# Patient Record
Sex: MALE | Race: WHITE | NOT HISPANIC OR LATINO | Employment: FULL TIME | URBAN - METROPOLITAN AREA
[De-identification: names, ages, dates, MRNs, and addresses within clinical notes are randomized per-mention and may not be internally consistent; named-entity substitution may affect disease eponyms.]

---

## 2017-02-09 RX ORDER — SIMVASTATIN 40 MG/1
40 TABLET, FILM COATED ORAL DAILY
Qty: 30 TABLET | Refills: 11 | Status: ON HOLD | OUTPATIENT
Start: 2017-02-09 | End: 2017-10-11 | Stop reason: HOSPADM

## 2017-09-13 ENCOUNTER — PATIENT MESSAGE (OUTPATIENT)
Dept: INTERNAL MEDICINE | Facility: CLINIC | Age: 47
End: 2017-09-13

## 2017-09-13 ENCOUNTER — HOSPITAL ENCOUNTER (EMERGENCY)
Facility: HOSPITAL | Age: 47
Discharge: LEFT BEFORE DISCHARGE | End: 2017-09-13
Payer: COMMERCIAL

## 2017-09-13 VITALS
RESPIRATION RATE: 18 BRPM | TEMPERATURE: 99 F | SYSTOLIC BLOOD PRESSURE: 155 MMHG | HEIGHT: 69 IN | WEIGHT: 180 LBS | BODY MASS INDEX: 26.66 KG/M2 | HEART RATE: 81 BPM | DIASTOLIC BLOOD PRESSURE: 91 MMHG | OXYGEN SATURATION: 98 %

## 2017-09-13 DIAGNOSIS — R06.02 SHORTNESS OF BREATH: ICD-10-CM

## 2017-09-13 DIAGNOSIS — Z53.21 PATIENT LEFT WITHOUT BEING SEEN: ICD-10-CM

## 2017-09-13 PROCEDURE — 99900041 HC LEFT WITHOUT BEING SEEN- EMERGENCY

## 2017-09-13 PROCEDURE — 99900 PR LEFT WITHOUTBEING SEEN-EMERGENCY: CPT | Mod: ,,, | Performed by: EMERGENCY MEDICINE

## 2017-09-13 PROCEDURE — 93005 ELECTROCARDIOGRAM TRACING: CPT

## 2017-09-13 PROCEDURE — 93010 ELECTROCARDIOGRAM REPORT: CPT | Mod: ,,, | Performed by: INTERNAL MEDICINE

## 2017-09-13 NOTE — PROVIDER PROGRESS NOTES - EMERGENCY DEPT.
Encounter Date: 9/13/2017    ED Physician Progress Notes       SCRIBE NOTE: I, Gris Alston, am scribing for, and in the presence of,  Dr. Thomas.  Physician Statement: I, Dr. Thomas, personally performed the services described in this documentation as scribed by Gris Alston in my presence, and it is both accurate and complete.      EKG - STEMI Decision  Initial Reading: No STEMI present.

## 2017-09-18 ENCOUNTER — OFFICE VISIT (OUTPATIENT)
Dept: INTERNAL MEDICINE | Facility: CLINIC | Age: 47
End: 2017-09-18
Payer: COMMERCIAL

## 2017-09-18 VITALS
DIASTOLIC BLOOD PRESSURE: 70 MMHG | HEART RATE: 81 BPM | HEIGHT: 69 IN | SYSTOLIC BLOOD PRESSURE: 122 MMHG | OXYGEN SATURATION: 98 % | WEIGHT: 186.75 LBS | BODY MASS INDEX: 27.66 KG/M2

## 2017-09-18 DIAGNOSIS — R06.00 DYSPNEA, UNSPECIFIED TYPE: ICD-10-CM

## 2017-09-18 DIAGNOSIS — R42 VERTIGO: ICD-10-CM

## 2017-09-18 DIAGNOSIS — R07.9 CHEST PAIN, UNSPECIFIED TYPE: Primary | ICD-10-CM

## 2017-09-18 DIAGNOSIS — Z00.00 ANNUAL PHYSICAL EXAM: ICD-10-CM

## 2017-09-18 PROCEDURE — 99999 PR PBB SHADOW E&M-EST. PATIENT-LVL III: CPT | Mod: PBBFAC,,, | Performed by: INTERNAL MEDICINE

## 2017-09-18 PROCEDURE — 3008F BODY MASS INDEX DOCD: CPT | Mod: S$GLB,,, | Performed by: INTERNAL MEDICINE

## 2017-09-18 PROCEDURE — 99214 OFFICE O/P EST MOD 30 MIN: CPT | Mod: S$GLB,,, | Performed by: INTERNAL MEDICINE

## 2017-09-18 NOTE — PROGRESS NOTES
Subjective:       Patient ID: Fausto Patterson is a 47 y.o. male.    Chief Complaint: cp, sob  Shortness of Breath   This is a recurrent problem. The current episode started more than 1 month ago. The problem occurs intermittently. The problem has been unchanged. The average episode lasts 2 minutes. Associated symptoms include chest pain, coryza, rhinorrhea and sputum production. Pertinent negatives include no abdominal pain or fever. The symptoms are aggravated by exercise. The patient has no known risk factors for DVT/PE. He has tried nothing for the symptoms. His past medical history is significant for allergies.      Was working out - high intensity P90 X.  Bands, jumping, push ups, etc.    Had chest pain at highest intensity assoc with high HR.   He  Has had similar discomfort in past.  Typically has cp when waling on treadmill for 5-10 min, then better.   m ild calcific atherosclerosis by renal ct 2016.  He has return to ett, but not high intensity exercises.  He is taking zocor.       Awoke with dizziness (vertigo) sob.  Took and ASA.  bp 149/100.      Went to ED.  BP down to 140/99.  He felt better so went home before evaluated by MD.      He was told ekg was nl.  Vertigo has resolved.    Feels foggy mentally.    Home BP fine since.  Reviewed.              Review of Systems   Constitutional: Negative for fatigue, fever and unexpected weight change.   HENT: Positive for rhinorrhea.    Respiratory: Positive for sputum production and shortness of breath.    Cardiovascular: Positive for chest pain.   Gastrointestinal: Positive for nausea (with vertigo). Negative for abdominal pain, constipation and diarrhea.       Objective:      Physical Exam   Constitutional: He is oriented to person, place, and time. He appears well-developed and well-nourished.   Eyes: No scleral icterus.   Neck: No JVD present. No thyromegaly present.   Cardiovascular: Normal rate, regular rhythm and normal heart sounds.    Pulmonary/Chest:  Effort normal and breath sounds normal. No respiratory distress. He has no wheezes. He has no rales.   Abdominal: Soft. He exhibits no mass. There is no tenderness.   Musculoskeletal: He exhibits no edema.   Neurological: He is alert and oriented to person, place, and time.   Psychiatric: He has a normal mood and affect. His behavior is normal.       Assessment:       1. Chest pain, unspecified type    2. Dyspnea, unspecified type    3. Vertigo    4. Annual physical exam        Plan:       Diagnoses and all orders for this visit:    Chest pain, unspecified type  -     Cardiac treadmill stress test; Future    Dyspnea, unspecified type    Vertigo    Annual physical exam  -     CBC auto differential; Future  -     Comprehensive metabolic panel; Future  -     Lipid panel; Future

## 2017-10-02 ENCOUNTER — HOSPITAL ENCOUNTER (OUTPATIENT)
Dept: CARDIOLOGY | Facility: CLINIC | Age: 47
Discharge: HOME | DRG: 234 | End: 2017-10-02
Payer: COMMERCIAL

## 2017-10-02 ENCOUNTER — HOSPITAL ENCOUNTER (INPATIENT)
Facility: HOSPITAL | Age: 47
LOS: 9 days | Discharge: HOME | DRG: 234 | End: 2017-10-11
Attending: HOSPITALIST | Admitting: HOSPITALIST
Payer: COMMERCIAL

## 2017-10-02 DIAGNOSIS — I20.9 ACUTE ANGINA: ICD-10-CM

## 2017-10-02 DIAGNOSIS — R94.39 ABNORMAL STRESS ECG WITH TREADMILL: ICD-10-CM

## 2017-10-02 DIAGNOSIS — Z98.890 HISTORY OF OPEN HEART SURGERY: ICD-10-CM

## 2017-10-02 DIAGNOSIS — E78.5 HLD (HYPERLIPIDEMIA): ICD-10-CM

## 2017-10-02 DIAGNOSIS — E78.2 MIXED HYPERLIPIDEMIA: Primary | ICD-10-CM

## 2017-10-02 DIAGNOSIS — I25.10 CAD (CORONARY ARTERY DISEASE), NATIVE CORONARY ARTERY: ICD-10-CM

## 2017-10-02 DIAGNOSIS — I25.118 CORONARY ARTERY DISEASE OF NATIVE ARTERY OF NATIVE HEART WITH STABLE ANGINA PECTORIS: ICD-10-CM

## 2017-10-02 DIAGNOSIS — R07.9 CHEST PAIN: ICD-10-CM

## 2017-10-02 DIAGNOSIS — I49.9 ARRHYTHMIA: ICD-10-CM

## 2017-10-02 DIAGNOSIS — Z01.818 PRE-OP EVALUATION: ICD-10-CM

## 2017-10-02 DIAGNOSIS — Z95.1 S/P CABG X 3: ICD-10-CM

## 2017-10-02 DIAGNOSIS — R07.9 CHEST PAIN: Primary | ICD-10-CM

## 2017-10-02 DIAGNOSIS — R73.9 STRESS HYPERGLYCEMIA: ICD-10-CM

## 2017-10-02 DIAGNOSIS — I25.10 CORONARY ARTERIOSCLEROSIS: ICD-10-CM

## 2017-10-02 DIAGNOSIS — I21.4 NSTEMI (NON-ST ELEVATED MYOCARDIAL INFARCTION): ICD-10-CM

## 2017-10-02 DIAGNOSIS — K22.2 ESOPHAGEAL OBSTRUCTION: ICD-10-CM

## 2017-10-02 DIAGNOSIS — R07.9 CHEST PAIN, UNSPECIFIED TYPE: ICD-10-CM

## 2017-10-02 DIAGNOSIS — I48.91 A-FIB: ICD-10-CM

## 2017-10-02 LAB
ABO GROUP BLD: NORMAL
BLD GP AB SCN CELLS X3 SERPL QL: NORMAL
DIASTOLIC DYSFUNCTION: NO
DIASTOLIC DYSFUNCTION: NO
ESTIMATED PA SYSTOLIC PRESSURE: 25.66
MITRAL VALVE REGURGITATION: NORMAL
RETIRED EF AND QEF - SEE NOTES: 65 (ref 55–65)
RH BLD: NORMAL
TRICUSPID VALVE REGURGITATION: NORMAL

## 2017-10-02 PROCEDURE — 25000003 PHARM REV CODE 250

## 2017-10-02 PROCEDURE — 99220 PR INITIAL OBSERVATION CARE,LEVL III: CPT | Mod: ,,, | Performed by: HOSPITALIST

## 2017-10-02 PROCEDURE — B2111ZZ FLUOROSCOPY OF MULTIPLE CORONARY ARTERIES USING LOW OSMOLAR CONTRAST: ICD-10-PCS | Performed by: INTERNAL MEDICINE

## 2017-10-02 PROCEDURE — 93306 TTE W/DOPPLER COMPLETE: CPT | Mod: S$GLB,,, | Performed by: INTERNAL MEDICINE

## 2017-10-02 PROCEDURE — C1760 CLOSURE DEV, VASC: HCPCS

## 2017-10-02 PROCEDURE — 25000003 PHARM REV CODE 250: Performed by: INTERNAL MEDICINE

## 2017-10-02 PROCEDURE — 93015 CV STRESS TEST SUPVJ I&R: CPT | Mod: S$GLB,,, | Performed by: INTERNAL MEDICINE

## 2017-10-02 PROCEDURE — 63600175 PHARM REV CODE 636 W HCPCS

## 2017-10-02 PROCEDURE — 86850 RBC ANTIBODY SCREEN: CPT

## 2017-10-02 PROCEDURE — 93458 L HRT ARTERY/VENTRICLE ANGIO: CPT | Mod: 26,,, | Performed by: INTERNAL MEDICINE

## 2017-10-02 PROCEDURE — 99152 MOD SED SAME PHYS/QHP 5/>YRS: CPT | Mod: ,,, | Performed by: INTERNAL MEDICINE

## 2017-10-02 PROCEDURE — G0378 HOSPITAL OBSERVATION PER HR: HCPCS

## 2017-10-02 PROCEDURE — 86900 BLOOD TYPING SEROLOGIC ABO: CPT

## 2017-10-02 PROCEDURE — 86901 BLOOD TYPING SEROLOGIC RH(D): CPT

## 2017-10-02 PROCEDURE — 25000003 PHARM REV CODE 250: Performed by: HOSPITALIST

## 2017-10-02 PROCEDURE — 4A023N7 MEASUREMENT OF CARDIAC SAMPLING AND PRESSURE, LEFT HEART, PERCUTANEOUS APPROACH: ICD-10-PCS | Performed by: INTERNAL MEDICINE

## 2017-10-02 PROCEDURE — 99152 MOD SED SAME PHYS/QHP 5/>YRS: CPT

## 2017-10-02 RX ORDER — SODIUM CHLORIDE 9 MG/ML
3 INJECTION, SOLUTION INTRAVENOUS CONTINUOUS
Status: DISCONTINUED | OUTPATIENT
Start: 2017-10-02 | End: 2017-10-03

## 2017-10-02 RX ORDER — METOPROLOL SUCCINATE 25 MG/1
50 TABLET, EXTENDED RELEASE ORAL DAILY
Status: DISCONTINUED | OUTPATIENT
Start: 2017-10-02 | End: 2017-10-06

## 2017-10-02 RX ORDER — ATORVASTATIN CALCIUM 20 MG/1
40 TABLET, FILM COATED ORAL DAILY
Status: DISCONTINUED | OUTPATIENT
Start: 2017-10-02 | End: 2017-10-06

## 2017-10-02 RX ORDER — ASPIRIN 81 MG/1
81 TABLET ORAL DAILY
Status: DISCONTINUED | OUTPATIENT
Start: 2017-10-03 | End: 2017-10-06

## 2017-10-02 RX ORDER — DIPHENHYDRAMINE HCL 50 MG
50 CAPSULE ORAL EVERY 6 HOURS
Status: DISCONTINUED | OUTPATIENT
Start: 2017-10-02 | End: 2017-10-02 | Stop reason: HOSPADM

## 2017-10-02 RX ADMIN — SODIUM CHLORIDE 3 ML/KG/HR: 0.9 INJECTION, SOLUTION INTRAVENOUS at 04:10

## 2017-10-02 RX ADMIN — SODIUM CHLORIDE 3 ML/KG/HR: 0.9 INJECTION, SOLUTION INTRAVENOUS at 11:10

## 2017-10-02 RX ADMIN — ATORVASTATIN CALCIUM 40 MG: 20 TABLET, FILM COATED ORAL at 05:10

## 2017-10-02 RX ADMIN — METOPROLOL SUCCINATE 50 MG: 25 TABLET, EXTENDED RELEASE ORAL at 06:10

## 2017-10-02 RX ADMIN — DIPHENHYDRAMINE HYDROCHLORIDE 50 MG: 50 CAPSULE ORAL at 11:10

## 2017-10-02 NOTE — CONSULTS
INTERVENTIONAL CARDIOLOGY CONSULT      Referring Physician:  Jessica Alejo MD  Indication: Positive stress test     HPI:  42 y/o male with PMH of HLD, calcific atherosclerosis by renal CT 2016, Schatzki's ring, snoring, calcium oxalate kidney stones s/p lithotripsy who presented for an exercise stress test ECG today for a chief complaint of 10 minute-long exertional chest pain that he had been experiencing for 2 months with exercise (high intensity P90 X; resolved with rest). SOB is associated with CP. Nine minutes into the stress test, the patient reported chest pain 9/10 in intensity. He was able to get to his target heart rate and his peak BP was 137/94. He had STD inferolaterally in the recovery period that persisted for at least 15 minutes. His chest pain resolved 19 minutes after the test had ended. In addition, his STD resolved.His father had an MI at the age of 55 years. He took ASA over a week ago.     ECHO 10/2/17  CONCLUSIONS     1 - Normal left ventricular systolic function (EF 60-65%).     2 - No wall motion abnormalities.     3 - Normal left ventricular diastolic function.     4 - Normal right ventricular systolic function .     5 - Mild mitral regurgitation.     6 - Mild tricuspid regurgitation.     7 - The estimated PA systolic pressure is 26 mmHg.     ROS:  Constitution: Negative for fever, chills, weight loss or gain.   HENT: Negative for sore throat, rhinorrhea, or headache.  Eyes: Negative for blurred or double vision.   Cardiovascular: See above  Pulmonary: Positive for SOB   Gastrointestinal: Negative for abdominal pain, nausea, vomiting, or diarrhea.   : Negative for dysuria.   Neurological: Negative for focal weakness or sensory changes.    Past Medical History:   Diagnosis Date    Hyperlipidemia     Kidney stone     Schatzki's ring     Unspecified disorder of kidney and ureter     calcium oxalate nephrolithiasis     History reviewed. No pertinent surgical history.  Family History  "  Problem Relation Age of Onset    No Known Problems Mother     Heart disease Father      cad    Hypertension Sister     No Known Problems Daughter     No Known Problems Daughter      Social History   Substance Use Topics    Smoking status: Never Smoker    Smokeless tobacco: Never Used    Alcohol use No     Review of patient's allergies indicates:   Allergen Reactions    Clarithromycin Other (See Comments)     hiccups    Levaquin [levofloxacin] Hives    Naldecon-ex Hives      diphenhydrAMINE  50 mg Oral Q6H     No current facility-administered medications on file prior to encounter.      Current Outpatient Prescriptions on File Prior to Encounter   Medication Sig Dispense Refill    simvastatin (ZOCOR) 40 MG tablet Take 1 tablet (40 mg total) by mouth once daily. 30 tablet 11       Continuous Infusions:   sodium chloride 0.9% 3 mL/kg/hr (10/02/17 1143)       Scheduled Meds:   diphenhydrAMINE  50 mg Oral Q6H     PRN Meds:  OBJECTIVE:     Vitals:    10/02/17 1115 10/02/17 1137   BP: (!) 148/90 138/88   Pulse: 79    Resp: 16    Temp: 97.8 °F (36.6 °C)    Weight: 84.7 kg (186 lb 11.7 oz)    Height: 5' 9" (1.753 m)      Gen: Lying in bed, no acute distress  HEENT: Supple, no JVD  Cvs: Regular rate and rhythm, no murmurs  Resp: Normal work of breathing, clear bilaterally  Abd: Soft, non-tender and not distended  Ext: Warm, no edema  Vascular:   LEFT RIGHT   RADIAL 2+ 2+   BRACHIAL 2+ 2+   FEMORAL 2+ 2+   DP 2+ 2+   TP 2+ 2+   Uri's Test Normal Normal     Labs:       Recent Labs  Lab 10/02/17  0719      K 4.5      CO2 29   BUN 13   CREATININE 1.2      ANIONGAP 7*     No results for input(s): MG, PHOS in the last 168 hours.    Invalid input(s): CA    Recent Labs  Lab 10/02/17  0719   AST 17   ALT 26   ALKPHOS 69   BILITOT 1.4*   ALBUMIN 4.2        Recent Labs  Lab 10/02/17  0719   WBC 5.57   HGB 16.2   HCT 46.3      GRAN 53.6  3.0     No results for input(s): INR in the last 168 " hours.  No results for input(s): TROPONINI, BNP in the last 168 hours.   Micro:   Blood Cultures  No results found for: LABBLOO  Urine Cultures  No results found for: LABURIN  IMAGING:   EKGs:    TTE:  EF   Date Value Ref Range Status   10/02/2017 65 55 - 65        Prior Ischemic Evaluation:    IMPRESSION:      44 y/o male with PMH of HLD, calcific atherosclerosis by renal CT 2016, Schatzki's ring, snoring, calcium oxalate kidney stones s/p lithotripsy who presented for an exercise stress test ECG today for a chief complaint of 10 minute-long exertional chest pain that he had been experiencing for 2 months with exercise (high intensity P90 X; resolved with rest). The patient had CP and inferolateral STD during his stress test. He is currently CP-free.     PROCEDURAL RISK STRATIFICATION:   BENITA Score:  1  ISAC Score:  73  [place] Predicted PCI Mortality Risk:  0.2  [place] Predicted MACE Risk:  1.4  [place] Bleeding Score & Risk:  19 - very low  Contrast Nephropathy Post-PCI/Renal Risk Score: 0    Prior Contrast Allergy:  No  Sedation   Prior Sedation: Yes   History of Obstructive Sleep Apnea/SDB:  Yes  Pertinent Allergies:   Latex: No    ASA: No   Heparin-Induced Thrombocytopenia: No  PLAN:     Stable angina  - positive stress today today  - right radial access  - ALEX candidate  - [facility] today    -The risks, benefits & alternatives of the procedure were explained to the patient.    -The risks of coronary angiography include but are not limited to:  Bleeding, infection, heart rhythm abnormalities, allergic reactions, kidney injury, stroke and death.    -Should stenting be indicated, the patient has agreed to dual anti-platelet therapy for 1-consecutive year with a drug-eluting stent and a minimum of 1-month with the use of a bare metal stent.    -The risks of moderate sedation include hypotension, respiratory depression, arrhythmias, bronchospasm, & death.    -Informed consent was obtained & the patient is agreeable to  proceed with the procedure.  -This patient was discussed with the attending interventional cardiologist who agrees with the above assessment & plan.

## 2017-10-02 NOTE — PROGRESS NOTES
Pt here for treadmill stress today. Pt had st depression in multiple leads and also 9/10 chest pain as he stepped off treadmill. Dr spaulding and dr meraz assessed ekg's. Dr spaulding added a ccfd which was also done. Received vorb for pt to be direct admit to hospital for left heart cath today. Dr meraz explained this to pt and wife ; they verbalize understanding 20 g sl started in left a/c . Dr garcia cath fellow also spoke with pt and wife. Both verbalizes understanding. Pt brought to [facility] via wheelchair. Wife at pt side; to be prepped  for [facility]. Report given to jarad wei In [facility]sc[facility]. Pt alert and oriented, vss , nad.

## 2017-10-02 NOTE — PROGRESS NOTES
"    Post Cath Note  Referring Physician: Jessica Alejo MD  Procedure: [id number] (PTCA) (Right)       Access: Right radial    3V CAD    See full report for further details    Intervention:   None  Closure device: Radial band    Post Cath Exam:   /88 (BP Location: Right arm, Patient Position: Lying)   Pulse 79   Temp 97.8 °F (36.6 °C)   Resp 16   Ht 5' 9" (1.753 m)   Wt 84.7 kg (186 lb 11.7 oz)   BMI 27.58 kg/m²   No unusual pain, hematoma, thrill or bruit at vascular access site.  Distal pulse present without signs of ischemia.    Recommendations:   - Routine post-cath care  - IVF at 250 cc/hr for 4 hrs  - CTS consult   - GDMT for CAD   - discussed with interventional fellow: no P2Y12i at this time    "

## 2017-10-02 NOTE — PROGRESS NOTES
Patient is a 44 yo male with a past medical hx of HLD who presented for an exercise stress test today for a chief complaint of intermittent chest pain that he was experiencing for roughly 1-2 months with strenuous exertion. 9 minutes into the stress test, the patient reported chest pain (the same type of pain that has been occurring) 9/10 in terms of intensity. He was able to get to his target heart rate and his peak BP was 137/94. The patient after the test was terminated had ST segment depression inferolaterally that persisted for at least 15 minutes. His chest pain was fully gone after 18:30 minutes after the test was ended. In addition his ST depressions had gone away at that time.       Patient reported to me that normally he gets cp with exertion (when he does his P90x exercises) and it gets alleviated by rest. He reports that he has a strong family hx of CAD and MI as his father had a MI at the age of 55. In addition his mother has carotid artery disease. His past medical hx is only significant for HLD and denies DM and HTN. He has a previous CT renal stone study that shows mild atherosclerosis in his aorta.    He has normal Cr function and is not anemic. Last LDL was 89.8. Patient ate last night. Last time he took aspirin was over a week ago.    Plan:  - Admit to hospital medicine with interventional cardiology consult for [facility] +/- PCI (if indicated)  - Performing 2D echo  - Will discuss with admitting staff    Discussed with Spencer Flores MD, PGY-4  Cardiology fellow

## 2017-10-02 NOTE — NURSING TRANSFER
Nursing Transfer Note      10/2/2017     Transfer from cath lab to 355    Transfer via stretcher    Transfer with telemetry, NS gtt    Transported by cath lab RN    Medicines sent: n/a    Chart send with patient: yes    Notified: family at bedside. Dr. Alejo notified    Patient reassessed at: 10/2/17 1648    Upon arrival to floor: VS assessed. Call bell in reach. Right radial site assessed. Bed in lowest position. Will continue to monitor.

## 2017-10-02 NOTE — H&P
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 Callahan, for 1-2 months of intermittent bilateral chest pain with moderate to strenuous exercise while doing an intense workout regimen, the P90X program. The chest pain was always alleviated by rest. He had a prior episode of chest pain with exertion and vertigo for which he went to the ED, had an EKG, and was d/c-ed home. Today, on the treadmill test, he reached goal HR and BP and started having chest pain after 9 minutes, with ST depressions in inferolateral leads for 15-18 mins, and chest pain resolved after 18 minutes of rest. No nitro given. 2D Echo with CFD showed a normal heart with LVEF 65, normal diastolic function, mildly elevated PA pressure of 26 mmHg.      Echo fellow, Dr Spencer Buitrago, called Interventional Cardiology for cath and [place] [facility] [place].     He is a never smoker, does not drink EtOH, no illicits.   Fam Hx: F -  of Mi at 55, M - PAD/carotid stents, GF  of MI at age 85    He went emergently to cath lab, found to have multivessel CAD so no PCI done await CTS consult. No complaints on admit. Never had chest pain at rest, only with exertion. No palpitations, syncope, f/c, or other sx.     No results found for: HGBA1C      Review of Systems:  Constitutional: no fever or chills  Eyes: no visual changes  ENT: no nasal congestion or sore throat  Respiratory: no cough or shortness of breath  Cardiovascular: no chest pain or " palpitations  Gastrointestinal: no nausea or vomiting, no abdominal pain or change in bowel habits  Genitourinary: no hematuria or dysuria  Integument/Breast: no rash or pruritis  Hematologic/Lymphatic: no easy bruising or lymphadenopathy  Allergy/Immunology: none  Musculoskeletal: no arthralgias or myalgias  Neurological: no seizures or tremors  Behavioral/Psych: no auditory or visual hallucinations  Endocrine: no heat or cold intolerance    HISTORY:     Past Medical History:   Diagnosis Date    Hyperlipidemia     Kidney stone     Schatzki's ring     Unspecified disorder of kidney and ureter     calcium oxalate nephrolithiasis       History reviewed. No pertinent surgical history.    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    Treadmill and elliptical 6 days a week.           MEDICATIONS & ALLERGIES:     Current Facility-Administered Medications   Medication    diphenhydrAMINE capsule 50 mg       Review of patient's allergies indicates:   Allergen Reactions    Clarithromycin Other (See Comments)     hiccups    Levaquin [levofloxacin] Hives    Naldecon-ex Hives       OBJECTIVE:     Vital Signs:  Temp:  [97.8 °F (36.6 °C)] 97.8 °F (36.6 °C)  Pulse:  [79] 79  Resp:  [16] 16  BP: (138-148)/(88-90) 138/88    Physical Exam:  General: well developed, well nourished, no distress  Eyes: conjunctivae/corneas clear. PERRL..  HENT:  Head:normocephalic, atraumatic. Ears:bilateral TM's and external ear canals normal. Nose: Nares normal. Septum midline. Mucosa normal. No drainage or sinus tenderness., no discharge. Throat: lips, mucosa, and tongue normal; teeth and gums normal and no throat erythema.  Neck: supple, symmetrical, trachea midline, no JVD and thyroid not enlarged, symmetric, no tenderness/mass/nodules  Lungs:  clear to auscultation bilaterally and normal respiratory effort  Cardiovascular: Heart: regular rate and rhythm, S1, S2 normal, no murmur, click, rub or gallop. Chest Wall: no tenderness. Extremities: no cyanosis or edema, or clubbing. Pulses: 2+ and symmetric.  Abdomen/Rectal: Abdomen: soft, non-tender non-distented; bowel sounds normal; no masses,  no organomegaly. Rectal: not examined  Skin: Skin color, texture, turgor normal. No rashes or lesions  Musculoskeletal:normal gait and no clubbing, cyanosis  Lymph Nodes: No cervical or supraclavicular adenopathy  Neurologic: Normal strength and tone. No focal numbness or weakness  Psych/Behavioral:  Alert and oriented, appropriate affect., Speech: appropriate quality, quantity and organization of sentences and Thought content: normal    Laboratory  Recent Results (from the past 24 hour(s))   CBC auto differential    Collection Time: 10/02/17  7:19 AM   Result Value Ref Range    WBC 5.57 3.90 - 12.70 K/uL    RBC 5.41 4.60 - 6.20 M/uL    Hemoglobin 16.2 14.0 - 18.0 g/dL    Hematocrit 46.3 40.0 - 54.0 %    MCV 86 82 - 98 fL    MCH 29.9 27.0 - 31.0 pg    MCHC 35.0 32.0 - 36.0 g/dL    RDW 12.8 11.5 - 14.5 %    Platelets 207 150 - 350 K/uL    MPV 9.0 (L) 9.2 - 12.9 fL    Gran # 3.0 1.8 - 7.7 K/uL    Lymph # 1.7 1.0 - 4.8 K/uL    Mono # 0.6 0.3 - 1.0 K/uL    Eos # 0.2 0.0 - 0.5 K/uL    Baso # 0.02 0.00 - 0.20 K/uL    Gran% 53.6 38.0 - 73.0 %    Lymph% 30.2 18.0 - 48.0 %    Mono% 11.5 4.0 - 15.0 %    Eosinophil% 4.1 0.0 - 8.0 %    Basophil% 0.4 0.0 - 1.9 %    Differential Method Automated     Comprehensive metabolic panel    Collection Time: 10/02/17  7:19 AM   Result Value Ref Range    Sodium 141 136 - 145 mmol/L    Potassium 4.5 3.5 - 5.1 mmol/L    Chloride 105 95 - 110 mmol/L    CO2 29 23 - 29 mmol/L    Glucose 110 70 - 110 mg/dL    BUN, Bld 13 6 - 20 mg/dL    Creatinine 1.2 0.5 - 1.4 mg/dL    Calcium 9.8 8.7 - 10.5 mg/dL    Total Protein 7.6 6.0 - 8.4 g/dL    Albumin 4.2 3.5 - 5.2 g/dL    Total Bilirubin 1.4 (H) 0.1 - 1.0 mg/dL    Alkaline Phosphatase 69 55 - 135 U/L    AST 17 10 - 40 U/L    ALT 26 10 - 44 U/L    Anion Gap 7 (L) 8 - 16 mmol/L    eGFR if African American >60.0 >60 mL/min/1.73 m^2    eGFR if non African American >60.0 >60 mL/min/1.73 m^2   Lipid panel    Collection Time: 10/02/17  7:19 AM   Result Value Ref Range    Cholesterol 160 120 - 199 mg/dL    Triglycerides 141 30 - 150 mg/dL    HDL 42 40 - 75 mg/dL    LDL Cholesterol 89.8 63.0 - 159.0 mg/dL    HDL/Chol Ratio 26.3 20.0 - 50.0 %    Total Cholesterol/HDL Ratio 3.8 2.0 - 5.0    Non-HDL Cholesterol 118 mg/dL   Cardiac treadmill stress test    Collection Time: 10/02/17  8:00 AM   Result Value Ref Range    Diastolic Dysfunction No    2D Echo w/ Color Flow Doppler    Collection Time: 10/02/17  9:04 AM   Result Value Ref Range    EF 65 55 - 65    Mitral Valve Regurgitation MILD     Diastolic Dysfunction No     Est. PA Systolic Pressure 25.66     Tricuspid Valve Regurgitation MILD    Type & Screen    Collection Time: 10/02/17 11:36 AM   Result Value Ref Range    Indirect Pieter NEG    Group & Rh    Collection Time: 10/02/17 11:36 AM   Result Value Ref Range    ABO A     Rh Type NEG        Radiologic Results:  none    Cardiology results:  10/2 Stress Echo  PRE-TEST DATA   EKG: Resting electrocardiogram reveals normal sinus rhythm at a rate of 68 bpm.     TEST DESCRIPTION   The patient exercised for 9.0 minutes on a High Ramp protocol, corresponding to a functional capacity of 15 estimated METS, achieving a peak heart rate of 164 bpm,  which is 95% of the age predicted maximum heart rate. The patient reported 9/10 chest pain   at peak exercise. All symptoms resolved by 7 minutes of recovery. The patient discontinued exercise secondary to chest pain.     At peak exercise, EKG revealed 2mm of ST segment depression in the inferolateral leads.     EKG Conclusions:    1. The EKG portion of this study is positive for ischemia at a moderate workload, and peak heart rate of 164 bpm (95% of predicted).   2. Exercise capacity is above average.   3. Blood pressure response to exercise was normal (Presenting BP: 132/83 Peak BP: 137/94).   4. No significant arrhythmias were present.   5. The patient reported significant chest pain during the protocol which resolved in recovery.   6. The Duke treadmill score was -9 suggesting an intermediate probability for future cardiovascular events.    Left heart cath  Diagnostic:          Patient has a right dominant coronary artery.        - Left Main Coronary Artery:             The distal LM has a 70% stenosis. There is BENITA 3 flow.     - Left Anterior Descending Artery:             The mid LAD has a 80% stenosis. There is BENITA 3 flow.     - Left Circumflex Artery:             The LCX has luminal irregularities. There is BENITA 3 flow.     - Right Coronary Artery:             The proximal RCA has a 50% stenosis. There is BENITA 3 flow.     - Radial:             The radial was not studied.     - Ramus:             The proximal ramus has a 75% stenosis. There is BENITA 3 flow.     - OM2:             The proximal OM2 has a 50% stenosis. There is BENITA 3 flow.    Prior Cardiology Results:  9/13/2017 EKG: NSR 80, no ischemic changes      ASSESSMENT & PLAN:     CAD, multivessel on cath today. Cath report as above. No PCI done as he is felt to be a probable candidate for CABG.  Positive stress test  Angina, stable  NO ACS at this time  - CT surgery consult for CABG eval  - ASA 81, atorva 40 (instead of home simva), start BB Toprol XL  50 (titrate upwards or downwards if warranted) for antianginal effects    Prophylaxis- low risk; ambulate    Advance directives- full code    Post-acute care- after CTS consult, home with f/u with PCP, cardiology, and probably CTS    Jessica Alejo MD  Hospital Medicine Staff

## 2017-10-02 NOTE — NURSING
Received via wheelchair.  Aa0x3.  Denies pain or SOB.  Resp even and unlabored.  Verbalized understanding of procedure.  Phoned report to JOHAN Yee

## 2017-10-03 DIAGNOSIS — I25.10 CORONARY ARTERY DISEASE INVOLVING NATIVE CORONARY ARTERY OF NATIVE HEART WITHOUT ANGINA PECTORIS: Primary | ICD-10-CM

## 2017-10-03 PROCEDURE — 25000003 PHARM REV CODE 250: Performed by: NURSE PRACTITIONER

## 2017-10-03 PROCEDURE — 20600001 HC STEP DOWN PRIVATE ROOM

## 2017-10-03 PROCEDURE — 99232 SBSQ HOSP IP/OBS MODERATE 35: CPT | Mod: ,,, | Performed by: HOSPITALIST

## 2017-10-03 PROCEDURE — 25000003 PHARM REV CODE 250: Performed by: HOSPITALIST

## 2017-10-03 PROCEDURE — 93880 EXTRACRANIAL BILAT STUDY: CPT | Performed by: SURGERY

## 2017-10-03 PROCEDURE — 99223 1ST HOSP IP/OBS HIGH 75: CPT | Mod: ,,, | Performed by: NURSE PRACTITIONER

## 2017-10-03 RX ORDER — LISINOPRIL 5 MG/1
5 TABLET ORAL DAILY
Status: DISCONTINUED | OUTPATIENT
Start: 2017-10-03 | End: 2017-10-03

## 2017-10-03 RX ORDER — AMLODIPINE BESYLATE 5 MG/1
5 TABLET ORAL DAILY
Status: DISCONTINUED | OUTPATIENT
Start: 2017-10-03 | End: 2017-10-06

## 2017-10-03 RX ADMIN — ATORVASTATIN CALCIUM 40 MG: 20 TABLET, FILM COATED ORAL at 08:10

## 2017-10-03 RX ADMIN — AMLODIPINE BESYLATE 5 MG: 5 TABLET ORAL at 01:10

## 2017-10-03 RX ADMIN — METOPROLOL SUCCINATE 50 MG: 25 TABLET, EXTENDED RELEASE ORAL at 08:10

## 2017-10-03 RX ADMIN — ASPIRIN 81 MG: 81 TABLET, COATED ORAL at 08:10

## 2017-10-03 NOTE — PROGRESS NOTES
"Progress Note  Hospital Medicine    Provider team: [facility]  Admit Date: 10/2/2017  Encounter Date: 10/03/2017     SUBJECTIVE:     Follow-up Visit for: Positive cardiac stress test    HPI (See H&P for complete P,F,SHx): Mr Fausto Patterson is a 47 y.o. 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 Callahan, for 1-2 months of intermittent bilateral chest pain with moderate to strenuous exercise while doing an intense workout regimen, the P90X program. The chest pain was always alleviated by rest. He had a prior episode of chest pain with exertion and vertigo for which he went to the ED, had an EKG, and was d/c-ed home. Today, on the treadmill test, he reached goal HR and BP and started having chest pain after 9 minutes, with ST depressions in inferolateral leads for 15-18 mins, and chest pain resolved after 18 minutes of rest. No nitro given. 2D Echo with CFD showed a normal heart with LVEF 65, normal diastolic function, mildly elevated PA pressure of 26 mmHg.       Echo fellow, Dr Spencer Buitrago, called Interventional Cardiology for cath and called [facility] [place].      He is a never smoker, does not drink EtOH, no illicits.   Fam Hx: F -  of Mi at 54, M - PAD/carotid stents, GF  of MI at age 85     He went emergently to cath lab, found to have multivessel CAD so no PCI done await CTS consult. No complaints on admit. Never had chest pain at rest, only with exertion. No palpitations, syncope, f/c, or other sx.    Left heart cath  Diagnostic:          Patient has a right dominant coronary artery.        - Left Main Coronary Artery:             The distal LM has a 70% stenosis. There is BENITA 3 " flow.     - Left Anterior Descending Artery:             The mid LAD has a 80% stenosis. There is BENITA 3 flow.     - Left Circumflex Artery:             The LCX has luminal irregularities. There is BENITA 3 flow.     - Right Coronary Artery:             The proximal RCA has a 50% stenosis. There is BENITA 3 flow.     - Radial:             The radial was not studied.     - Ramus:             The proximal ramus has a 75% stenosis. There is BENITA 3 flow.     - OM2:             The proximal OM2 has a 50% stenosis. There is BENITA 3 flow.    Interval history: Patient doing well. He appears nervous/anxious about the prospect of surgery and patient/family had numerous pending questions for surgeon.    Review of Systems:  Review of Systems   Constitutional: Negative for chills and fever.   HENT: Negative for congestion and sore throat.    Eyes: Negative for photophobia, pain and discharge.   Respiratory: Negative for cough, hemoptysis, sputum production and shortness of breath.    Cardiovascular: Positive for chest pain. Negative for palpitations and leg swelling.   Gastrointestinal: Negative for abdominal pain, diarrhea, nausea and vomiting.   Genitourinary: Negative for dysuria and urgency.   Musculoskeletal: Negative for myalgias and neck pain.   Skin: Negative for itching and rash.   Neurological: Negative for sensory change, focal weakness and headaches.   Endo/Heme/Allergies: Negative for polydipsia. Does not bruise/bleed easily.   Psychiatric/Behavioral: Negative for depression and suicidal ideas. The patient is nervous/anxious.        OBJECTIVE:     Intake/Output Summary (Last 24 hours) at 10/03/17 0805  Last data filed at 10/03/17 0500   Gross per 24 hour   Intake              960 ml   Output                0 ml   Net              960 ml     Vital Signs Range (Last 24H):  Temp:  [97.6 °F (36.4 °C)-98.2 °F (36.8 °C)]   Pulse:  [56-80]   Resp:  [16-18]   BP: (118-148)/(68-90)   SpO2:  [94 %-96 %]   Body mass index is 27.01  "kg/m².    Objective:  General Appearance:  Comfortable, well-appearing, in no acute distress and not in pain.    Vital signs: (most recent): Blood pressure (!) 168/93, pulse 78, temperature 98.1 °F (36.7 °C), resp. rate 18, height 5' 9" (1.753 m), weight 83 kg (182 lb 14 oz), SpO2 96 %.  No fever.    Output: Producing urine and producing stool.    HEENT: Normal HEENT exam.    Lungs:  Normal effort.  Breath sounds clear to auscultation.  He is not in respiratory distress.  No rales or wheezes.    Heart: Normal rate.  Regular rhythm.  S1 normal and S2 normal.  No murmur.   Chest: Symmetric chest wall expansion.   Abdomen: Abdomen is soft.  Bowel sounds are normal.   There is no abdominal tenderness.     Extremities: Normal range of motion.  There is no deformity, effusion, dependent edema or local swelling.    Pulses: Distal pulses are intact.    Neurological: Patient is alert and oriented to person, place and time.  Normal strength.    Pupils:  Pupils are equal, round, and reactive to light.    Skin:  Warm and dry.          Medications:  Medication list was reviewed in EPIC and changes noted under Assessment/Plan and MAR.    Laboratory:  Recent Labs      10/02/17   0719   WBC  5.57   RBC  5.41   HGB  16.2   HCT  46.3   PLT  207   MCV  86   MCH  29.9   MCHC  35.0   GRAN  53.6  3.0   LYMPH  30.2  1.7   MONO  11.5  0.6   EOS  0.2      Recent Labs      10/02/17   0719   GLU  110   NA  141   K  4.5   CL  105   CO2  29   BUN  13   CREATININE  1.2   CALCIUM  9.8   ANIONGAP  7*       ASSESSMENT/PLAN:     Active Hospital Problems    Diagnosis  POA    *Positive cardiac stress test [R94.39]  Yes    CAD (coronary artery disease), native coronary artery [I25.10]  Yes    Acute angina [I20.9]  Yes    Mixed hyperlipidemia [E78.2]  Yes      Resolved Hospital Problems    Diagnosis Date Resolved POA   No resolved problems to display.        CAD, multivessel, native heart, native vessel with stable angina  Positive stress " test  Essential hypertension  Mixed Hyperlipidemia  - CT surgery consult for CABG eval  - ASA 81, atorva 40 (instead of home simva), start BB Toprol XL 50 (titrate upwards or downwards if warranted) for antianginal effects  - Consider addition of CCB if still hypertensive    Please note plavix not being given in anticipation of CTS. Will consider addition ACEI/ARB as well if continues to be hypertensive.     Prophylaxis- low risk; ambulate     Advance directives- full code     Post-acute care - pending clinical course, likely home without services    Anticipated discharge date and disposition:   Patient likely for CTS procedure on 10/6/2017. Will transfer to CTS service post-procedure.  Trell Gloria MD  [facility] Medicine

## 2017-10-03 NOTE — HOSPITAL COURSE
Patient was found to have multivessel disease.  CT surgery is asked to assess surgical candidacy for revascularization.    STS score:  1.2%    NYHA class II symptoms.

## 2017-10-03 NOTE — HPI
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 Callahan, for 1-2 months of intermittent bilateral chest pain with moderate to strenuous exercise while doing an intense workout regimen, the P90X program. The chest pain was always alleviated by rest. He had a prior episode of chest pain with exertion and vertigo for which he went to the ED, had an EKG, and was d/c-ed home. Today, on the treadmill test, he reached goal HR and BP and started having chest pain after 9 minutes, with ST depressions in inferolateral leads for 15-18 mins, and chest pain resolved after 18 minutes of rest. No nitro given. 2D Echo with CFD showed a normal heart with LVEF 65, normal diastolic function, mildly elevated PA pressure of 26 mmHg.       Echo fellow, Dr Spencer Buitrago, called Interventional Cardiology for cath and called [facility] [place] for admission.      He is a never smoker, does not drink EtOH, no illicits.   Fam Hx: F -  of Mi at 54, M - PAD/carotid stents, GF  of MI at age 85     He went emergently to cath lab, found to have multivessel CAD so no PCI done await CTS consult. No complaints on admit. Never had chest pain at rest, only with exertion. No palpitations, syncope, f/c, or other sx.   "

## 2017-10-03 NOTE — PLAN OF CARE
Problem: Patient Care Overview  Goal: Plan of Care Review  Outcome: Revised  Pt free of falls/traumas/injuries. Denies c/o SOB, CP, or discomfort. Generalized skin remains CDI; no edema noted.  Norvasc added to manage BP.  Orders placed for bilateral carotid US and PFTs as part of CABG w/u.  Tentative plan for CABG Friday.  Pt tolerating plan of care.

## 2017-10-03 NOTE — CONSULTS
[facility]  Cardiothoracic Surgery  Progress Note    Patient Name: Fausto Patterson  MRN: [id number]  Admission Date: 10/2/2017  Hospital Length of Stay: 1 days  Code Status: Full Code   Attending Physician: Trell Gloria MD   Referring Provider: Chelsea Callahan MD  Principal Problem:CAD (coronary artery disease), native coronary artery    Subjective:     Post-Op Info:  Procedure(s) (LRB):  ZORUKYIIQDA-FDLUSDKYEUYM-TRLIHQJITEED-CORONARY (PTCA) (Right)   1 Day Post-Op         Medications:  Continuous Infusions:   Scheduled Meds:   aspirin  81 mg Oral Daily    atorvastatin  40 mg Oral Daily    metoprolol succinate  50 mg Oral Daily     PRN Meds:     Objective:     Vital Signs (Most Recent):  Temp: 98.5 °F (36.9 °C) (10/03/17 0850)  Pulse: 90 (10/03/17 1000)  Resp: 18 (10/03/17 0850)  BP: (!) 139/90 (10/03/17 0851)  SpO2: 97 % (10/03/17 0850) Vital Signs (24h Range):  Temp:  [97.6 °F (36.4 °C)-98.5 °F (36.9 °C)] 98.5 °F (36.9 °C)  Pulse:  [56-90] 90  Resp:  [16-18] 18  SpO2:  [94 %-97 %] 97 %  BP: (118-153)/(68-92) 139/90     Weight: 83 kg (182 lb 14 oz)  Body mass index is 27.01 kg/m².    SpO2: 97 %  O2 Device (Oxygen Therapy): room air     ROS:    Review of Systems   Constitutional: Negative for fever and malaise/fatigue.   HENT: Negative for congestion and sore throat.    Eyes: Negative for blurred vision, double vision and discharge.   Respiratory: Negative for cough, shortness of breath and wheezing.    Cardiovascular: see HPI  Gastrointestinal: Negative for abdominal pain, constipation, diarrhea, nausea and vomiting.   Genitourinary: Negative for dysuria, frequency and urgency.   Musculoskeletal: Negative for back pain and myalgias.   Skin: Negative for itching and rash.   Neurological: Negative for dizziness, speech change, seizures, weakness and headaches.   Endo/Heme/Allergies: Does not bruise/bleed easily.   Psychiatric/Behavioral: Negative for depression. The patient is not  nervous/anxious.        Intake/Output - Last 3 Shifts       10/01 0700 - 10/02 0659 10/02 0700 - 10/03 0659 10/03 0700 - 10/04 0659    P.O.  960 820    Total Intake(mL/kg)  960 (11.3) 820 (9.9)    Net   +960 +820           Urine Occurrence  2 x     Stool Occurrence  0 x 1 x          Lines/Drains/Airways     Peripheral Intravenous Line                 Peripheral IV - Single Lumen Left Antecubital -- days         Peripheral IV - Single Lumen 10/02/17 1208 Left Hand less than 1 day                Physical Exam   Constitutional: He is oriented to person, place, and time. He appears well-developed and well-nourished.   HENT:   Head: Normocephalic and atraumatic.   Eyes: EOM are normal. Pupils are equal, round, and reactive to light.   Neck: Normal range of motion. Neck supple.   Cardiovascular: Normal rate, regular rhythm and normal heart sounds.    Pulmonary/Chest: Effort normal and breath sounds normal.   Abdominal: Soft. Bowel sounds are normal.   Musculoskeletal: Normal range of motion.   Neurological: He is alert and oriented to person, place, and time.   Skin: Skin is warm and dry.   Psychiatric: He has a normal mood and affect. His behavior is normal. Thought content normal.   Nursing note and vitals reviewed.      Significant Labs:  BMP:   Recent Labs  Lab 10/02/17  0719         K 4.5      CO2 29   BUN 13   CREATININE 1.2   CALCIUM 9.8     CBC:   Recent Labs  Lab 10/02/17  0719   WBC 5.57   RBC 5.41   HGB 16.2   HCT 46.3      MCV 86   MCH 29.9   MCHC 35.0     CMP:   Recent Labs  Lab 10/02/17  0719      CALCIUM 9.8   ALBUMIN 4.2   PROT 7.6      K 4.5   CO2 29      BUN 13   CREATININE 1.2   ALKPHOS 69   ALT 26   AST 17   BILITOT 1.4*       Significant Diagnostics:    2D echo:  1 - Normal left ventricular systolic function (EF 60-65%).     2 - No wall motion abnormalities.     3 - Normal left ventricular diastolic function.     4 - Normal right ventricular systolic  function .     5 - Mild mitral regurgitation.     6 - Mild tricuspid regurgitation.     7 - The estimated PA systolic pressure is 26 mmHg.       LHC:    - Left Main Coronary Artery:             The distal LM has a 70% stenosis. There is BENITA 3 flow.     - Left Anterior Descending Artery:             The mid LAD has a 80% stenosis. There is BENITA 3 flow.     - Left Circumflex Artery:             The LCX has luminal irregularities. There is BENITA 3 flow.     - Right Coronary Artery:             The proximal RCA has a 50% stenosis. There is BENITA 3 flow.     - Radial:             The radial was not studied.     - Ramus:             The proximal ramus has a 75% stenosis. There is BENITA 3 flow.     - OM2:             The proximal OM2 has a 50% stenosis. There is BENITA 3 flow.        Assessment/Plan:     * CAD (coronary artery disease), native coronary artery    Patient admitted after an abnormal stress test and found to have 3 vessel CAD.  CT surgery asked to assess for possible surgical revascularization.  He was staffed by Dr. Mejia.  Long conversation with the patient and his family regarding the procedure, lifestyle modifications, and risks vs benefits.  He is currently on the schedule for Friday for CABG x 3.  Will order pre op testing to include PFT's and carotid U/S.            Sofia Cruz NP  Cardiothoracic Surgery  [facility]

## 2017-10-03 NOTE — PLAN OF CARE
Problem: Patient Care Overview  Goal: Plan of Care Review  Outcome: Revised  Plan of care discussed with patient. vasband removed, site c/d/i. Patient is free of fall/trauma/injury. Denies CP, SOB, or pain/discomfort. All questions addressed. Will continue to monitor

## 2017-10-03 NOTE — PROGRESS NOTES
BP continues to be elevated. Pt denies any c/o H/A, CP, or discomfort. Dr. Gloria notified; stated he will order additional BP meds.  Awaiting further orders.        10/03/17 1136 10/03/17 1145   Vital Signs   Temp 98.1 °F (36.7 °C) --    Temp src Oral --    Pulse 78 --    Resp 18 --    SpO2 96 % --    BP (!) 168/93 (!) 142/94   MAP (mmHg) 123 --    BP Location Right arm Right arm   BP Method Automatic Manual   Patient Position Sitting Sitting

## 2017-10-03 NOTE — SUBJECTIVE & OBJECTIVE
Medications:  Continuous Infusions:   Scheduled Meds:   aspirin  81 mg Oral Daily    atorvastatin  40 mg Oral Daily    metoprolol succinate  50 mg Oral Daily     PRN Meds:     Objective:     Vital Signs (Most Recent):  Temp: 98.5 °F (36.9 °C) (10/03/17 0850)  Pulse: 90 (10/03/17 1000)  Resp: 18 (10/03/17 0850)  BP: (!) 139/90 (10/03/17 0851)  SpO2: 97 % (10/03/17 0850) Vital Signs (24h Range):  Temp:  [97.6 °F (36.4 °C)-98.5 °F (36.9 °C)] 98.5 °F (36.9 °C)  Pulse:  [56-90] 90  Resp:  [16-18] 18  SpO2:  [94 %-97 %] 97 %  BP: (118-153)/(68-92) 139/90     Weight: 83 kg (182 lb 14 oz)  Body mass index is 27.01 kg/m².    SpO2: 97 %  O2 Device (Oxygen Therapy): room air     ROS:    Review of Systems   Constitutional: Negative for fever and malaise/fatigue.   HENT: Negative for congestion and sore throat.    Eyes: Negative for blurred vision, double vision and discharge.   Respiratory: Negative for cough, shortness of breath and wheezing.    Cardiovascular: see HPI  Gastrointestinal: Negative for abdominal pain, constipation, diarrhea, nausea and vomiting.   Genitourinary: Negative for dysuria, frequency and urgency.   Musculoskeletal: Negative for back pain and myalgias.   Skin: Negative for itching and rash.   Neurological: Negative for dizziness, speech change, seizures, weakness and headaches.   Endo/Heme/Allergies: Does not bruise/bleed easily.   Psychiatric/Behavioral: Negative for depression. The patient is not nervous/anxious.        Intake/Output - Last 3 Shifts       10/01 0700 - 10/02 0659 10/02 0700 - 10/03 0659 10/03 0700 - 10/04 0659    P.O.  960 820    Total Intake(mL/kg)  960 (11.3) 820 (9.9)    Net   +960 +820           Urine Occurrence  2 x     Stool Occurrence  0 x 1 x          Lines/Drains/Airways     Peripheral Intravenous Line                 Peripheral IV - Single Lumen Left Antecubital -- days         Peripheral IV - Single Lumen 10/02/17 1208 Left Hand less than 1 day                Physical  Exam   Constitutional: He is oriented to person, place, and time. He appears well-developed and well-nourished.   HENT:   Head: Normocephalic and atraumatic.   Eyes: EOM are normal. Pupils are equal, round, and reactive to light.   Neck: Normal range of motion. Neck supple.   Cardiovascular: Normal rate, regular rhythm and normal heart sounds.    Pulmonary/Chest: Effort normal and breath sounds normal.   Abdominal: Soft. Bowel sounds are normal.   Musculoskeletal: Normal range of motion.   Neurological: He is alert and oriented to person, place, and time.   Skin: Skin is warm and dry.   Psychiatric: He has a normal mood and affect. His behavior is normal. Thought content normal.   Nursing note and vitals reviewed.      Significant Labs:  BMP:   Recent Labs  Lab 10/02/17  0719         K 4.5      CO2 29   BUN 13   CREATININE 1.2   CALCIUM 9.8     CBC:   Recent Labs  Lab 10/02/17  0719   WBC 5.57   RBC 5.41   HGB 16.2   HCT 46.3      MCV 86   MCH 29.9   MCHC 35.0     CMP:   Recent Labs  Lab 10/02/17  0719      CALCIUM 9.8   ALBUMIN 4.2   PROT 7.6      K 4.5   CO2 29      BUN 13   CREATININE 1.2   ALKPHOS 69   ALT 26   AST 17   BILITOT 1.4*       Significant Diagnostics:    2D echo:  1 - Normal left ventricular systolic function (EF 60-65%).     2 - No wall motion abnormalities.     3 - Normal left ventricular diastolic function.     4 - Normal right ventricular systolic function .     5 - Mild mitral regurgitation.     6 - Mild tricuspid regurgitation.     7 - The estimated PA systolic pressure is 26 mmHg.       LHC:    - Left Main Coronary Artery:             The distal LM has a 70% stenosis. There is BENITA 3 flow.     - Left Anterior Descending Artery:             The mid LAD has a 80% stenosis. There is BENITA 3 flow.     - Left Circumflex Artery:             The LCX has luminal irregularities. There is BENITA 3 flow.     - Right Coronary Artery:             The proximal RCA  has a 50% stenosis. There is BENITA 3 flow.     - Radial:             The radial was not studied.     - Ramus:             The proximal ramus has a 75% stenosis. There is BENITA 3 flow.     - OM2:             The proximal OM2 has a 50% stenosis. There is BENITA 3 flow.

## 2017-10-03 NOTE — ASSESSMENT & PLAN NOTE
Patient admitted after an abnormal stress test and found to have 3 vessel CAD.  CT surgery asked to assess for possible surgical revascularization.  He was staffed by Dr. Mejia.  Long conversation with the patient and his family regarding the procedure, lifestyle modifications, and risks vs benefits.  He is currently on the schedule for Friday for CABG x 3.  Will order pre op testing to include PFT's and carotid U/S.

## 2017-10-03 NOTE — PLAN OF CARE
10/03/17 1023   Discharge Assessment   Assessment Type Discharge Planning Assessment   Confirmed/corrected address and phone number on facesheet? Yes   Assessment information obtained from? Patient;Medical Record   Expected Length of Stay (days) 2   Communicated expected length of stay with patient/caregiver yes   Prior to hospitilization cognitive status: Alert/Oriented   Prior to hospitalization functional status: Independent   Current cognitive status: Alert/Oriented   Current Functional Status: Independent   Lives With spouse;child(michael), dependent   Able to Return to Prior Arrangements yes   Is patient able to care for self after discharge? Yes   Patient's perception of discharge disposition home or selfcare   Readmission Within The Last 30 Days no previous admission in last 30 days   Patient currently being followed by outpatient case management? No   Patient currently receives any other outside agency services? No   Equipment Currently Used at Home none   Do you have any problems affording any of your prescribed medications? No   Is the patient taking medications as prescribed? yes   Does the patient have transportation home? Yes   Transportation Available family or friend will provide   Does the patient receive services at the [facility] Clinic? No   Discharge Plan A Home with family   Patient/Family In Agreement With Plan yes   Admitted with CP and + Stress Test. Lives with wife and children and is independent in his ADLs. Plan is to DC home. No DC needs identified.

## 2017-10-04 LAB
ALBUMIN SERPL BCP-MCNC: 4.5 G/DL
ALP SERPL-CCNC: 75 U/L
ALT SERPL W/O P-5'-P-CCNC: 29 U/L
ANION GAP SERPL CALC-SCNC: 8 MMOL/L
AST SERPL-CCNC: 21 U/L
BASOPHILS # BLD AUTO: 0.02 K/UL
BASOPHILS NFR BLD: 0.3 %
BILIRUB SERPL-MCNC: 1.5 MG/DL
BUN SERPL-MCNC: 9 MG/DL
CALCIUM SERPL-MCNC: 10.2 MG/DL
CHLORIDE SERPL-SCNC: 102 MMOL/L
CO2 SERPL-SCNC: 27 MMOL/L
CREAT SERPL-MCNC: 1.3 MG/DL
DIFFERENTIAL METHOD: ABNORMAL
EOSINOPHIL # BLD AUTO: 0.2 K/UL
EOSINOPHIL NFR BLD: 3.3 %
ERYTHROCYTE [DISTWIDTH] IN BLOOD BY AUTOMATED COUNT: 13 %
EST. GFR  (AFRICAN AMERICAN): >60 ML/MIN/1.73 M^2
EST. GFR  (NON AFRICAN AMERICAN): >60 ML/MIN/1.73 M^2
GLUCOSE SERPL-MCNC: 107 MG/DL
HCT VFR BLD AUTO: 49.7 %
HGB BLD-MCNC: 17.3 G/DL
INR PPP: 1.1
LYMPHOCYTES # BLD AUTO: 1.4 K/UL
LYMPHOCYTES NFR BLD: 20.5 %
MCH RBC QN AUTO: 30.5 PG
MCHC RBC AUTO-ENTMCNC: 34.8 G/DL
MCV RBC AUTO: 88 FL
MONOCYTES # BLD AUTO: 0.8 K/UL
MONOCYTES NFR BLD: 11.4 %
NEUTROPHILS # BLD AUTO: 4.5 K/UL
NEUTROPHILS NFR BLD: 64.5 %
PLATELET # BLD AUTO: 210 K/UL
PMV BLD AUTO: 8.5 FL
POTASSIUM SERPL-SCNC: 4.2 MMOL/L
PROT SERPL-MCNC: 8.2 G/DL
PROTHROMBIN TIME: 11.6 SEC
RBC # BLD AUTO: 5.68 M/UL
SODIUM SERPL-SCNC: 137 MMOL/L
WBC # BLD AUTO: 7.01 K/UL

## 2017-10-04 PROCEDURE — 85610 PROTHROMBIN TIME: CPT

## 2017-10-04 PROCEDURE — 25000003 PHARM REV CODE 250: Performed by: HOSPITALIST

## 2017-10-04 PROCEDURE — 99231 SBSQ HOSP IP/OBS SF/LOW 25: CPT | Mod: ,,, | Performed by: HOSPITALIST

## 2017-10-04 PROCEDURE — 25000003 PHARM REV CODE 250: Performed by: NURSE PRACTITIONER

## 2017-10-04 PROCEDURE — 36415 COLL VENOUS BLD VENIPUNCTURE: CPT

## 2017-10-04 PROCEDURE — 85025 COMPLETE CBC W/AUTO DIFF WBC: CPT

## 2017-10-04 PROCEDURE — 80053 COMPREHEN METABOLIC PANEL: CPT

## 2017-10-04 PROCEDURE — 20600001 HC STEP DOWN PRIVATE ROOM

## 2017-10-04 RX ADMIN — ASPIRIN 81 MG: 81 TABLET, COATED ORAL at 08:10

## 2017-10-04 RX ADMIN — ATORVASTATIN CALCIUM 40 MG: 20 TABLET, FILM COATED ORAL at 08:10

## 2017-10-04 RX ADMIN — AMLODIPINE BESYLATE 5 MG: 5 TABLET ORAL at 08:10

## 2017-10-04 RX ADMIN — METOPROLOL SUCCINATE 50 MG: 25 TABLET, EXTENDED RELEASE ORAL at 08:10

## 2017-10-04 NOTE — PLAN OF CARE
Problem: Patient Care Overview  Goal: Plan of Care Review  Outcome: Revised  Pt free of falls/traumas/injuries. Denies c/o SOB, CP, or discomfort. Generalized skin remains CDI; no edema noted.  Plan for PFTs as part of CABG w/u.  Tentative plan for CABG Friday.  Wife at the bedside.  Pt and family tolerating plan of care.

## 2017-10-04 NOTE — PROGRESS NOTES
Progress Note  [facility] Medicine    Provider team: [facility] MED C  Admit Date: 10/2/2017  Encounter Date: 10/04/2017     SUBJECTIVE:     Follow-up Visit for: CAD (coronary artery disease), native coronary artery    HPI (See H&P for complete P,F,SHx): 47M HTN, HLD who went for [facility] after positive outpatient stress test. He was found to have multivessel CAD and thought to be good candidate for CABG.  Patient awaits procedure on 10/6.    Left heart cath  Diagnostic:          Patient has a right dominant coronary artery.        - Left Main Coronary Artery:             The distal LM has a 70% stenosis. There is BENITA 3 flow.     - Left Anterior Descending Artery:             The mid LAD has a 80% stenosis. There is BENITA 3 flow.     - Left Circumflex Artery:             The LCX has luminal irregularities. There is BENITA 3 flow.     - Right Coronary Artery:             The proximal RCA has a 50% stenosis. There is BENITA 3 flow.     - Radial:             The radial was not studied.     - Ramus:             The proximal ramus has a 75% stenosis. There is BENITA 3 flow.     - OM2:             The proximal OM2 has a 50% stenosis. There is BENITA 3 flow.    Interval history: Patient doing well. He appears nervous/anxious about the prospect of surgery and patient/family had numerous pending questions for surgeon.    Review of Systems:  Review of Systems   Constitutional: Negative for chills and fever.   HENT: Negative for congestion and sore throat.    Eyes: Negative for photophobia, pain and discharge.   Respiratory: Negative for cough, hemoptysis, sputum production and shortness of breath.    Cardiovascular: Negative for chest pain, palpitations and leg swelling.   Gastrointestinal: Negative for abdominal pain, diarrhea, nausea and vomiting.   Genitourinary: Negative for dysuria and urgency.   Musculoskeletal: Negative for myalgias and neck pain.   Skin: Negative for itching and rash.   Neurological: Negative for sensory change, focal  "weakness and headaches.   Endo/Heme/Allergies: Negative for polydipsia. Does not bruise/bleed easily.   Psychiatric/Behavioral: Negative for depression and suicidal ideas. The patient is nervous/anxious.        OBJECTIVE:       Intake/Output Summary (Last 24 hours) at 10/04/17 0757  Last data filed at 10/04/17 0507   Gross per 24 hour   Intake             1610 ml   Output             1275 ml   Net              335 ml     Vital Signs Range (Last 24H):  Temp:  [97.5 °F (36.4 °C)-99.7 °F (37.6 °C)]   Pulse:  []   Resp:  [18]   BP: (124-168)/(72-94)   SpO2:  [93 %-97 %]   Body mass index is 27.01 kg/m².    Objective:  General Appearance:  Comfortable, well-appearing, in no acute distress and not in pain.    Vital signs: (most recent): Blood pressure 124/72, pulse 101, temperature 98 °F (36.7 °C), temperature source Oral, resp. rate 18, height 5' 9" (1.753 m), weight 83 kg (182 lb 14 oz), SpO2 (!) 93 %.  No fever.    Output: Producing urine and producing stool.    HEENT: Normal HEENT exam.    Lungs:  Normal effort.  Breath sounds clear to auscultation.  He is not in respiratory distress.  No rales or wheezes.    Heart: Normal rate.  Regular rhythm.  S1 normal and S2 normal.  No murmur.   Chest: Symmetric chest wall expansion.   Abdomen: Abdomen is soft.  Bowel sounds are normal.   There is no abdominal tenderness.     Extremities: Normal range of motion.  There is no deformity, effusion, dependent edema or local swelling.    Pulses: Distal pulses are intact.    Neurological: Patient is alert and oriented to person, place and time.  Normal strength.    Pupils:  Pupils are equal, round, and reactive to light.    Skin:  Warm and dry.          Medications:  Medication list was reviewed in EPIC and changes noted under Assessment/Plan and MAR.    Laboratory:  Recent Labs      10/02/17   0719   WBC  5.57   RBC  5.41   HGB  16.2   HCT  46.3   PLT  207   MCV  86   MCH  29.9   MCHC  35.0   GRAN  53.6  3.0   LYMPH  30.2  " 1.7   MONO  11.5  0.6   EOS  0.2      Recent Labs      10/02/17   0719   GLU  110   NA  141   K  4.5   CL  105   CO2  29   BUN  13   CREATININE  1.2   CALCIUM  9.8   ANIONGAP  7*       ASSESSMENT/PLAN:     Active Hospital Problems    Diagnosis  POA    *CAD (coronary artery disease), native coronary artery [I25.10]  Yes    Pre-op evaluation [Z01.818]  Not Applicable    Positive cardiac stress test [R94.39]  Yes    Acute angina [I20.9]  Yes    Mixed hyperlipidemia [E78.2]  Yes      Resolved Hospital Problems    Diagnosis Date Resolved POA   No resolved problems to display.        CAD, multivessel, native heart, native vessel with stable angina  Positive stress test  Essential hypertension  Mixed Hyperlipidemia  - CT surgery planning CABG  - ASA 81, atorva 40 (instead of home simva), start BB Toprol XL 50 (titrate upwards or downwards if warranted) for antianginal effects  - CCB added    Please note plavix not being given in anticipation of CTS. Will consider addition ACEI/ARB as well if continues to be hypertensive.     Prophylaxis- low risk; ambulate     Advance directives- full code     Post-acute care - pending clinical course, likely home without services    Anticipated discharge date and disposition:   Patient likely for CTS procedure on 10/6/2017. Will transfer to CTS service post-procedure.  Trell Gloria MD  [facility] Medicine

## 2017-10-04 NOTE — PLAN OF CARE
Problem: Patient Care Overview  Goal: Plan of Care Review  Outcome: Ongoing (interventions implemented as appropriate)  Pt. Remains free from falls/ injury/trauma. No complaints of CP, SOB, or discomfort. Plan of Care reviewed with patient. Bilateral carotid US completed. Orders for PFT for CABG w/u. VSS and NADN. Will continue to monitor.

## 2017-10-05 ENCOUNTER — ANESTHESIA EVENT (OUTPATIENT)
Dept: SURGERY | Facility: HOSPITAL | Age: 47
DRG: 234 | End: 2017-10-05
Payer: COMMERCIAL

## 2017-10-05 LAB
ABO + RH BLD: NORMAL
ALBUMIN SERPL BCP-MCNC: 4 G/DL
ALP SERPL-CCNC: 68 U/L
ALT SERPL W/O P-5'-P-CCNC: 27 U/L
ANION GAP SERPL CALC-SCNC: 9 MMOL/L
AST SERPL-CCNC: 18 U/L
BASOPHILS # BLD AUTO: 0.02 K/UL
BASOPHILS NFR BLD: 0.3 %
BILIRUB SERPL-MCNC: 1.3 MG/DL
BLD GP AB SCN CELLS X3 SERPL QL: NORMAL
BUN SERPL-MCNC: 12 MG/DL
CALCIUM SERPL-MCNC: 9.8 MG/DL
CHLORIDE SERPL-SCNC: 104 MMOL/L
CO2 SERPL-SCNC: 26 MMOL/L
CREAT SERPL-MCNC: 1.3 MG/DL
DIFFERENTIAL METHOD: ABNORMAL
EOSINOPHIL # BLD AUTO: 0.4 K/UL
EOSINOPHIL NFR BLD: 6 %
ERYTHROCYTE [DISTWIDTH] IN BLOOD BY AUTOMATED COUNT: 12.8 %
EST. GFR  (AFRICAN AMERICAN): >60 ML/MIN/1.73 M^2
EST. GFR  (NON AFRICAN AMERICAN): >60 ML/MIN/1.73 M^2
GLUCOSE SERPL-MCNC: 98 MG/DL
HCT VFR BLD AUTO: 48.4 %
HGB BLD-MCNC: 16.9 G/DL
LYMPHOCYTES # BLD AUTO: 1.8 K/UL
LYMPHOCYTES NFR BLD: 26.6 %
MCH RBC QN AUTO: 30.5 PG
MCHC RBC AUTO-ENTMCNC: 34.9 G/DL
MCV RBC AUTO: 87 FL
MONOCYTES # BLD AUTO: 1 K/UL
MONOCYTES NFR BLD: 13.8 %
NEUTROPHILS # BLD AUTO: 3.7 K/UL
NEUTROPHILS NFR BLD: 53.2 %
PLATELET # BLD AUTO: 210 K/UL
PMV BLD AUTO: 8.8 FL
POTASSIUM SERPL-SCNC: 4.5 MMOL/L
PROT SERPL-MCNC: 7.5 G/DL
RBC # BLD AUTO: 5.54 M/UL
SODIUM SERPL-SCNC: 139 MMOL/L
WBC # BLD AUTO: 6.87 K/UL

## 2017-10-05 PROCEDURE — 25000003 PHARM REV CODE 250: Performed by: HOSPITALIST

## 2017-10-05 PROCEDURE — 86900 BLOOD TYPING SEROLOGIC ABO: CPT

## 2017-10-05 PROCEDURE — 80053 COMPREHEN METABOLIC PANEL: CPT

## 2017-10-05 PROCEDURE — 99231 SBSQ HOSP IP/OBS SF/LOW 25: CPT | Mod: ,,, | Performed by: HOSPITALIST

## 2017-10-05 PROCEDURE — 85025 COMPLETE CBC W/AUTO DIFF WBC: CPT

## 2017-10-05 PROCEDURE — 20600001 HC STEP DOWN PRIVATE ROOM

## 2017-10-05 PROCEDURE — 86850 RBC ANTIBODY SCREEN: CPT

## 2017-10-05 PROCEDURE — 36415 COLL VENOUS BLD VENIPUNCTURE: CPT

## 2017-10-05 PROCEDURE — 25000003 PHARM REV CODE 250: Performed by: NURSE PRACTITIONER

## 2017-10-05 RX ADMIN — ATORVASTATIN CALCIUM 40 MG: 20 TABLET, FILM COATED ORAL at 09:10

## 2017-10-05 RX ADMIN — ASPIRIN 81 MG: 81 TABLET, COATED ORAL at 09:10

## 2017-10-05 RX ADMIN — METOPROLOL SUCCINATE 50 MG: 25 TABLET, EXTENDED RELEASE ORAL at 09:10

## 2017-10-05 RX ADMIN — AMLODIPINE BESYLATE 5 MG: 5 TABLET ORAL at 09:10

## 2017-10-05 NOTE — PLAN OF CARE
Problem: Patient Care Overview  Goal: Individualization & Mutuality  Outcome: Ongoing (interventions implemented as appropriate)  Pt. Remains free from falls/ injury/trauma. No complaints of CP, SOB, or discomfort. Plan of Care reviewed with patient. VSS and NADN. Will continue to monitor.

## 2017-10-05 NOTE — PROGRESS NOTES
Notified Dr. Gloria and CTS  that PFT was not done per pt and family. PFT shows completed in computer. No orders received.

## 2017-10-05 NOTE — PROGRESS NOTES
Instructed pt and family on use of I.S.-3800. To radiology via stretcher for xray as ordered. Family at bedside. Telemetry monitor remains on

## 2017-10-05 NOTE — ANESTHESIA PREPROCEDURE EVALUATION
10/05/2017  [facility]  Anesthesia Pre-Operative Evaluation         Patient Name: Fausto Patterson  YOB: 1970  MRN: [id number]    SUBJECTIVE:     Pre-operative evaluation for Procedure(s) (LRB):  AORTOCORONARY BYPASS-CABG/CABGX3 (N/A)     10/05/2017    Fausto Patterson is a 47 y.o. male w/ a significant PMHx of HLD, HTN, schatzki ring s/p dilation, acute angina during treadmill stress test with subsequent left heart cath identifying significant CAD who presents for the above procedure.    LDA:   Left hand 18g PIV  Left antecubital 20g PIV    Prev airway: None documented.    Patient Active Problem List   Diagnosis    Schatzki's ring    Calcium oxalate kidney stones    Snores    CMC arthritis, thumb, degenerative    Ureteral stone    Mixed hyperlipidemia    Positive cardiac stress test    CAD (coronary artery disease), native coronary artery    Acute angina    Pre-op evaluation       Review of patient's allergies indicates:   Allergen Reactions    Clarithromycin Other (See Comments)     hiccups    Levaquin [levofloxacin] Hives    Naldecon-ex Hives       Current Inpatient Medications:   amlodipine  5 mg Oral Daily    aspirin  81 mg Oral Daily    atorvastatin  40 mg Oral Daily    metoprolol succinate  50 mg Oral Daily       No current facility-administered medications on file prior to encounter.      Current Outpatient Prescriptions on File Prior to Encounter   Medication Sig Dispense Refill    simvastatin (ZOCOR) 40 MG tablet Take 1 tablet (40 mg total) by mouth once daily. 30 tablet 11       History reviewed. No pertinent surgical history.    Social History     Social History    Marital status:      Spouse name: N/A    Number of children: 2    Years of education: N/A     Occupational History    Not on file.     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    Treadmill and elliptical 6 days a week.           OBJECTIVE:     Vital Signs Range (Last 24H):  Temp:  [36.2 °C (97.1 °F)-36.6 °C (97.9 °F)]   Pulse:  [63-92]   Resp:  [17-18]   BP: (114-135)/(72-84)   SpO2:  [94 %-99 %]       CBC:   Recent Labs      10/04/17   0831  10/05/17   0537   WBC  7.01  6.87   RBC  5.68  5.54   HGB  17.3  16.9   HCT  49.7  48.4   PLT  210  210   MCV  88  87   MCH  30.5  30.5   MCHC  34.8  34.9       CMP:   Recent Labs      10/04/17   0831  10/05/17   0537   NA  137  139   K  4.2  4.5   CL  102  104   CO2  27  26   BUN  9  12   CREATININE  1.3  1.3   GLU  107  98   CALCIUM  10.2  9.8   ALBUMIN  4.5  4.0   PROT  8.2  7.5   ALKPHOS  75  68   ALT  29  27   AST  21  18   BILITOT  1.5*  1.3*       INR:  Recent Labs      10/04/17   0831   INR  1.1       Diagnostic Studies:  EKG:   Normal sinus rhythm  Normal ECG  When compared with ECG of 07-OCT-2010 09:34,  No significant change was found  Confirmed by Sulaiman COOLEY, Gonzalo ([id number]) on 9/14/2017 8:15:51 AM    2D ECHO:  Results for orders placed or performed during the hospital encounter of 10/02/17   2D Echo w/ Color Flow Doppler   Result Value Ref Range    EF 65 55 - 65    Mitral Valve Regurgitation MILD     Diastolic Dysfunction No     Est. PA Systolic Pressure 25.66     Tricuspid Valve Regurgitation MILD     1 - Normal left ventricular systolic function (EF 60-65%).     2 - No wall motion abnormalities.     3 - Normal left ventricular diastolic function.     4 - Normal right ventricular systolic function .     5 - Mild mitral regurgitation.     6 - Mild tricuspid regurgitation.     7 - The estimated PA systolic pressure is 26 mmHg  SIGNED BY: Kelli Novak MD On: 10/02/2017 09:59    Treadmill Stress  Test:  EKG Conclusions:    1. The EKG portion of this study is positive for ischemia at a moderate workload, and peak heart rate of 164 bpm (95% of predicted).   2. Exercise capacity is above average.   3. Blood pressure response to exercise was normal (Presenting BP: 132/83 Peak BP: 137/94).   4. No significant arrhythmias were present.   5. The patient reported significant chest pain during the protocol which resolved in recovery.   6. The Duke treadmill score was -9 suggesting an intermediate probability for future cardiovascular events.  SIGNED BY: Kelli Novak MD On: 10/02/2017 09:18    Left Heart Cath 10/2/17:  Diagnostic:      Patient has a right dominant coronary artery.        - Left Main Coronary Artery:             The distal LM has a 70% stenosis. There is BENITA 3 flow.     - Left Anterior Descending Artery:             The mid LAD has a 80% stenosis. There is BENITA 3 flow.     - Left Circumflex Artery:             The LCX has luminal irregularities. There is BENITA 3 flow.     - Right Coronary Artery:             The proximal RCA has a 50% stenosis. There is BENITA 3 flow.     - Radial:             The radial was not studied.     - Ramus:             The proximal ramus has a 75% stenosis. There is BENITA 3 flow.     - OM2:             The proximal OM2 has a 50% stenosis. There is BENITA 3 flow.    ASSESSMENT/PLAN:       Anesthesia Evaluation    I have reviewed the Patient Summary Reports.     I have reviewed the Medications.     Review of Systems  Anesthesia Hx:  Denies Hx of Anesthetic complications  History of prior surgery of interest to airway management or planning: Previous anesthesia: MAC Denies Family Hx of Anesthesia complications.   Denies Personal Hx of Anesthesia complications.   Social:  Non-Smoker    Hematology/Oncology:  Hematology Normal   Oncology Normal     EENT/Dental:EENT/Dental Normal   Cardiovascular:   CAD   Angina, with exertion ECG has been reviewed.    Pulmonary:  Pulmonary Normal     Renal/:   renal calculi    Hepatic/GI:  Hepatic/GI Normal    Musculoskeletal:   Arthritis     Neurological:  Neurology Normal    Endocrine:  Endocrine Normal    Dermatological:  Skin Normal    Psych:  Psychiatric Normal           Physical Exam  General:  Well nourished    Airway/Jaw/Neck:  Airway Findings: Mouth Opening: Normal Tongue: Normal  General Airway Assessment: Adult  Mallampati: I  TM Distance: Normal, at least 6 cm  Jaw/Neck Findings:  Neck ROM: Normal ROM      Dental:  Dental Findings: In tact   Chest/Lungs:  Chest/Lungs Findings: Clear to auscultation, Normal Respiratory Rate     Heart/Vascular:  Heart Findings: Rate: Normal  Rhythm: Regular Rhythm  Sounds: Normal  Heart murmur: negative       Mental Status:  Mental Status Findings:  Cooperative, Alert and Oriented         Anesthesia Plan  Type of Anesthesia, risks & benefits discussed:  Anesthesia Type:  general  Patient's Preference:   Intra-op Monitoring Plan: standard ASA monitors, arterial line and central line  Intra-op Monitoring Plan Comments:   Post Op Pain Control Plan: multimodal analgesia, IV/PO Opioids PRN and per primary service following discharge from PACU  Post Op Pain Control Plan Comments:   Induction:   IV  Beta Blocker:  Patient is on a Beta-Blocker and has received one dose within the past 24 hours (No further documentation required).       Informed Consent: Patient understands risks and agrees with Anesthesia plan.  Questions answered. Anesthesia consent signed with patient.  ASA Score: 4     Day of Surgery Review of History & Physical: I have interviewed and examined the patient. I have reviewed the patient's H&P dated:  There are no significant changes.  H&P update referred to the surgeon.         Ready For Surgery From Anesthesia Perspective.

## 2017-10-05 NOTE — PROGRESS NOTES
Progress Note  [facility] Medicine    Provider team: [facility] MED C  Admit Date: 10/2/2017  Encounter Date: 10/05/2017     SUBJECTIVE:     Follow-up Visit for: CAD (coronary artery disease), native coronary artery    HPI (See H&P for complete P,F,SHx): 47M HTN, HLD who went for [facility] after positive outpatient stress test. He was found to have multivessel CAD and thought to be good candidate for CABG.  Patient awaits procedure on 10/6.    Left heart cath  Diagnostic:          Patient has a right dominant coronary artery.        - Left Main Coronary Artery:             The distal LM has a 70% stenosis. There is BENITA 3 flow.     - Left Anterior Descending Artery:             The mid LAD has a 80% stenosis. There is BENITA 3 flow.     - Left Circumflex Artery:             The LCX has luminal irregularities. There is BENITA 3 flow.     - Right Coronary Artery:             The proximal RCA has a 50% stenosis. There is BENITA 3 flow.     - Radial:             The radial was not studied.     - Ramus:             The proximal ramus has a 75% stenosis. There is BENITA 3 flow.     - OM2:             The proximal OM2 has a 50% stenosis. There is BENITA 3 flow.    Interval history: Patient doing well. All questions answered to patient satisfaction.    Review of Systems:  Review of Systems   Constitutional: Negative for chills and fever.   HENT: Negative for congestion and sore throat.    Eyes: Negative for photophobia, pain and discharge.   Respiratory: Negative for cough, hemoptysis, sputum production and shortness of breath.    Cardiovascular: Negative for chest pain, palpitations and leg swelling.   Gastrointestinal: Negative for abdominal pain, diarrhea, nausea and vomiting.   Genitourinary: Negative for dysuria and urgency.   Musculoskeletal: Negative for myalgias and neck pain.   Skin: Negative for itching and rash.   Neurological: Negative for sensory change, focal weakness and headaches.   Endo/Heme/Allergies: Negative for polydipsia.  "Does not bruise/bleed easily.   Psychiatric/Behavioral: Negative for depression and suicidal ideas. The patient is nervous/anxious.        OBJECTIVE:       Intake/Output Summary (Last 24 hours) at 10/05/17 0800  Last data filed at 10/05/17 0400   Gross per 24 hour   Intake             1250 ml   Output             2025 ml   Net             -775 ml     Vital Signs Range (Last 24H):  Temp:  [97.1 °F (36.2 °C)-98.8 °F (37.1 °C)]   Pulse:  [63-92]   Resp:  [17-18]   BP: (114-140)/(72-89)   SpO2:  [94 %-99 %]   Body mass index is 27.01 kg/m².    Objective:  General Appearance:  Comfortable, well-appearing, in no acute distress and not in pain.    Vital signs: (most recent): Blood pressure 135/84, pulse 83, temperature 97.7 °F (36.5 °C), temperature source Oral, resp. rate 17, height 5' 9" (1.753 m), weight 83 kg (182 lb 14 oz), SpO2 99 %.  No fever.    Output: Producing urine and producing stool.    HEENT: Normal HEENT exam.    Lungs:  Normal effort.  Breath sounds clear to auscultation.  He is not in respiratory distress.  No rales or wheezes.    Heart: Normal rate.  Regular rhythm.  S1 normal and S2 normal.  No murmur.   Chest: Symmetric chest wall expansion.   Abdomen: Abdomen is soft.  Bowel sounds are normal.   There is no abdominal tenderness.     Extremities: Normal range of motion.  There is no deformity, effusion, dependent edema or local swelling.    Pulses: Distal pulses are intact.    Neurological: Patient is alert and oriented to person, place and time.  Normal strength.    Pupils:  Pupils are equal, round, and reactive to light.    Skin:  Warm and dry.          Medications:  Medication list was reviewed in EPIC and changes noted under Assessment/Plan and MAR.    Laboratory:  Recent Labs      10/05/17   0537   WBC  6.87   RBC  5.54   HGB  16.9   HCT  48.4   PLT  210   MCV  87   MCH  30.5   MCHC  34.9   GRAN  53.2  3.7   LYMPH  26.6  1.8   MONO  13.8  1.0   EOS  0.4      Recent Labs      10/05/17   0537 "   GLU  98   NA  139   K  4.5   CL  104   CO2  26   BUN  12   CREATININE  1.3   CALCIUM  9.8   ANIONGAP  9       ASSESSMENT/PLAN:     Active Hospital Problems    Diagnosis  POA    *CAD (coronary artery disease), native coronary artery [I25.10]  Yes    Pre-op evaluation [Z01.818]  Not Applicable    Positive cardiac stress test [R94.39]  Yes    Acute angina [I20.9]  Yes    Mixed hyperlipidemia [E78.2]  Yes      Resolved Hospital Problems    Diagnosis Date Resolved POA   No resolved problems to display.        CAD, multivessel, native heart, native vessel with stable angina  Positive stress test  Essential hypertension  Mixed Hyperlipidemia  - CT surgery planning CABG  - ASA 81, atorva 40 (instead of home simva), start BB Toprol XL 50 (titrate upwards or downwards if warranted) for antianginal effects  - CCB added    Please note plavix not being given in anticipation of CTS. Will consider addition ACEI/ARB as well if continues to be hypertensive.     Prophylaxis- low risk; ambulate     Advance directives- full code     Post-acute care - pending clinical course, likely home without services    Anticipated discharge date and disposition:   Patient likely for CTS procedure on 10/6/2017. Will transfer to CTS service post-procedure.  Trell Gloria MD  [facility] Medicine

## 2017-10-06 ENCOUNTER — ANESTHESIA (OUTPATIENT)
Dept: SURGERY | Facility: HOSPITAL | Age: 47
DRG: 234 | End: 2017-10-06
Payer: COMMERCIAL

## 2017-10-06 PROBLEM — I25.10 CORONARY ARTERIOSCLEROSIS: Status: ACTIVE | Noted: 2017-10-06

## 2017-10-06 PROBLEM — Z95.1 S/P CABG X 3: Status: ACTIVE | Noted: 2017-10-06

## 2017-10-06 PROBLEM — R73.9 STRESS HYPERGLYCEMIA: Status: ACTIVE | Noted: 2017-10-06

## 2017-10-06 LAB
ALBUMIN SERPL BCP-MCNC: 4 G/DL
ALLENS TEST: ABNORMAL
ALP SERPL-CCNC: 68 U/L
ALT SERPL W/O P-5'-P-CCNC: 27 U/L
ANION GAP SERPL CALC-SCNC: 12 MMOL/L
ANION GAP SERPL CALC-SCNC: 9 MMOL/L
APTT BLDCRRT: 34.3 SEC
AST SERPL-CCNC: 18 U/L
BASOPHILS # BLD AUTO: 0.01 K/UL
BASOPHILS # BLD AUTO: 0.02 K/UL
BASOPHILS NFR BLD: 0.1 %
BASOPHILS NFR BLD: 0.3 %
BILIRUB SERPL-MCNC: 1.6 MG/DL
BUN SERPL-MCNC: 13 MG/DL
BUN SERPL-MCNC: 14 MG/DL
CALCIUM SERPL-MCNC: 7.6 MG/DL
CALCIUM SERPL-MCNC: 9.8 MG/DL
CHLORIDE SERPL-SCNC: 103 MMOL/L
CHLORIDE SERPL-SCNC: 108 MMOL/L
CO2 SERPL-SCNC: 24 MMOL/L
CO2 SERPL-SCNC: 25 MMOL/L
CREAT SERPL-MCNC: 1.2 MG/DL
CREAT SERPL-MCNC: 1.3 MG/DL
DIFFERENTIAL METHOD: ABNORMAL
DIFFERENTIAL METHOD: ABNORMAL
EOSINOPHIL # BLD AUTO: 0 K/UL
EOSINOPHIL # BLD AUTO: 0.3 K/UL
EOSINOPHIL NFR BLD: 0.1 %
EOSINOPHIL NFR BLD: 4.4 %
ERYTHROCYTE [DISTWIDTH] IN BLOOD BY AUTOMATED COUNT: 12.8 %
ERYTHROCYTE [DISTWIDTH] IN BLOOD BY AUTOMATED COUNT: 12.8 %
EST. GFR  (AFRICAN AMERICAN): >60 ML/MIN/1.73 M^2
EST. GFR  (AFRICAN AMERICAN): >60 ML/MIN/1.73 M^2
EST. GFR  (NON AFRICAN AMERICAN): >60 ML/MIN/1.73 M^2
EST. GFR  (NON AFRICAN AMERICAN): >60 ML/MIN/1.73 M^2
ESTIMATED AVG GLUCOSE: 105 MG/DL
GLUCOSE SERPL-MCNC: 107 MG/DL (ref 70–110)
GLUCOSE SERPL-MCNC: 152 MG/DL
GLUCOSE SERPL-MCNC: 159 MG/DL (ref 70–110)
GLUCOSE SERPL-MCNC: 162 MG/DL (ref 70–110)
GLUCOSE SERPL-MCNC: 168 MG/DL (ref 70–110)
GLUCOSE SERPL-MCNC: 191 MG/DL (ref 70–110)
GLUCOSE SERPL-MCNC: 197 MG/DL (ref 70–110)
GLUCOSE SERPL-MCNC: 85 MG/DL
HBA1C MFR BLD HPLC: 5.3 %
HCO3 UR-SCNC: 22.4 MMOL/L (ref 24–28)
HCO3 UR-SCNC: 24.1 MMOL/L (ref 24–28)
HCO3 UR-SCNC: 24.5 MMOL/L (ref 24–28)
HCO3 UR-SCNC: 25 MMOL/L (ref 24–28)
HCO3 UR-SCNC: 25.4 MMOL/L (ref 24–28)
HCO3 UR-SCNC: 26.3 MMOL/L (ref 24–28)
HCO3 UR-SCNC: 27.1 MMOL/L (ref 24–28)
HCT VFR BLD AUTO: 36.3 %
HCT VFR BLD AUTO: 48.5 %
HCT VFR BLD CALC: 26 %PCV (ref 36–54)
HCT VFR BLD CALC: 30 %PCV (ref 36–54)
HCT VFR BLD CALC: 31 %PCV (ref 36–54)
HCT VFR BLD CALC: 32 %PCV (ref 36–54)
HCT VFR BLD CALC: 33 %PCV (ref 36–54)
HCT VFR BLD CALC: 34 %PCV (ref 36–54)
HCT VFR BLD CALC: 44 %PCV (ref 36–54)
HGB BLD-MCNC: 12.6 G/DL
HGB BLD-MCNC: 16.9 G/DL
INR PPP: 1.1
INR PPP: 1.3
LYMPHOCYTES # BLD AUTO: 1.2 K/UL
LYMPHOCYTES # BLD AUTO: 2.1 K/UL
LYMPHOCYTES NFR BLD: 28.2 %
LYMPHOCYTES NFR BLD: 7 %
MAGNESIUM SERPL-MCNC: 2.4 MG/DL
MCH RBC QN AUTO: 30.4 PG
MCH RBC QN AUTO: 30.5 PG
MCHC RBC AUTO-ENTMCNC: 34.7 G/DL
MCHC RBC AUTO-ENTMCNC: 34.8 G/DL
MCV RBC AUTO: 87 FL
MCV RBC AUTO: 88 FL
MONOCYTES # BLD AUTO: 0.9 K/UL
MONOCYTES # BLD AUTO: 1.8 K/UL
MONOCYTES NFR BLD: 10.3 %
MONOCYTES NFR BLD: 11.5 %
NEUTROPHILS # BLD AUTO: 14.4 K/UL
NEUTROPHILS # BLD AUTO: 4.1 K/UL
NEUTROPHILS NFR BLD: 55.3 %
NEUTROPHILS NFR BLD: 82.2 %
PCO2 BLDA: 37.6 MMHG (ref 35–45)
PCO2 BLDA: 38.1 MMHG (ref 35–45)
PCO2 BLDA: 42.3 MMHG (ref 35–45)
PCO2 BLDA: 43.5 MMHG (ref 35–45)
PCO2 BLDA: 44.2 MMHG (ref 35–45)
PCO2 BLDA: 45.3 MMHG (ref 35–45)
PCO2 BLDA: 52.2 MMHG (ref 35–45)
PH SMN: 7.29 [PH] (ref 7.35–7.45)
PH SMN: 7.33 [PH] (ref 7.35–7.45)
PH SMN: 7.35 [PH] (ref 7.35–7.45)
PH SMN: 7.35 [PH] (ref 7.35–7.45)
PH SMN: 7.38 [PH] (ref 7.35–7.45)
PH SMN: 7.42 [PH] (ref 7.35–7.45)
PH SMN: 7.46 [PH] (ref 7.35–7.45)
PHOSPHATE SERPL-MCNC: 3.8 MG/DL
PLATELET # BLD AUTO: 142 K/UL
PLATELET # BLD AUTO: 217 K/UL
PMV BLD AUTO: 8.7 FL
PMV BLD AUTO: 8.8 FL
PO2 BLDA: 317 MMHG (ref 80–100)
PO2 BLDA: 372 MMHG (ref 80–100)
PO2 BLDA: 428 MMHG (ref 80–100)
PO2 BLDA: 46 MMHG (ref 40–60)
PO2 BLDA: 528 MMHG (ref 80–100)
PO2 BLDA: 72 MMHG (ref 80–100)
PO2 BLDA: 75 MMHG (ref 80–100)
POC BE: -1 MMOL/L
POC BE: -1 MMOL/L
POC BE: -2 MMOL/L
POC BE: -4 MMOL/L
POC BE: 0 MMOL/L
POC BE: 1 MMOL/L
POC BE: 3 MMOL/L
POC IONIZED CALCIUM: 0.82 MMOL/L (ref 1.06–1.42)
POC IONIZED CALCIUM: 0.9 MMOL/L (ref 1.06–1.42)
POC IONIZED CALCIUM: 0.96 MMOL/L (ref 1.06–1.42)
POC IONIZED CALCIUM: 1.04 MMOL/L (ref 1.06–1.42)
POC IONIZED CALCIUM: 1.08 MMOL/L (ref 1.06–1.42)
POC IONIZED CALCIUM: 1.13 MMOL/L (ref 1.06–1.42)
POC IONIZED CALCIUM: 1.2 MMOL/L (ref 1.06–1.42)
POC SATURATED O2: 100 % (ref 95–100)
POC SATURATED O2: 79 % (ref 95–100)
POC SATURATED O2: 93 % (ref 95–100)
POC SATURATED O2: 93 % (ref 95–100)
POC TCO2: 24 MMOL/L (ref 24–29)
POC TCO2: 25 MMOL/L (ref 23–27)
POC TCO2: 26 MMOL/L (ref 23–27)
POC TCO2: 26 MMOL/L (ref 23–27)
POC TCO2: 27 MMOL/L (ref 23–27)
POC TCO2: 28 MMOL/L (ref 23–27)
POC TCO2: 28 MMOL/L (ref 23–27)
POTASSIUM BLD-SCNC: 4.2 MMOL/L (ref 3.5–5.1)
POTASSIUM BLD-SCNC: 4.6 MMOL/L (ref 3.5–5.1)
POTASSIUM BLD-SCNC: 5.3 MMOL/L (ref 3.5–5.1)
POTASSIUM BLD-SCNC: 6.3 MMOL/L (ref 3.5–5.1)
POTASSIUM BLD-SCNC: 6.4 MMOL/L (ref 3.5–5.1)
POTASSIUM BLD-SCNC: 7.1 MMOL/L (ref 3.5–5.1)
POTASSIUM BLD-SCNC: 7.6 MMOL/L (ref 3.5–5.1)
POTASSIUM SERPL-SCNC: 4 MMOL/L
POTASSIUM SERPL-SCNC: 4.7 MMOL/L
POTASSIUM SERPL-SCNC: 4.7 MMOL/L
PROT SERPL-MCNC: 7.7 G/DL
PROTHROMBIN TIME: 11.3 SEC
PROTHROMBIN TIME: 13.3 SEC
RBC # BLD AUTO: 4.14 M/UL
RBC # BLD AUTO: 5.55 M/UL
SAMPLE: ABNORMAL
SITE: ABNORMAL
SODIUM BLD-SCNC: 131 MMOL/L (ref 136–145)
SODIUM BLD-SCNC: 134 MMOL/L (ref 136–145)
SODIUM BLD-SCNC: 138 MMOL/L (ref 136–145)
SODIUM BLD-SCNC: 139 MMOL/L (ref 136–145)
SODIUM BLD-SCNC: 139 MMOL/L (ref 136–145)
SODIUM BLD-SCNC: 141 MMOL/L (ref 136–145)
SODIUM BLD-SCNC: 143 MMOL/L (ref 136–145)
SODIUM SERPL-SCNC: 139 MMOL/L
SODIUM SERPL-SCNC: 142 MMOL/L
WBC # BLD AUTO: 17.53 K/UL
WBC # BLD AUTO: 7.42 K/UL

## 2017-10-06 PROCEDURE — 83036 HEMOGLOBIN GLYCOSYLATED A1C: CPT

## 2017-10-06 PROCEDURE — 85520 HEPARIN ASSAY: CPT

## 2017-10-06 PROCEDURE — C9399 UNCLASSIFIED DRUGS OR BIOLOG: HCPCS | Performed by: ANESTHESIOLOGY

## 2017-10-06 PROCEDURE — 83735 ASSAY OF MAGNESIUM: CPT

## 2017-10-06 PROCEDURE — 82803 BLOOD GASES ANY COMBINATION: CPT

## 2017-10-06 PROCEDURE — 021009W BYPASS CORONARY ARTERY, ONE ARTERY FROM AORTA WITH AUTOLOGOUS VENOUS TISSUE, OPEN APPROACH: ICD-10-PCS | Performed by: THORACIC SURGERY (CARDIOTHORACIC VASCULAR SURGERY)

## 2017-10-06 PROCEDURE — 27201423 OPTIME MED/SURG SUP & DEVICES STERILE SUPPLY: Performed by: THORACIC SURGERY (CARDIOTHORACIC VASCULAR SURGERY)

## 2017-10-06 PROCEDURE — 36000713 HC OR TIME LEV V EA ADD 15 MIN: Performed by: THORACIC SURGERY (CARDIOTHORACIC VASCULAR SURGERY)

## 2017-10-06 PROCEDURE — 93312 ECHO TRANSESOPHAGEAL: CPT | Mod: 59,,, | Performed by: ANESTHESIOLOGY

## 2017-10-06 PROCEDURE — 37000008 HC ANESTHESIA 1ST 15 MINUTES: Performed by: THORACIC SURGERY (CARDIOTHORACIC VASCULAR SURGERY)

## 2017-10-06 PROCEDURE — 27000221 HC OXYGEN, UP TO 24 HOURS

## 2017-10-06 PROCEDURE — 84132 ASSAY OF SERUM POTASSIUM: CPT

## 2017-10-06 PROCEDURE — 36620 INSERTION CATHETER ARTERY: CPT | Mod: 59,,, | Performed by: ANESTHESIOLOGY

## 2017-10-06 PROCEDURE — 99222 1ST HOSP IP/OBS MODERATE 55: CPT | Mod: ,,, | Performed by: NURSE PRACTITIONER

## 2017-10-06 PROCEDURE — 93005 ELECTROCARDIOGRAM TRACING: CPT

## 2017-10-06 PROCEDURE — 99222 1ST HOSP IP/OBS MODERATE 55: CPT | Mod: ,,, | Performed by: ANESTHESIOLOGY

## 2017-10-06 PROCEDURE — 33517 CABG ARTERY-VEIN SINGLE: CPT | Mod: ,,, | Performed by: THORACIC SURGERY (CARDIOTHORACIC VASCULAR SURGERY)

## 2017-10-06 PROCEDURE — 25000003 PHARM REV CODE 250: Performed by: THORACIC SURGERY (CARDIOTHORACIC VASCULAR SURGERY)

## 2017-10-06 PROCEDURE — 27200953 HC CARDIOPLEGIA SYSTEM

## 2017-10-06 PROCEDURE — 33534 CABG ARTERIAL TWO: CPT | Mod: ,,, | Performed by: THORACIC SURGERY (CARDIOTHORACIC VASCULAR SURGERY)

## 2017-10-06 PROCEDURE — 85730 THROMBOPLASTIN TIME PARTIAL: CPT

## 2017-10-06 PROCEDURE — 36415 COLL VENOUS BLD VENIPUNCTURE: CPT

## 2017-10-06 PROCEDURE — 94761 N-INVAS EAR/PLS OXIMETRY MLT: CPT

## 2017-10-06 PROCEDURE — 36556 INSERT NON-TUNNEL CV CATH: CPT | Mod: 59,,, | Performed by: ANESTHESIOLOGY

## 2017-10-06 PROCEDURE — D9220A PRA ANESTHESIA: Mod: ,,, | Performed by: ANESTHESIOLOGY

## 2017-10-06 PROCEDURE — 36592 COLLECT BLOOD FROM PICC: CPT

## 2017-10-06 PROCEDURE — 80048 BASIC METABOLIC PNL TOTAL CA: CPT

## 2017-10-06 PROCEDURE — 85610 PROTHROMBIN TIME: CPT | Mod: 91

## 2017-10-06 PROCEDURE — 5A1221Z PERFORMANCE OF CARDIAC OUTPUT, CONTINUOUS: ICD-10-PCS | Performed by: THORACIC SURGERY (CARDIOTHORACIC VASCULAR SURGERY)

## 2017-10-06 PROCEDURE — 02100Z8 BYPASS CORONARY ARTERY, ONE ARTERY FROM RIGHT INTERNAL MAMMARY, OPEN APPROACH: ICD-10-PCS | Performed by: THORACIC SURGERY (CARDIOTHORACIC VASCULAR SURGERY)

## 2017-10-06 PROCEDURE — 37000009 HC ANESTHESIA EA ADD 15 MINS: Performed by: THORACIC SURGERY (CARDIOTHORACIC VASCULAR SURGERY)

## 2017-10-06 PROCEDURE — 33508 ENDOSCOPIC VEIN HARVEST: CPT | Mod: ,,, | Performed by: THORACIC SURGERY (CARDIOTHORACIC VASCULAR SURGERY)

## 2017-10-06 PROCEDURE — 85014 HEMATOCRIT: CPT

## 2017-10-06 PROCEDURE — 63600175 PHARM REV CODE 636 W HCPCS: Performed by: THORACIC SURGERY (CARDIOTHORACIC VASCULAR SURGERY)

## 2017-10-06 PROCEDURE — 20000000 HC ICU ROOM

## 2017-10-06 PROCEDURE — C1751 CATH, INF, PER/CENT/MIDLINE: HCPCS | Performed by: ANESTHESIOLOGY

## 2017-10-06 PROCEDURE — 37799 UNLISTED PX VASCULAR SURGERY: CPT

## 2017-10-06 PROCEDURE — 25000003 PHARM REV CODE 250: Performed by: NURSE PRACTITIONER

## 2017-10-06 PROCEDURE — 25000003 PHARM REV CODE 250: Performed by: STUDENT IN AN ORGANIZED HEALTH CARE EDUCATION/TRAINING PROGRAM

## 2017-10-06 PROCEDURE — 93010 ELECTROCARDIOGRAM REPORT: CPT | Mod: ,,, | Performed by: INTERNAL MEDICINE

## 2017-10-06 PROCEDURE — 27100021 HC MULTIPORT INFUSION MANIFOLD: Performed by: ANESTHESIOLOGY

## 2017-10-06 PROCEDURE — 27000175 HC ADULT BYPASS PUMP

## 2017-10-06 PROCEDURE — 02100Z9 BYPASS CORONARY ARTERY, ONE ARTERY FROM LEFT INTERNAL MAMMARY, OPEN APPROACH: ICD-10-PCS | Performed by: THORACIC SURGERY (CARDIOTHORACIC VASCULAR SURGERY)

## 2017-10-06 PROCEDURE — A4216 STERILE WATER/SALINE, 10 ML: HCPCS | Performed by: THORACIC SURGERY (CARDIOTHORACIC VASCULAR SURGERY)

## 2017-10-06 PROCEDURE — 85025 COMPLETE CBC W/AUTO DIFF WBC: CPT | Mod: 91

## 2017-10-06 PROCEDURE — 27201015 HC HEMO-CONCENTRATOR

## 2017-10-06 PROCEDURE — 85610 PROTHROMBIN TIME: CPT

## 2017-10-06 PROCEDURE — 06BQ4ZZ EXCISION OF LEFT SAPHENOUS VEIN, PERCUTANEOUS ENDOSCOPIC APPROACH: ICD-10-PCS | Performed by: THORACIC SURGERY (CARDIOTHORACIC VASCULAR SURGERY)

## 2017-10-06 PROCEDURE — 27200678 HC TRANSDUCER MONITOR KIT TRIPLE: Performed by: ANESTHESIOLOGY

## 2017-10-06 PROCEDURE — 84295 ASSAY OF SERUM SODIUM: CPT

## 2017-10-06 PROCEDURE — S0028 INJECTION, FAMOTIDINE, 20 MG: HCPCS | Performed by: THORACIC SURGERY (CARDIOTHORACIC VASCULAR SURGERY)

## 2017-10-06 PROCEDURE — 94640 AIRWAY INHALATION TREATMENT: CPT

## 2017-10-06 PROCEDURE — 36000712 HC OR TIME LEV V 1ST 15 MIN: Performed by: THORACIC SURGERY (CARDIOTHORACIC VASCULAR SURGERY)

## 2017-10-06 PROCEDURE — C1729 CATH, DRAINAGE: HCPCS | Performed by: THORACIC SURGERY (CARDIOTHORACIC VASCULAR SURGERY)

## 2017-10-06 PROCEDURE — 84100 ASSAY OF PHOSPHORUS: CPT

## 2017-10-06 PROCEDURE — 27100088 HC CELL SAVER

## 2017-10-06 PROCEDURE — 25000242 PHARM REV CODE 250 ALT 637 W/ HCPCS: Performed by: THORACIC SURGERY (CARDIOTHORACIC VASCULAR SURGERY)

## 2017-10-06 PROCEDURE — 25000003 PHARM REV CODE 250: Performed by: ANESTHESIOLOGY

## 2017-10-06 PROCEDURE — 80053 COMPREHEN METABOLIC PANEL: CPT

## 2017-10-06 PROCEDURE — 99900035 HC TECH TIME PER 15 MIN (STAT)

## 2017-10-06 PROCEDURE — 63600175 PHARM REV CODE 636 W HCPCS: Performed by: ANESTHESIOLOGY

## 2017-10-06 PROCEDURE — 82330 ASSAY OF CALCIUM: CPT

## 2017-10-06 PROCEDURE — 27000191 HC C-V MONITORING

## 2017-10-06 RX ORDER — BACITRACIN 50000 [IU]/1
INJECTION, POWDER, FOR SOLUTION INTRAMUSCULAR
Status: DISCONTINUED | OUTPATIENT
Start: 2017-10-06 | End: 2017-10-06 | Stop reason: HOSPADM

## 2017-10-06 RX ORDER — METOCLOPRAMIDE HYDROCHLORIDE 5 MG/ML
5 INJECTION INTRAMUSCULAR; INTRAVENOUS EVERY 6 HOURS PRN
Status: DISCONTINUED | OUTPATIENT
Start: 2017-10-06 | End: 2017-10-10

## 2017-10-06 RX ORDER — ASPIRIN 325 MG
325 TABLET ORAL ONCE
Status: COMPLETED | OUTPATIENT
Start: 2017-10-06 | End: 2017-10-06

## 2017-10-06 RX ORDER — LACTULOSE 10 G/15ML
20 SOLUTION ORAL EVERY 6 HOURS PRN
Status: DISCONTINUED | OUTPATIENT
Start: 2017-10-06 | End: 2017-10-11

## 2017-10-06 RX ORDER — POTASSIUM CHLORIDE 29.8 MG/ML
40 INJECTION INTRAVENOUS
Status: DISCONTINUED | OUTPATIENT
Start: 2017-10-06 | End: 2017-10-10

## 2017-10-06 RX ORDER — MIDAZOLAM HYDROCHLORIDE 1 MG/ML
INJECTION, SOLUTION INTRAMUSCULAR; INTRAVENOUS
Status: DISCONTINUED | OUTPATIENT
Start: 2017-10-06 | End: 2017-10-06

## 2017-10-06 RX ORDER — FENTANYL CITRATE 50 UG/ML
INJECTION, SOLUTION INTRAMUSCULAR; INTRAVENOUS
Status: DISCONTINUED | OUTPATIENT
Start: 2017-10-06 | End: 2017-10-06

## 2017-10-06 RX ORDER — MAGNESIUM SULFATE HEPTAHYDRATE 40 MG/ML
2 INJECTION, SOLUTION INTRAVENOUS
Status: DISCONTINUED | OUTPATIENT
Start: 2017-10-06 | End: 2017-10-10

## 2017-10-06 RX ORDER — METOPROLOL TARTRATE 25 MG/1
25 TABLET, FILM COATED ORAL ONCE
Status: DISCONTINUED | OUTPATIENT
Start: 2017-10-06 | End: 2017-10-09

## 2017-10-06 RX ORDER — POLYETHYLENE GLYCOL 3350 17 G/17G
17 POWDER, FOR SOLUTION ORAL DAILY
Status: DISCONTINUED | OUTPATIENT
Start: 2017-10-06 | End: 2017-10-11 | Stop reason: HOSPADM

## 2017-10-06 RX ORDER — PROTAMINE SULFATE 10 MG/ML
INJECTION, SOLUTION INTRAVENOUS
Status: DISCONTINUED | OUTPATIENT
Start: 2017-10-06 | End: 2017-10-06

## 2017-10-06 RX ORDER — METOPROLOL TARTRATE 1 MG/ML
INJECTION, SOLUTION INTRAVENOUS
Status: DISCONTINUED | OUTPATIENT
Start: 2017-10-06 | End: 2017-10-06

## 2017-10-06 RX ORDER — PROPOFOL 10 MG/ML
VIAL (ML) INTRAVENOUS
Status: DISCONTINUED | OUTPATIENT
Start: 2017-10-06 | End: 2017-10-06

## 2017-10-06 RX ORDER — METOPROLOL TARTRATE 25 MG/1
25 TABLET, FILM COATED ORAL ONCE
Status: DISCONTINUED | OUTPATIENT
Start: 2017-10-06 | End: 2017-10-06

## 2017-10-06 RX ORDER — IPRATROPIUM BROMIDE AND ALBUTEROL SULFATE 2.5; .5 MG/3ML; MG/3ML
3 SOLUTION RESPIRATORY (INHALATION) EVERY 4 HOURS
Status: DISCONTINUED | OUTPATIENT
Start: 2017-10-06 | End: 2017-10-07

## 2017-10-06 RX ORDER — ASPIRIN 81 MG/1
81 TABLET ORAL DAILY
Status: DISCONTINUED | OUTPATIENT
Start: 2017-10-07 | End: 2017-10-11 | Stop reason: HOSPADM

## 2017-10-06 RX ORDER — HEPARIN SODIUM 1000 [USP'U]/ML
INJECTION, SOLUTION INTRAVENOUS; SUBCUTANEOUS
Status: DISCONTINUED | OUTPATIENT
Start: 2017-10-06 | End: 2017-10-06 | Stop reason: HOSPADM

## 2017-10-06 RX ORDER — OXYCODONE HYDROCHLORIDE 5 MG/1
5 TABLET ORAL EVERY 4 HOURS PRN
Status: DISCONTINUED | OUTPATIENT
Start: 2017-10-06 | End: 2017-10-06

## 2017-10-06 RX ORDER — FAMOTIDINE 10 MG/ML
20 INJECTION INTRAVENOUS 2 TIMES DAILY
Status: DISCONTINUED | OUTPATIENT
Start: 2017-10-06 | End: 2017-10-09

## 2017-10-06 RX ORDER — NOREPINEPHRINE BITARTRATE 1 MG/ML
INJECTION, SOLUTION INTRAVENOUS
Status: DISCONTINUED | OUTPATIENT
Start: 2017-10-06 | End: 2017-10-06

## 2017-10-06 RX ORDER — ROCURONIUM BROMIDE 10 MG/ML
INJECTION, SOLUTION INTRAVENOUS
Status: DISCONTINUED | OUTPATIENT
Start: 2017-10-06 | End: 2017-10-06

## 2017-10-06 RX ORDER — MUPIROCIN 20 MG/G
1 OINTMENT TOPICAL 2 TIMES DAILY
Status: COMPLETED | OUTPATIENT
Start: 2017-10-06 | End: 2017-10-09

## 2017-10-06 RX ORDER — SODIUM CHLORIDE 0.9 % (FLUSH) 0.9 %
3 SYRINGE (ML) INJECTION EVERY 8 HOURS
Status: DISCONTINUED | OUTPATIENT
Start: 2017-10-06 | End: 2017-10-11

## 2017-10-06 RX ORDER — HYDROMORPHONE HCL IN 0.9% NACL 6 MG/30 ML
PATIENT CONTROLLED ANALGESIA SYRINGE INTRAVENOUS CONTINUOUS
Status: DISCONTINUED | OUTPATIENT
Start: 2017-10-06 | End: 2017-10-06

## 2017-10-06 RX ORDER — POTASSIUM CHLORIDE 14.9 MG/ML
20 INJECTION INTRAVENOUS
Status: DISCONTINUED | OUTPATIENT
Start: 2017-10-06 | End: 2017-10-10

## 2017-10-06 RX ORDER — SODIUM CHLORIDE, SODIUM LACTATE, POTASSIUM CHLORIDE, CALCIUM CHLORIDE 600; 310; 30; 20 MG/100ML; MG/100ML; MG/100ML; MG/100ML
1000 INJECTION, SOLUTION INTRAVENOUS
Status: DISCONTINUED | OUTPATIENT
Start: 2017-10-06 | End: 2017-10-10

## 2017-10-06 RX ORDER — ONDANSETRON 2 MG/ML
INJECTION INTRAMUSCULAR; INTRAVENOUS
Status: DISCONTINUED | OUTPATIENT
Start: 2017-10-06 | End: 2017-10-06

## 2017-10-06 RX ORDER — SODIUM CHLORIDE 9 MG/ML
INJECTION, SOLUTION INTRAVENOUS CONTINUOUS PRN
Status: DISCONTINUED | OUTPATIENT
Start: 2017-10-06 | End: 2017-10-06

## 2017-10-06 RX ORDER — CEFAZOLIN SODIUM 2 G/50ML
2 SOLUTION INTRAVENOUS
Status: DISPENSED | OUTPATIENT
Start: 2017-10-06 | End: 2017-10-08

## 2017-10-06 RX ORDER — MUPIROCIN 20 MG/G
OINTMENT TOPICAL ONCE
Status: DISCONTINUED | OUTPATIENT
Start: 2017-10-06 | End: 2017-10-09

## 2017-10-06 RX ORDER — ASPIRIN 325 MG
325 TABLET ORAL DAILY
Status: DISCONTINUED | OUTPATIENT
Start: 2017-10-07 | End: 2017-10-06

## 2017-10-06 RX ORDER — ONDANSETRON 2 MG/ML
4 INJECTION INTRAMUSCULAR; INTRAVENOUS EVERY 12 HOURS PRN
Status: DISCONTINUED | OUTPATIENT
Start: 2017-10-06 | End: 2017-10-11

## 2017-10-06 RX ORDER — CEFAZOLIN SODIUM 1 G/3ML
INJECTION, POWDER, FOR SOLUTION INTRAMUSCULAR; INTRAVENOUS
Status: DISCONTINUED | OUTPATIENT
Start: 2017-10-06 | End: 2017-10-06

## 2017-10-06 RX ORDER — CLOPIDOGREL BISULFATE 75 MG/1
75 TABLET ORAL DAILY
Status: DISCONTINUED | OUTPATIENT
Start: 2017-10-07 | End: 2017-10-06

## 2017-10-06 RX ORDER — PROPOFOL 10 MG/ML
5 INJECTION, EMULSION INTRAVENOUS CONTINUOUS
Status: DISCONTINUED | OUTPATIENT
Start: 2017-10-06 | End: 2017-10-06

## 2017-10-06 RX ORDER — OXYCODONE HYDROCHLORIDE 5 MG/1
10 TABLET ORAL EVERY 6 HOURS PRN
Status: DISCONTINUED | OUTPATIENT
Start: 2017-10-06 | End: 2017-10-06

## 2017-10-06 RX ORDER — NOREPINEPHRINE BITARTRATE/D5W 4MG/250ML
0.02 PLASTIC BAG, INJECTION (ML) INTRAVENOUS CONTINUOUS
Status: DISCONTINUED | OUTPATIENT
Start: 2017-10-06 | End: 2017-10-06

## 2017-10-06 RX ORDER — HEPARIN SODIUM 1000 [USP'U]/ML
INJECTION, SOLUTION INTRAVENOUS; SUBCUTANEOUS
Status: DISCONTINUED | OUTPATIENT
Start: 2017-10-06 | End: 2017-10-06

## 2017-10-06 RX ORDER — MUPIROCIN 20 MG/G
1 OINTMENT TOPICAL
Status: COMPLETED | OUTPATIENT
Start: 2017-10-06 | End: 2017-10-06

## 2017-10-06 RX ORDER — ALBUMIN HUMAN 50 G/1000ML
500 SOLUTION INTRAVENOUS
Status: DISCONTINUED | OUTPATIENT
Start: 2017-10-06 | End: 2017-10-10

## 2017-10-06 RX ORDER — CLOPIDOGREL BISULFATE 75 MG/1
75 TABLET ORAL ONCE
Status: DISCONTINUED | OUTPATIENT
Start: 2017-10-06 | End: 2017-10-06

## 2017-10-06 RX ORDER — POTASSIUM CHLORIDE 14.9 MG/ML
60 INJECTION INTRAVENOUS
Status: DISCONTINUED | OUTPATIENT
Start: 2017-10-06 | End: 2017-10-10

## 2017-10-06 RX ORDER — MUPIROCIN 20 MG/G
OINTMENT TOPICAL ONCE
Status: DISCONTINUED | OUTPATIENT
Start: 2017-10-06 | End: 2017-10-06

## 2017-10-06 RX ORDER — NALOXONE HCL 0.4 MG/ML
0.02 VIAL (ML) INJECTION
Status: DISCONTINUED | OUTPATIENT
Start: 2017-10-06 | End: 2017-10-06

## 2017-10-06 RX ORDER — NICARDIPINE HYDROCHLORIDE 0.2 MG/ML
5 INJECTION INTRAVENOUS CONTINUOUS
Status: DISCONTINUED | OUTPATIENT
Start: 2017-10-06 | End: 2017-10-10

## 2017-10-06 RX ORDER — KETAMINE HYDROCHLORIDE 100 MG/ML
INJECTION, SOLUTION INTRAMUSCULAR; INTRAVENOUS
Status: DISCONTINUED | OUTPATIENT
Start: 2017-10-06 | End: 2017-10-06

## 2017-10-06 RX ORDER — PHENYLEPHRINE HYDROCHLORIDE 10 MG/ML
INJECTION INTRAVENOUS
Status: DISCONTINUED | OUTPATIENT
Start: 2017-10-06 | End: 2017-10-06

## 2017-10-06 RX ORDER — LIDOCAINE HCL/PF 100 MG/5ML
SYRINGE (ML) INTRAVENOUS
Status: DISCONTINUED | OUTPATIENT
Start: 2017-10-06 | End: 2017-10-06

## 2017-10-06 RX ORDER — ACETAMINOPHEN 10 MG/ML
INJECTION, SOLUTION INTRAVENOUS
Status: DISCONTINUED | OUTPATIENT
Start: 2017-10-06 | End: 2017-10-06

## 2017-10-06 RX ORDER — PAPAVERINE HYDROCHLORIDE 30 MG/ML
INJECTION INTRAMUSCULAR; INTRAVENOUS
Status: DISCONTINUED | OUTPATIENT
Start: 2017-10-06 | End: 2017-10-06 | Stop reason: HOSPADM

## 2017-10-06 RX ORDER — MIDAZOLAM HYDROCHLORIDE 5 MG/ML
INJECTION INTRAMUSCULAR; INTRAVENOUS
Status: DISCONTINUED | OUTPATIENT
Start: 2017-10-06 | End: 2017-10-06

## 2017-10-06 RX ORDER — CLOPIDOGREL BISULFATE 75 MG/1
75 TABLET ORAL ONCE
Status: DISCONTINUED | OUTPATIENT
Start: 2017-10-06 | End: 2017-10-07

## 2017-10-06 RX ORDER — NALOXONE HCL 0.4 MG/ML
0.02 VIAL (ML) INJECTION
Status: DISCONTINUED | OUTPATIENT
Start: 2017-10-06 | End: 2017-10-07

## 2017-10-06 RX ADMIN — Medication 3 ML: at 03:10

## 2017-10-06 RX ADMIN — FAMOTIDINE 20 MG: 10 INJECTION, SOLUTION INTRAVENOUS at 10:10

## 2017-10-06 RX ADMIN — HYDROMORPHONE HYDROCHLORIDE: 2 INJECTION INTRAMUSCULAR; INTRAVENOUS; SUBCUTANEOUS at 07:10

## 2017-10-06 RX ADMIN — PHENYLEPHRINE HYDROCHLORIDE 100 MCG: 10 INJECTION INTRAVENOUS at 07:10

## 2017-10-06 RX ADMIN — NOREPINEPHRINE BITARTRATE 0.02 MG: 1 INJECTION, SOLUTION, CONCENTRATE INTRAVENOUS at 07:10

## 2017-10-06 RX ADMIN — KETAMINE HYDROCHLORIDE 40 MG: 100 INJECTION, SOLUTION, CONCENTRATE INTRAMUSCULAR; INTRAVENOUS at 07:10

## 2017-10-06 RX ADMIN — ROCURONIUM BROMIDE 100 MG: 10 INJECTION, SOLUTION INTRAVENOUS at 07:10

## 2017-10-06 RX ADMIN — KETAMINE HYDROCHLORIDE 20 MG: 100 INJECTION, SOLUTION, CONCENTRATE INTRAMUSCULAR; INTRAVENOUS at 09:10

## 2017-10-06 RX ADMIN — SODIUM CHLORIDE: 0.9 INJECTION, SOLUTION INTRAVENOUS at 07:10

## 2017-10-06 RX ADMIN — PHENYLEPHRINE HYDROCHLORIDE 200 MCG: 10 INJECTION INTRAVENOUS at 07:10

## 2017-10-06 RX ADMIN — KETAMINE HYDROCHLORIDE 20 MG: 100 INJECTION, SOLUTION, CONCENTRATE INTRAMUSCULAR; INTRAVENOUS at 10:10

## 2017-10-06 RX ADMIN — PHENYLEPHRINE HYDROCHLORIDE 200 MCG: 10 INJECTION INTRAVENOUS at 11:10

## 2017-10-06 RX ADMIN — FENTANYL CITRATE 150 MCG: 50 INJECTION, SOLUTION INTRAMUSCULAR; INTRAVENOUS at 07:10

## 2017-10-06 RX ADMIN — PROTAMINE SULFATE 200 MG: 10 INJECTION, SOLUTION INTRAVENOUS at 01:10

## 2017-10-06 RX ADMIN — ACETAMINOPHEN 1000 MG: 10 INJECTION, SOLUTION INTRAVENOUS at 07:10

## 2017-10-06 RX ADMIN — PHENYLEPHRINE HYDROCHLORIDE 300 MCG: 10 INJECTION INTRAVENOUS at 11:10

## 2017-10-06 RX ADMIN — POLYETHYLENE GLYCOL 3350 17 G: 17 POWDER, FOR SOLUTION ORAL at 04:10

## 2017-10-06 RX ADMIN — FENTANYL CITRATE 100 MCG: 50 INJECTION, SOLUTION INTRAMUSCULAR; INTRAVENOUS at 07:10

## 2017-10-06 RX ADMIN — MUPIROCIN 1 G: 20 OINTMENT TOPICAL at 06:10

## 2017-10-06 RX ADMIN — FENTANYL CITRATE 100 MCG: 50 INJECTION, SOLUTION INTRAMUSCULAR; INTRAVENOUS at 02:10

## 2017-10-06 RX ADMIN — HEPARIN SODIUM 22000 UNITS: 1000 INJECTION, SOLUTION INTRAVENOUS; SUBCUTANEOUS at 10:10

## 2017-10-06 RX ADMIN — PHENYLEPHRINE HYDROCHLORIDE 200 MCG: 10 INJECTION INTRAVENOUS at 09:10

## 2017-10-06 RX ADMIN — MUPIROCIN 1 G: 20 OINTMENT TOPICAL at 10:10

## 2017-10-06 RX ADMIN — MIDAZOLAM HYDROCHLORIDE 2 MG: 1 INJECTION, SOLUTION INTRAMUSCULAR; INTRAVENOUS at 07:10

## 2017-10-06 RX ADMIN — METOPROLOL TARTRATE 3 MG: 5 INJECTION, SOLUTION INTRAVENOUS at 07:10

## 2017-10-06 RX ADMIN — ROCURONIUM BROMIDE 50 MG: 10 INJECTION, SOLUTION INTRAVENOUS at 10:10

## 2017-10-06 RX ADMIN — ROCURONIUM BROMIDE 50 MG: 10 INJECTION, SOLUTION INTRAVENOUS at 01:10

## 2017-10-06 RX ADMIN — PHENYLEPHRINE HYDROCHLORIDE 100 MCG: 10 INJECTION INTRAVENOUS at 09:10

## 2017-10-06 RX ADMIN — CALCIUM CHLORIDE 1000 MG: 100 INJECTION, SOLUTION INTRAVENOUS at 01:10

## 2017-10-06 RX ADMIN — SODIUM CHLORIDE, SODIUM GLUCONATE, SODIUM ACETATE, POTASSIUM CHLORIDE, MAGNESIUM CHLORIDE, SODIUM PHOSPHATE, DIBASIC, AND POTASSIUM PHOSPHATE: .53; .5; .37; .037; .03; .012; .00082 INJECTION, SOLUTION INTRAVENOUS at 07:10

## 2017-10-06 RX ADMIN — LIDOCAINE HYDROCHLORIDE 80 MG: 20 INJECTION, SOLUTION INTRAVENOUS at 07:10

## 2017-10-06 RX ADMIN — Medication 3 ML: at 10:10

## 2017-10-06 RX ADMIN — METOPROLOL TARTRATE 2 MG: 5 INJECTION, SOLUTION INTRAVENOUS at 07:10

## 2017-10-06 RX ADMIN — CEFAZOLIN SODIUM 2 G: 2 SOLUTION INTRAVENOUS at 08:10

## 2017-10-06 RX ADMIN — SODIUM CHLORIDE: 0.9 INJECTION, SOLUTION INTRAVENOUS at 06:10

## 2017-10-06 RX ADMIN — IPRATROPIUM BROMIDE AND ALBUTEROL SULFATE 3 ML: .5; 3 SOLUTION RESPIRATORY (INHALATION) at 07:10

## 2017-10-06 RX ADMIN — PHENYLEPHRINE HYDROCHLORIDE 200 MCG: 10 INJECTION INTRAVENOUS at 10:10

## 2017-10-06 RX ADMIN — ONDANSETRON 4 MG: 2 INJECTION INTRAMUSCULAR; INTRAVENOUS at 10:10

## 2017-10-06 RX ADMIN — SODIUM CHLORIDE 2 UNITS/HR: 9 INJECTION, SOLUTION INTRAVENOUS at 12:10

## 2017-10-06 RX ADMIN — PROPOFOL 70 MG: 10 INJECTION, EMULSION INTRAVENOUS at 07:10

## 2017-10-06 RX ADMIN — MIDAZOLAM HYDROCHLORIDE 4 MG: 1 INJECTION, SOLUTION INTRAMUSCULAR; INTRAVENOUS at 07:10

## 2017-10-06 RX ADMIN — HEPARIN SODIUM 5000 UNITS: 1000 INJECTION, SOLUTION INTRAVENOUS; SUBCUTANEOUS at 10:10

## 2017-10-06 RX ADMIN — LIDOCAINE HYDROCHLORIDE 100 MG: 20 INJECTION, SOLUTION INTRAVENOUS at 01:10

## 2017-10-06 RX ADMIN — CEFAZOLIN 2 G: 1 INJECTION, POWDER, FOR SOLUTION INTRAVENOUS at 07:10

## 2017-10-06 RX ADMIN — CEFAZOLIN 2 G: 1 INJECTION, POWDER, FOR SOLUTION INTRAVENOUS at 11:10

## 2017-10-06 RX ADMIN — NOREPINEPHRINE BITARTRATE 0.04 MCG/KG/MIN: 1 INJECTION, SOLUTION, CONCENTRATE INTRAVENOUS at 07:10

## 2017-10-06 RX ADMIN — FENTANYL CITRATE 250 MCG: 50 INJECTION, SOLUTION INTRAMUSCULAR; INTRAVENOUS at 08:10

## 2017-10-06 RX ADMIN — ROCURONIUM BROMIDE 50 MG: 10 INJECTION, SOLUTION INTRAVENOUS at 08:10

## 2017-10-06 RX ADMIN — ASPIRIN 325 MG ORAL TABLET 325 MG: 325 PILL ORAL at 04:10

## 2017-10-06 RX ADMIN — OXYCODONE HYDROCHLORIDE 10 MG: 5 TABLET ORAL at 04:10

## 2017-10-06 RX ADMIN — SUGAMMADEX 400 MG: 100 INJECTION, SOLUTION INTRAVENOUS at 02:10

## 2017-10-06 NOTE — ANESTHESIA PROCEDURE NOTES
Arterial    Diagnosis: CAD  Doctor requesting consult: Roberto    Patient location during procedure: done in OR  Procedure start time: 10/6/2017 7:06 AM  Timeout: 10/6/2017 7:05 AM  Procedure end time: 10/6/2017 7:15 AM  Staffing  Anesthesiologist: DEMARCUS MCCONNELL  Other anesthesia staff: SERGIO VALE  Performed: anesthesiologist and other anesthesia staff   Anesthesiologist was present at the time of the procedure.  Preanesthetic Checklist  Completed: patient identified, site marked, surgical consent, pre-op evaluation, timeout performed, IV checked, risks and benefits discussed, monitors and equipment checked and anesthesia consent givenArterial  Skin Prep: chlorhexidine gluconate and isopropyl alcohol  Local Infiltration: lidocaine  Orientation: left  Location: radial  Catheter Size: 20 G  Catheter placement by Anatomical landmarks. Heme positive aspiration all ports.Insertion Attempts: 2  Assessment  Dressing: secured with tape and tegaderm  Patient: Tolerated well

## 2017-10-06 NOTE — ANESTHESIA PROCEDURE NOTES
Central Line    Diagnosis: CAD  Doctor requesting consult: Roberto  Patient location during procedure: done in OR  Procedure start time: 10/6/2017 7:16 AM  Timeout: 10/6/2017 7:15 AM  Procedure end time: 10/6/2017 7:30 AM  Staffing  Anesthesiologist: DEMARCUS MCCONNELL  Other anesthesia staff: SERGIO VALE  Performed: other anesthesia staff   Anesthesiologist was present at the time of the procedure.  Preanesthetic Checklist  Completed: patient identified, site marked, surgical consent, pre-op evaluation, timeout performed, IV checked, risks and benefits discussed, monitors and equipment checked and anesthesia consent given  Indication  Indication: hemodynamic monitoring, vascular access, med administration     Anesthesia   general anesthesia    Central Line  Skin Prep: skin prepped with ChloraPrep, skin prep agent completely dried prior to procedure  maximum sterile barriers used during central venous catheter insertion  hand hygiene performed prior to central venous catheter insertion  Location: right internal jugular,   Catheter type: quad lumen  Catheter Size: 8.5 Fr  Inserted Catheter Length: 15 cm  Ultrasound: vascular probe with ultrasound  Vessel Caliber: medium, patent  Vascular Doppler:  not done, compressibility normal  Needle advanced into vessel with real time Ultrasound guidance.  Guidewire confirmed in vessel.  Sterile sheath used.   Manometry: Venous cannualation confirmed by visual estimation of blood vessel pressure using manometry.  Insertion Attempts: 1   Securement:line sutured, chlorhexidine patch, sterile dressing applied and blood return through all ports     Post-Procedure  Adverse Events:none

## 2017-10-06 NOTE — ANESTHESIA POSTPROCEDURE EVALUATION
"Anesthesia Post Evaluation    Patient: Fausto Patterson    Procedure(s) Performed: Procedure(s) (LRB):  AORTOCORONARY BYPASS-CABG/CABGX3 (N/A)  HARVEST-VEIN-ENDOVASCULAR (Left)    Final Anesthesia Type: general  Patient location during evaluation: ICU  Patient participation: Yes- Able to Participate  Level of consciousness: awake and alert and oriented  Post-procedure vital signs: reviewed and stable  Pain management: adequate  Airway patency: patent  PONV status at discharge: No PONV  Anesthetic complications: no      Cardiovascular status: hemodynamically stable  Respiratory status: nasal cannula and unassisted  Hydration status: euvolemic  Follow-up not needed.        Visit Vitals  /70 (BP Location: Right arm, Patient Position: Lying)   Pulse 96   Temp 36.5 °C (97.7 °F) (Oral)   Resp (!) 36   Ht 5' 9" (1.753 m)   Wt 81.6 kg (180 lb)   SpO2 100%   BMI 26.58 kg/m²       Pain/Radha Score: Pain Assessment Performed: Yes (10/6/2017  3:09 PM)  Presence of Pain: complains of pain/discomfort (10/6/2017  3:09 PM)  Pain Rating Prior to Med Admin: 9 (10/6/2017  4:40 PM)  Pain Rating Post Med Admin: 0 (10/6/2017  5:52 AM)      "

## 2017-10-06 NOTE — BRIEF OP NOTE
[facility]  Brief Operative Note    SUMMARY     Surgery Date: 10/6/2017     Surgeon(s) and Role:     * Joe Mejia MD - Primary     * Ever Mendoza MD - Fellow    Assisting Surgeon: None    Pre-op Diagnosis:  Coronary artery disease involving native coronary artery of native heart without angina pectoris [I25.10]    Post-op Diagnosis:  Post-Op Diagnosis Codes:     * Coronary artery disease involving native coronary artery of native heart without angina pectoris [I25.10]    Procedure(s) (LRB):  AORTOCORONARY BYPASS-CABG/CABGX3 (N/A) 64003, 43677, 42743  HARVEST-VEIN-ENDOVASCULAR (Left)    Anesthesia: General    Description of Procedure: BETH to RCA, SVG to OM1, LIMA to LAD    Description of the findings of the procedure: excellent graft run off    Estimated Blood Loss: 100 ml         Specimens:   Specimen (12h ago through future)    None

## 2017-10-06 NOTE — PROGRESS NOTES
Pt admitted to [id number] per Anesthesia team. Pt connected to ICU monitors and O2. Dr. Chamberlain and Dr. Mendoza at bedside for eval.

## 2017-10-06 NOTE — ANESTHESIA PROCEDURE NOTES
Final BUBBA    Diagnosis: CAD  Patient location during procedure: OR  Procedure start time: 10/6/2017 1:31 PM  Timeout: 10/6/2017 1:30 PM  Procedure end time: 10/6/2017 1:45 PM  Surgery related to: CAD  Exam type: Final  Staffing  Anesthesiologist: DEMARCUS MCCONNELL  Other anesthesia staff: SERGIO VALE  Performed: anesthesiologist and other anesthesia staff   Preanesthetic Checklist  Completed: patient identified, surgical consent, pre-op evaluation, timeout performed, risks and benefits discussed, monitors and equipment checked, anesthesia consent given, oxygen available, suction available, hand hygiene performed and patient being monitored    Exam  Estimated Ejection Fraction: > 55% normal  Regional Wall Abnormalities: no RWMA        Right Heart  Right Ventricle dilated: no  Right Ventricle Function: normal      Aortic Valve:  Prosthesis: none  Regurgitation(color flow): 0-tr     Mitral Valve:  Prosthesis: none  Regurgitation(color flow): 1+     Tricuspid Valve:  Prosthesis: none  Regurgitation: 0-tr    Pulmonic Valve:  Prosthesis: none  Regurgitation(color flow): 0-tr      Aorta  Descending Aorta dissection: no  Descending aorta IABP: no  Aortic Arch Dissection: no  Ascending Aorta Dissection: no    LVAD:no        Effusions    Summary    Other Findings  Normal biventricular function s/p CABG x3  Mild MR, no other valvular issues

## 2017-10-06 NOTE — NURSING TRANSFER
Nursing Transfer Note      10/6/2017     Transfer To: Preop    Transfer via wheelchair    Transfer with cardiac monitoring    Transported by Transport    Medicines sent: none    Chart send with patient: Yes    Notified: spouse

## 2017-10-06 NOTE — H&P
"History & Physical  Surgical Intensive Care    SUBJECTIVE:     Chief Complaint/Reason for Admission: CABGx3 SICU admission    History of Present Illness:  Mr Fausto Patterson is a 47 y.o. 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 Callahan, for 1-2 months of intermittent bilateral chest pain with moderate to strenuous exercise while doing an intense workout regimen, the P90X program. The chest pain was always alleviated by rest. He had a prior episode of chest pain with exertion and vertigo for which he went to the ED, had an EKG, and was d/c-ed home. Today, on the treadmill test, he reached goal HR and BP and started having chest pain after 9 minutes, with ST depressions in inferolateral leads for 15-18 mins, and chest pain resolved after 18 minutes of rest. No nitro given. 2D Echo with CFD showed a normal heart with LVEF 65, normal diastolic function, mildly elevated PA pressure of 26 mmHg.        He went emergently to cath lab, found to have multivessel CAD so no PCI done await CTS consult. No complaints on admit. Never had chest pain at rest, only with exertion. No palpitations, syncope, f/c, or other sx.     Presents to SICU today after 3 vessel CABG. Currently hemodynamically stable on non rebreather mask. Pleura vac x 2 with minimal output since pt has been on floor. Management per CTS team.     PTA Medications   Medication Sig    simvastatin (ZOCOR) 40 MG tablet Take 1 tablet (40 mg total) by mouth once daily.       Review of patient's allergies indicates:   Allergen Reactions    Clarithromycin Other (See Comments)     hiccups    Levaquin [levofloxacin] Hives    Naldecon-ex Hives       Past " Medical History:   Diagnosis Date    Hyperlipidemia     Kidney stone     Schatzki's ring     Unspecified disorder of kidney and ureter     calcium oxalate nephrolithiasis     History reviewed. No pertinent surgical history.  Family History   Problem Relation Age of Onset    No Known Problems Mother     Heart disease Father      cad    Hypertension Sister     No Known Problems Daughter     No Known Problems Daughter      Social History   Substance Use Topics    Smoking status: Never Smoker    Smokeless tobacco: Never Used    Alcohol use No        Review of Systems:  ROS      OBJECTIVE:     Vital Signs (Most Recent)  Temp: 97.7 °F (36.5 °C) (10/06/17 1500)  Pulse: 92 (10/06/17 1530)  Resp: (!) 28 (10/06/17 1530)  BP: (!) 102/56 (10/06/17 1500)  SpO2: 100 % (10/06/17 1530)  Ventilator Data (Last 24H):          Hemodynamic Parameters (Last 24H):       Physical Exam  Gen:  No acute distress  CV:  Peripherally well-perfused.  Pulses 2+ bilaterally. RRR  Lungs:  Normal respiratory effort. Lungs clear to auscultation.   Abdomen:  Soft, non-tender, non-distended  Head/Neck:  Normocephalic.  Atraumatic. No TTP, AROM and PROM intact without pain  Neuro:  CN intact without deficit, SILT throughout B/L Upper & Lower Extremities  Pelvis: No TTP, Stable to direct anterior pressure over ASIS.      Lines/Drains:       Peripheral IV - Single Lumen 10/02/17 1208 Left Hand (Active)   Site Assessment Clean;Dry;Intact;No redness;No swelling 10/5/2017  8:00 PM   Line Status Flushed;Saline locked 10/5/2017  8:00 PM   Dressing Status Clean;Dry;Intact 10/5/2017  8:00 PM   Dressing Intervention Dressing reinforced 10/5/2017  8:00 PM   Dressing Change Due 10/06/17 10/5/2017  8:00 PM   Site Change Due 10/06/17 10/5/2017  8:00 PM   Reason Not Rotated Not due 10/5/2017  8:00 PM   Number of days: 4            Urethral Catheter 10/06/17 0723 Straight-tip;Temperature probe;Non-latex 16 Fr. (Active)   Output (mL) 200 mL 10/6/2017  3:00 PM    Number of days: 0            Y Chest Tube 1 and 2 10/06/17 1347 1 Right Mediastinal 32 Fr. 2 Left Mediastinal 32 Fr. (Active)   Output (mL) 170 mL 10/6/2017  3:00 PM   Number of days: 0            Y Chest Tube 3 and 4 10/06/17 1349 3 Right Pleural 32 Fr. 4 Left Pleural 32 Fr. (Active)   Output (mL) 51 mL 10/6/2017  3:00 PM   Number of days: 0       Laboratory  CBC:   Recent Labs  Lab 10/06/17  0413  10/06/17  1501   WBC 7.42  --   --    RBC 5.55  --   --    HGB 16.9  --   --    HCT 48.5  < > 34*     --   --    MCV 87  --   --    MCH 30.5  --   --    MCHC 34.8  --   --    < > = values in this interval not displayed.  BMP:   Recent Labs  Lab 10/06/17  1500   *      K 4.7  4.7      CO2 25   BUN 14   CREATININE 1.3   CALCIUM 7.6*   MG 2.4     CMP:   Recent Labs  Lab 10/06/17  0413 10/06/17  1500   GLU 85 152*   CALCIUM 9.8 7.6*   ALBUMIN 4.0  --    PROT 7.7  --     142   K 4.0 4.7  4.7   CO2 24 25    108   BUN 13 14   CREATININE 1.2 1.3   ALKPHOS 68  --    ALT 27  --    AST 18  --    BILITOT 1.6*  --      LFTs:   Recent Labs  Lab 10/06/17  0413   ALT 27   AST 18   ALKPHOS 68   BILITOT 1.6*   PROT 7.7   ALBUMIN 4.0     Coagulation:   Recent Labs  Lab 10/06/17  1500   LABPROT 13.3*   INR 1.3*   APTT 34.3*     Cardiac markers: No results for input(s): CKMB, CPKMB, TROPONINT, TROPONINI, MYOGLOBIN in the last 168 hours.      Chest X-Ray: X-ray Chest Pa And Lateral    Result Date: 10/6/2017   No significant intrathoracic abnormality.  No significant detrimental interval change in the appearance of the chest since 10/23/07. Electronically signed by: Darin Cody MD Date:     10/06/17 Time:    05:39     X-ray Chest Ap Portable    Result Date: 10/6/2017  Satisfactory postoperative chest radiograph with sternal sutures well aligned. Electronically signed by: Karolina Quick MD Date:     10/06/17 Time:    15:34         ASSESSMENT/PLAN:   Fausto Patterson is a 47 y.o. male s/p 3 vessel CABG today  with a stable postoperative course. Will continue to monitor in SICU      Plan:    Neuro:   - Pain Control per primary     Pulmonary:   - Extubated on non re breather saturating well.         Recent Labs  Lab 10/06/17  1501   PH 7.295*   PCO2 52.2*   PO2 75*   HCO3 25.4   POCSATURATED 93*   BE -1     - Chest tubes to suction wit (20cc output since being on floor)    Cardiac:  -MAP goal >65  - Management per CTS team     Renal:   -Mcneal in place   - Continue to follow UOP    Fluids/Electrolytes/Nutrition/GI:   -replace lytes PRN  -maintenance fluids   - Strict I/O  -NGT/G tube output    Hematology/Oncology:  -H/H stable, continue to follow  -INR/Plts    Infectious Disease:   -Afebrile  -WBC wnl     Endocrine:  -Glucose goal of 120-150  -SSI    Dispo:  -Continue care in the ICU setting    Juan M Kumar M.D. PGY1  SICU

## 2017-10-06 NOTE — PROGRESS NOTES
Central monitoring called to notify tele box taken off and will be at charge nurse desk for . Pt on cardiac monitor.

## 2017-10-06 NOTE — CONSULTS
[facility]  Endocrinology  Diabetes Consult Note    Consult Requested by: Joe Mejia MD   Reason for admit: CAD (coronary artery disease), native coronary artery    HISTORY OF PRESENT ILLNESS:  Reason for Consult: Management of  Hyperglycemia     Surgical Procedure and Date: CABG x3 on 10/6/17      HPI:   Patient is a 47 y.o. male with a diagnosis of HLD, HTN, schatzki ring s/p dilation, acute angina during treadmill stress test with subsequent left heart cath identifying significant CAD. Now s/p CABG x3.   No previous history of diabetes. Denies family history of diabetes.   Endocrine consulted for postoperative BG management.      Extubated prior to arrival to ICU. Drowsy on assessment. On CT insulin infusion. BG near goal.   Eating:   NPO   Hypoglycemia and intervention: No  Fever: No  TPN and/or TF: No      PMH, PSH, FH, SH updated and reviewed     ROS:  Unable to obtain due to: Drowsiness; Reviewed ROS from note dated 10/6/2017 per Dr. JEREMIAH Crawford     Review of Systems    Current Medications and/or Treatments Impacting Glycemic Control  Immunotherapy:    Immunosuppressants     None        Steroids:   Hormones     None        Pressors:    Autonomic Drugs     None        Hyperglycemia/Diabetes Medications:   Antihyperglycemics     Start     Stop Route Frequency Ordered    10/06/17 1545  insulin regular (Humulin R) 100 Units in sodium chloride 0.9% 100 mL infusion      -- IV Continuous 10/06/17 1458               PHYSICAL EXAMINATION:Vitals:    10/06/17 1500   BP: (!) 102/56   Pulse: 86   Resp: (!) 21   Temp: 97.7 °F (36.5 °C)     Body mass index is 26.58 kg/m².    Physical Exam   Constitutional: He appears well-developed and well-nourished. Face mask in place.   HENT:   Head: Normocephalic.   Neck: Neck supple.   Cardiovascular: Normal rate and intact distal pulses.  Exam reveals friction rub.    Pulmonary/Chest: Effort normal and breath sounds normal. No respiratory distress.   Chest tubes  with sanguineous drainage    Abdominal: Soft. He exhibits no distension. Bowel sounds are decreased. There is no tenderness.   Musculoskeletal: He exhibits no edema or deformity.   Neurological:   Drowsy    Skin: Skin is warm and dry. Capillary refill takes less than 2 seconds.   Mid sternal incision dressing CDI.    Psychiatric:   RACHEAL drowsy          Labs Reviewed and Include     Recent Labs  Lab 10/06/17  0413 10/06/17  1500   GLU 85 152*   CALCIUM 9.8 7.6*   ALBUMIN 4.0  --    PROT 7.7  --     142   K 4.0 4.7  4.7   CO2 24 25    108   BUN 13 14   CREATININE 1.2 1.3   ALKPHOS 68  --    ALT 27  --    AST 18  --    BILITOT 1.6*  --      Lab Results   Component Value Date    WBC 17.53 (H) 10/06/2017    HGB 12.6 (L) 10/06/2017    HCT 34 (L) 10/06/2017    MCV 88 10/06/2017     (L) 10/06/2017     No results for input(s): TSH, FREET4 in the last 168 hours.  No results found for: HGBA1C    Nutritional status:   Body mass index is 26.58 kg/m².  Lab Results   Component Value Date    ALBUMIN 4.0 10/06/2017    ALBUMIN 4.0 10/05/2017    ALBUMIN 4.5 10/04/2017     No results found for: PREALBUMIN    Estimated Creatinine Clearance: 69.5 mL/min (based on SCr of 1.3 mg/dL).    Accu-Checks  No results for input(s): POCTGLUCOSE in the last 72 hours.     ASSESSMENT and PLAN    * CAD (coronary artery disease), native coronary artery    S/p CABG. Avoid hypoglycemia.           Stress hyperglycemia    BG goal 110-140.   Continue CTS insulin infusion with hourly BG monitoring.   Order A1c.           S/P CABG x 3    Per CTS.   Avoid hypoglycemia.               Plan discussed with patient and RN at bedside.     America Randolph NP  Endocrinology  [facility]

## 2017-10-06 NOTE — PLAN OF CARE
Problem: Patient Care Overview  Goal: Plan of Care Review  Outcome: Ongoing (interventions implemented as appropriate)  All VSS. Pt currently on 3L NC with O2 sat 100%. Bedside swallow performed; pt tolerated well and was able to take PO meds. Pt had complaints of pain; PRN 10mg oxycodone given. PCA dilaudid ordered. Gtts: Insulin @ 1.6units/hr and TXA @ 1mg/kg/hr. UOP >100ml/hr. Chest tube output minimal throughout shift. Q1 accuchecks performed. Midsternal dressing clean, dry, intact. V pacer wires taped to dressing. Pt's wife at bedside shortly early. Plan of care reviewed with pt and pt's spouse. All questions answered. Will continue to monitor pt status.

## 2017-10-06 NOTE — HPI
Reason for Consult: Management of  Hyperglycemia     Surgical Procedure and Date: CABG x3 on 10/6/17    HPI:   Patient is a 47 y.o. male with a diagnosis of HLD, HTN, schatzki ring s/p dilation, acute angina during treadmill stress test with subsequent left heart cath identifying significant CAD. Now s/p CABG x3. No previous history of diabetes. Denies family history of diabetes.   Endocrine consulted for postoperative BG management.

## 2017-10-06 NOTE — PROGRESS NOTES
Skin note: Pt's sacrum, heels, elbows and all other areas of skin free from breakdown. Pt turned Q2h to prevent further skin breakdown. Will continue to monitor patient.

## 2017-10-06 NOTE — PLAN OF CARE
Problem: Patient Care Overview  Goal: Plan of Care Review  Pt. Remains free from falls/ injury/trauma. No complaints of CP, SOB, or discomfort. CABG schedule for tomorrow morning.  Hibiclens scrub performed. Plan of Care reviewed with patient. VSS and NADN. Will continue to monitor.

## 2017-10-06 NOTE — TRANSFER OF CARE
"Anesthesia Transfer of Care Note    Patient: Fausto Patterson    Procedure(s) Performed: Procedure(s) (LRB):  AORTOCORONARY BYPASS-CABG/CABGX3 (N/A)  HARVEST-VEIN-ENDOVASCULAR (Left)    Patient location: ICU    Anesthesia Type: general    Transport from OR: Transported from OR on 6-10 L/min O2 by face mask with adequate spontaneous ventilation    Post pain: adequate analgesia    Post assessment: no apparent anesthetic complications    Post vital signs: stable    Level of consciousness: awake and responds to stimulation    Nausea/Vomiting: no nausea/vomiting    Complications: none    Transfer of care protocol was followed      Last vitals:   Visit Vitals  BP (!) 154/87 (BP Location: Right arm, Patient Position: Lying)   Pulse 88   Temp 36.5 °C (97.7 °F) (Oral)   Resp 18   Ht 5' 9" (1.753 m)   Wt 81.6 kg (180 lb)   SpO2 99%   BMI 26.58 kg/m²     "

## 2017-10-06 NOTE — SUBJECTIVE & OBJECTIVE
Extubated prior to arrival to ICU. Drowsy on assessment. On CT insulin infusion.   Eating:   NPO   Hypoglycemia and intervention: No  Fever: No  TPN and/or TF: No      PMH, PSH, FH, SH updated and reviewed     ROS:  Unable to obtain due to: Drowsiness; Reviewed ROS from note dated 10/6/2017 per Dr. JEREMIAH Crawford     Review of Systems    Current Medications and/or Treatments Impacting Glycemic Control  Immunotherapy:    Immunosuppressants     None        Steroids:   Hormones     None        Pressors:    Autonomic Drugs     None        Hyperglycemia/Diabetes Medications:   Antihyperglycemics     Start     Stop Route Frequency Ordered    10/06/17 1545  insulin regular (Humulin R) 100 Units in sodium chloride 0.9% 100 mL infusion      -- IV Continuous 10/06/17 1458               PHYSICAL EXAMINATION:Vitals:    10/06/17 1500   BP: (!) 102/56   Pulse: 86   Resp: (!) 21   Temp: 97.7 °F (36.5 °C)     Body mass index is 26.58 kg/m².    Physical Exam   Constitutional: He appears well-developed and well-nourished. Face mask in place.   HENT:   Head: Normocephalic.   Neck: Neck supple.   Cardiovascular: Normal rate and intact distal pulses.  Exam reveals friction rub.    Pulmonary/Chest: Effort normal and breath sounds normal. No respiratory distress.   Chest tubes with sanguineous drainage    Abdominal: Soft. He exhibits no distension. Bowel sounds are decreased. There is no tenderness.   Musculoskeletal: He exhibits no edema or deformity.   Neurological:   Drowsy    Skin: Skin is warm and dry. Capillary refill takes less than 2 seconds.   Mid sternal incision dressing CDI.    Psychiatric:   RACHEAL drowsy

## 2017-10-06 NOTE — H&P
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 Callahan, for 1-2 months of intermittent bilateral chest pain with moderate to strenuous exercise while doing an intense workout regimen, the P90X program. The chest pain was always alleviated by rest. He had a prior episode of chest pain with exertion and vertigo for which he went to the ED, had an EKG, and was d/c-ed home. Today, on the treadmill test, he reached goal HR and BP and started having chest pain after 9 minutes, with ST depressions in inferolateral leads for 15-18 mins, and chest pain resolved after 18 minutes of rest. No nitro given. 2D Echo with CFD showed a normal heart with LVEF 65, normal diastolic function, mildly elevated PA pressure of 26 mmHg.       Echo fellow, Dr Spencer Buitrago, called Interventional Cardiology for cath and called [facility] [place].      He is a never smoker, does not drink EtOH, no illicits.   Fam Hx: F -  of Mi at 55, M - PAD/carotid stents, GF  of MI at age 85     He went emergently to cath lab, found to have multivessel CAD so no PCI done await CTS consult. No complaints on admit. " Never had chest pain at rest, only with exertion. No palpitations, syncope, f/c, or other sx.      Medications:  Continuous Infusions:   Scheduled Meds:   aspirin  81 mg Oral Daily    atorvastatin  40 mg Oral Daily    metoprolol succinate  50 mg Oral Daily      PRN Meds:      Objective:      Vital Signs (Most Recent):  Temp: 98.5 °F (36.9 °C) (10/03/17 0850)  Pulse: 90 (10/03/17 1000)  Resp: 18 (10/03/17 0850)  BP: (!) 139/90 (10/03/17 0851)  SpO2: 97 % (10/03/17 0850) Vital Signs (24h Range):  Temp:  [97.6 °F (36.4 °C)-98.5 °F (36.9 °C)] 98.5 °F (36.9 °C)  Pulse:  [56-90] 90  Resp:  [16-18] 18  SpO2:  [94 %-97 %] 97 %  BP: (118-153)/(68-92) 139/90      Weight: 83 kg (182 lb 14 oz)  Body mass index is 27.01 kg/m².     SpO2: 97 %  O2 Device (Oxygen Therapy): room air      ROS:     Review of Systems   Constitutional: Negative for fever and malaise/fatigue.   HENT: Negative for congestion and sore throat.    Eyes: Negative for blurred vision, double vision and discharge.   Respiratory: Negative for cough, shortness of breath and wheezing.    Cardiovascular: see HPI  Gastrointestinal: Negative for abdominal pain, constipation, diarrhea, nausea and vomiting.   Genitourinary: Negative for dysuria, frequency and urgency.   Musculoskeletal: Negative for back pain and myalgias.   Skin: Negative for itching and rash.   Neurological: Negative for dizziness, speech change, seizures, weakness and headaches.   Endo/Heme/Allergies: Does not bruise/bleed easily.   Psychiatric/Behavioral: Negative for depression. The patient is not nervous/anxious.                 Intake/Output - Last 3 Shifts        10/01 0700 - 10/02 0659 10/02 0700 - 10/03 0659 10/03 0700 - 10/04 0659     P.O.   960 820     Total Intake(mL/kg)   960 (11.3) 820 (9.9)     Net   +960 +820                 Urine Occurrence   2 x       Stool Occurrence   0 x 1 x                 Lines/Drains/Airways            Peripheral Intravenous Line                           Peripheral IV - Single Lumen Left Antecubital -- days           Peripheral IV - Single Lumen 10/02/17 1208 Left Hand less than 1 day                      Physical Exam   Constitutional: He is oriented to person, place, and time. He appears well-developed and well-nourished.   HENT:   Head: Normocephalic and atraumatic.   Eyes: EOM are normal. Pupils are equal, round, and reactive to light.   Neck: Normal range of motion. Neck supple.   Cardiovascular: Normal rate, regular rhythm and normal heart sounds.    Pulmonary/Chest: Effort normal and breath sounds normal.   Abdominal: Soft. Bowel sounds are normal.   Musculoskeletal: Normal range of motion.   Neurological: He is alert and oriented to person, place, and time.   Skin: Skin is warm and dry.   Psychiatric: He has a normal mood and affect. His behavior is normal. Thought content normal.   Nursing note and vitals reviewed.        Significant Labs:  BMP:   Recent Labs  Lab 10/02/17  0719         K 4.5      CO2 29   BUN 13   CREATININE 1.2   CALCIUM 9.8      CBC:   Recent Labs  Lab 10/02/17  0719   WBC 5.57   RBC 5.41   HGB 16.2   HCT 46.3      MCV 86   MCH 29.9   MCHC 35.0      CMP:   Recent Labs  Lab 10/02/17  0719      CALCIUM 9.8   ALBUMIN 4.2   PROT 7.6      K 4.5   CO2 29      BUN 13   CREATININE 1.2   ALKPHOS 69   ALT 26   AST 17   BILITOT 1.4*         Significant Diagnostics:     2D echo:  1 - Normal left ventricular systolic function (EF 60-65%).     2 - No wall motion abnormalities.     3 - Normal left ventricular diastolic function.     4 - Normal right ventricular systolic function .     5 - Mild mitral regurgitation.     6 - Mild tricuspid regurgitation.     7 - The estimated PA systolic pressure is 26 mmHg.         LHC:     - Left Main Coronary Artery:             The distal LM has a 70% stenosis. There is BENITA 3 flow.     - Left Anterior Descending Artery:             The mid LAD has a 80% stenosis. There is  EBNITA 3 flow.     - Left Circumflex Artery:             The LCX has luminal irregularities. There is BENITA 3 flow.     - Right Coronary Artery:             The proximal RCA has a 50% stenosis. There is BENITA 3 flow.     - Radial:             The radial was not studied.     - Ramus:             The proximal ramus has a 75% stenosis. There is BENITA 3 flow.     - OM2:             The proximal OM2 has a 50% stenosis. There is BENITA 3 flow.           Assessment/Plan:          * CAD (coronary artery disease), native coronary artery          - To OR today for CABG

## 2017-10-06 NOTE — ANESTHESIA PROCEDURE NOTES
Baseline BUBBA    Diagnosis: CAD  Patient location during procedure: OR  Procedure start time: 10/6/2017 8:01 AM  Timeout: 10/6/2017 8:00 AM  Procedure end time: 10/6/2017 8:30 AM  Surgery related to: CAD  Exam type: Baseline  Staffing  Anesthesiologist: DEMARCUS MCCONNELL  Other anesthesia staff: SERGIO VALE  Performed: anesthesiologist and other anesthesia staff   Preanesthetic Checklist  Completed: patient identified, surgical consent, pre-op evaluation, timeout performed, risks and benefits discussed, monitors and equipment checked, anesthesia consent given, oxygen available, suction available, hand hygiene performed and patient being monitored  Setup & Induction  Patient preparation: bite block inserted  Probe Insertion: easy  Exam: complete  Exam         LVAD:no  Estimated Ejection Fraction: > 55% normal  Regional Wall Abnormalities: no RWMA            Right Heart  Right Ventricle: normal  Right Ventricle Function: normal    Intra Atrial Septum  PFO: no shunt by color flow doppler  no IAS aneurysm  no lipomatous hypertrophy  no Atrial Septal Defect (Asd)    Right Ventricle  Size: normal    Aortic Valve:  Stenosis: none  Morphology: trileaflet  Regurgitation: no aortic valve regurgitation     Mitral Valve:  Morphology:normal  Jet Description: none    Tricuspid Valve:  Morphology: normal  Regurgitation: none    Pulmonic Valve:  Morphology:normal  Regurgitation(color flow): mild    Great Vessels  Ascending Aorta Atherosclerosis: 1=nl-min dz  Aortic Arch Atherosclerosis: 1=nl-min dz  IABP: no  Descending Aorta Atherosclerosis: 1=nl-min dz  Aorta    Descending aorta IABP: no    Effusions  Effusions: none    Summary  Findings discussed with surgeon.    Other Findings   Normal biventricular function  All valves structurally and functionally normal  No PFO

## 2017-10-07 LAB
ALLENS TEST: ABNORMAL
ANION GAP SERPL CALC-SCNC: 10 MMOL/L
ANION GAP SERPL CALC-SCNC: 10 MMOL/L
APTT BLDCRRT: 28.7 SEC
BASOPHILS # BLD AUTO: 0 K/UL
BASOPHILS # BLD AUTO: 0.01 K/UL
BASOPHILS NFR BLD: 0 %
BASOPHILS NFR BLD: 0.1 %
BUN SERPL-MCNC: 15 MG/DL
BUN SERPL-MCNC: 15 MG/DL
CALCIUM SERPL-MCNC: 8.4 MG/DL
CALCIUM SERPL-MCNC: 9.1 MG/DL
CHLORIDE SERPL-SCNC: 105 MMOL/L
CHLORIDE SERPL-SCNC: 109 MMOL/L
CO2 SERPL-SCNC: 22 MMOL/L
CO2 SERPL-SCNC: 26 MMOL/L
CREAT SERPL-MCNC: 1.2 MG/DL
CREAT SERPL-MCNC: 1.3 MG/DL
DELSYS: ABNORMAL
DIFFERENTIAL METHOD: ABNORMAL
DIFFERENTIAL METHOD: ABNORMAL
EOSINOPHIL # BLD AUTO: 0 K/UL
EOSINOPHIL # BLD AUTO: 0 K/UL
EOSINOPHIL NFR BLD: 0 %
EOSINOPHIL NFR BLD: 0.1 %
ERYTHROCYTE [DISTWIDTH] IN BLOOD BY AUTOMATED COUNT: 13.2 %
ERYTHROCYTE [DISTWIDTH] IN BLOOD BY AUTOMATED COUNT: 13.5 %
ERYTHROCYTE [SEDIMENTATION RATE] IN BLOOD BY WESTERGREN METHOD: 12 MM/H
EST. GFR  (AFRICAN AMERICAN): >60 ML/MIN/1.73 M^2
EST. GFR  (AFRICAN AMERICAN): >60 ML/MIN/1.73 M^2
EST. GFR  (NON AFRICAN AMERICAN): >60 ML/MIN/1.73 M^2
EST. GFR  (NON AFRICAN AMERICAN): >60 ML/MIN/1.73 M^2
FIBRINOGEN PPP-MCNC: 360 MG/DL
FIO2: 21
GLUCOSE SERPL-MCNC: 119 MG/DL
GLUCOSE SERPL-MCNC: 139 MG/DL
HCO3 UR-SCNC: 27.3 MMOL/L (ref 24–28)
HCT VFR BLD AUTO: 29.2 %
HCT VFR BLD AUTO: 35.9 %
HGB BLD-MCNC: 10.1 G/DL
HGB BLD-MCNC: 12.6 G/DL
INR PPP: 1.1
LDH SERPL L TO P-CCNC: 0.79 MMOL/L (ref 0.36–1.25)
LYMPHOCYTES # BLD AUTO: 0.8 K/UL
LYMPHOCYTES # BLD AUTO: 1.4 K/UL
LYMPHOCYTES NFR BLD: 11.7 %
LYMPHOCYTES NFR BLD: 6 %
MAGNESIUM SERPL-MCNC: 2 MG/DL
MAGNESIUM SERPL-MCNC: 2 MG/DL
MAGNESIUM SERPL-MCNC: 2.4 MG/DL
MCH RBC QN AUTO: 30.1 PG
MCH RBC QN AUTO: 30.8 PG
MCHC RBC AUTO-ENTMCNC: 34.6 G/DL
MCHC RBC AUTO-ENTMCNC: 35.1 G/DL
MCV RBC AUTO: 87 FL
MCV RBC AUTO: 88 FL
MODE: ABNORMAL
MONOCYTES # BLD AUTO: 1.3 K/UL
MONOCYTES # BLD AUTO: 1.6 K/UL
MONOCYTES NFR BLD: 10.7 %
MONOCYTES NFR BLD: 12.1 %
NEUTROPHILS # BLD AUTO: 10.8 K/UL
NEUTROPHILS # BLD AUTO: 9.4 K/UL
NEUTROPHILS NFR BLD: 77.3 %
NEUTROPHILS NFR BLD: 81.6 %
PCO2 BLDA: 42 MMHG (ref 35–45)
PH SMN: 7.42 [PH] (ref 7.35–7.45)
PHOSPHATE SERPL-MCNC: 2.6 MG/DL
PHOSPHATE SERPL-MCNC: 3.6 MG/DL
PLATELET # BLD AUTO: 148 K/UL
PLATELET # BLD AUTO: 175 K/UL
PMV BLD AUTO: 8.9 FL
PMV BLD AUTO: 9 FL
PO2 BLDA: 67 MMHG (ref 80–100)
POC BE: 3 MMOL/L
POC SATURATED O2: 93 % (ref 95–100)
POC TCO2: 29 MMOL/L (ref 23–27)
POCT GLUCOSE: 117 MG/DL (ref 70–110)
POCT GLUCOSE: 132 MG/DL (ref 70–110)
POCT GLUCOSE: 133 MG/DL (ref 70–110)
POCT GLUCOSE: 140 MG/DL (ref 70–110)
POCT GLUCOSE: 140 MG/DL (ref 70–110)
POCT GLUCOSE: 144 MG/DL (ref 70–110)
POCT GLUCOSE: 155 MG/DL (ref 70–110)
POCT GLUCOSE: 160 MG/DL (ref 70–110)
POCT GLUCOSE: 161 MG/DL (ref 70–110)
POCT GLUCOSE: 162 MG/DL (ref 70–110)
POCT GLUCOSE: 163 MG/DL (ref 70–110)
POCT GLUCOSE: 164 MG/DL (ref 70–110)
POCT GLUCOSE: 183 MG/DL (ref 70–110)
POCT GLUCOSE: 184 MG/DL (ref 70–110)
POTASSIUM SERPL-SCNC: 3.7 MMOL/L
POTASSIUM SERPL-SCNC: 4 MMOL/L
POTASSIUM SERPL-SCNC: 4.6 MMOL/L
PROTHROMBIN TIME: 11.9 SEC
RBC # BLD AUTO: 3.35 M/UL
RBC # BLD AUTO: 4.09 M/UL
SAMPLE: ABNORMAL
SITE: ABNORMAL
SODIUM SERPL-SCNC: 141 MMOL/L
SODIUM SERPL-SCNC: 141 MMOL/L
SP02: 99
WBC # BLD AUTO: 12.11 K/UL
WBC # BLD AUTO: 13.27 K/UL

## 2017-10-07 PROCEDURE — 25000003 PHARM REV CODE 250: Performed by: THORACIC SURGERY (CARDIOTHORACIC VASCULAR SURGERY)

## 2017-10-07 PROCEDURE — 84132 ASSAY OF SERUM POTASSIUM: CPT

## 2017-10-07 PROCEDURE — 83735 ASSAY OF MAGNESIUM: CPT

## 2017-10-07 PROCEDURE — 94799 UNLISTED PULMONARY SVC/PX: CPT

## 2017-10-07 PROCEDURE — 85730 THROMBOPLASTIN TIME PARTIAL: CPT

## 2017-10-07 PROCEDURE — 80048 BASIC METABOLIC PNL TOTAL CA: CPT | Mod: 91

## 2017-10-07 PROCEDURE — 25000242 PHARM REV CODE 250 ALT 637 W/ HCPCS: Performed by: THORACIC SURGERY (CARDIOTHORACIC VASCULAR SURGERY)

## 2017-10-07 PROCEDURE — P9045 ALBUMIN (HUMAN), 5%, 250 ML: HCPCS | Performed by: THORACIC SURGERY (CARDIOTHORACIC VASCULAR SURGERY)

## 2017-10-07 PROCEDURE — 25000003 PHARM REV CODE 250: Performed by: STUDENT IN AN ORGANIZED HEALTH CARE EDUCATION/TRAINING PROGRAM

## 2017-10-07 PROCEDURE — 27000221 HC OXYGEN, UP TO 24 HOURS

## 2017-10-07 PROCEDURE — 80048 BASIC METABOLIC PNL TOTAL CA: CPT

## 2017-10-07 PROCEDURE — 99231 SBSQ HOSP IP/OBS SF/LOW 25: CPT | Mod: ,,, | Performed by: ANESTHESIOLOGY

## 2017-10-07 PROCEDURE — 97802 MEDICAL NUTRITION INDIV IN: CPT | Performed by: DIETITIAN, REGISTERED

## 2017-10-07 PROCEDURE — 93010 ELECTROCARDIOGRAM REPORT: CPT | Mod: ,,, | Performed by: INTERNAL MEDICINE

## 2017-10-07 PROCEDURE — 83605 ASSAY OF LACTIC ACID: CPT

## 2017-10-07 PROCEDURE — P9045 ALBUMIN (HUMAN), 5%, 250 ML: HCPCS | Performed by: STUDENT IN AN ORGANIZED HEALTH CARE EDUCATION/TRAINING PROGRAM

## 2017-10-07 PROCEDURE — 82803 BLOOD GASES ANY COMBINATION: CPT

## 2017-10-07 PROCEDURE — 94761 N-INVAS EAR/PLS OXIMETRY MLT: CPT

## 2017-10-07 PROCEDURE — 84100 ASSAY OF PHOSPHORUS: CPT | Mod: 91

## 2017-10-07 PROCEDURE — 93005 ELECTROCARDIOGRAM TRACING: CPT

## 2017-10-07 PROCEDURE — S0028 INJECTION, FAMOTIDINE, 20 MG: HCPCS | Performed by: THORACIC SURGERY (CARDIOTHORACIC VASCULAR SURGERY)

## 2017-10-07 PROCEDURE — 85025 COMPLETE CBC W/AUTO DIFF WBC: CPT

## 2017-10-07 PROCEDURE — 94770 HC EXHALED C02 TEST: CPT

## 2017-10-07 PROCEDURE — 25000003 PHARM REV CODE 250

## 2017-10-07 PROCEDURE — 99900035 HC TECH TIME PER 15 MIN (STAT)

## 2017-10-07 PROCEDURE — 84100 ASSAY OF PHOSPHORUS: CPT

## 2017-10-07 PROCEDURE — 99232 SBSQ HOSP IP/OBS MODERATE 35: CPT | Mod: ,,, | Performed by: INTERNAL MEDICINE

## 2017-10-07 PROCEDURE — 85384 FIBRINOGEN ACTIVITY: CPT

## 2017-10-07 PROCEDURE — 20000000 HC ICU ROOM

## 2017-10-07 PROCEDURE — 83735 ASSAY OF MAGNESIUM: CPT | Mod: 91

## 2017-10-07 PROCEDURE — 37799 UNLISTED PX VASCULAR SURGERY: CPT

## 2017-10-07 PROCEDURE — 63600175 PHARM REV CODE 636 W HCPCS: Performed by: STUDENT IN AN ORGANIZED HEALTH CARE EDUCATION/TRAINING PROGRAM

## 2017-10-07 PROCEDURE — 94640 AIRWAY INHALATION TREATMENT: CPT

## 2017-10-07 PROCEDURE — A4216 STERILE WATER/SALINE, 10 ML: HCPCS | Performed by: THORACIC SURGERY (CARDIOTHORACIC VASCULAR SURGERY)

## 2017-10-07 PROCEDURE — 63600175 PHARM REV CODE 636 W HCPCS: Performed by: THORACIC SURGERY (CARDIOTHORACIC VASCULAR SURGERY)

## 2017-10-07 PROCEDURE — 85610 PROTHROMBIN TIME: CPT

## 2017-10-07 RX ORDER — GLUCAGON 1 MG
1 KIT INJECTION
Status: DISCONTINUED | OUTPATIENT
Start: 2017-10-07 | End: 2017-10-09

## 2017-10-07 RX ORDER — LIDOCAINE HYDROCHLORIDE 20 MG/ML
INJECTION, SOLUTION INFILTRATION; PERINEURAL
Status: COMPLETED
Start: 2017-10-07 | End: 2017-10-07

## 2017-10-07 RX ORDER — IBUPROFEN 200 MG
16 TABLET ORAL
Status: DISCONTINUED | OUTPATIENT
Start: 2017-10-07 | End: 2017-10-09

## 2017-10-07 RX ORDER — INSULIN ASPART 100 [IU]/ML
0-4 INJECTION, SOLUTION INTRAVENOUS; SUBCUTANEOUS
Status: DISCONTINUED | OUTPATIENT
Start: 2017-10-07 | End: 2017-10-09

## 2017-10-07 RX ORDER — OXYCODONE HYDROCHLORIDE 5 MG/1
5 TABLET ORAL EVERY 4 HOURS PRN
Status: DISCONTINUED | OUTPATIENT
Start: 2017-10-07 | End: 2017-10-10

## 2017-10-07 RX ORDER — ALBUMIN HUMAN 50 G/1000ML
25 SOLUTION INTRAVENOUS ONCE
Status: COMPLETED | OUTPATIENT
Start: 2017-10-07 | End: 2017-10-07

## 2017-10-07 RX ORDER — FUROSEMIDE 10 MG/ML
20 INJECTION INTRAMUSCULAR; INTRAVENOUS 2 TIMES DAILY
Status: DISCONTINUED | OUTPATIENT
Start: 2017-10-07 | End: 2017-10-08

## 2017-10-07 RX ORDER — OXYCODONE HYDROCHLORIDE 5 MG/1
10 TABLET ORAL EVERY 6 HOURS PRN
Status: DISCONTINUED | OUTPATIENT
Start: 2017-10-07 | End: 2017-10-10

## 2017-10-07 RX ORDER — CLOPIDOGREL BISULFATE 75 MG/1
75 TABLET ORAL DAILY
Status: DISCONTINUED | OUTPATIENT
Start: 2017-10-07 | End: 2017-10-08

## 2017-10-07 RX ORDER — METOPROLOL TARTRATE 1 MG/ML
5 INJECTION, SOLUTION INTRAVENOUS ONCE
Status: COMPLETED | OUTPATIENT
Start: 2017-10-07 | End: 2017-10-07

## 2017-10-07 RX ORDER — IBUPROFEN 200 MG
24 TABLET ORAL
Status: DISCONTINUED | OUTPATIENT
Start: 2017-10-07 | End: 2017-10-09

## 2017-10-07 RX ORDER — METOPROLOL TARTRATE 25 MG/1
25 TABLET, FILM COATED ORAL 2 TIMES DAILY
Status: DISCONTINUED | OUTPATIENT
Start: 2017-10-07 | End: 2017-10-07

## 2017-10-07 RX ORDER — FUROSEMIDE 10 MG/ML
20 INJECTION INTRAMUSCULAR; INTRAVENOUS ONCE
Status: COMPLETED | OUTPATIENT
Start: 2017-10-07 | End: 2017-10-07

## 2017-10-07 RX ORDER — METOPROLOL TARTRATE 50 MG/1
50 TABLET ORAL 2 TIMES DAILY
Status: DISCONTINUED | OUTPATIENT
Start: 2017-10-07 | End: 2017-10-09

## 2017-10-07 RX ORDER — METOPROLOL TARTRATE 1 MG/ML
INJECTION, SOLUTION INTRAVENOUS
Status: COMPLETED
Start: 2017-10-07 | End: 2017-10-07

## 2017-10-07 RX ADMIN — ALBUMIN (HUMAN) 25 G: 12.5 SOLUTION INTRAVENOUS at 04:10

## 2017-10-07 RX ADMIN — Medication 3 ML: at 02:10

## 2017-10-07 RX ADMIN — LIDOCAINE HYDROCHLORIDE 400 MG: 20 INJECTION, SOLUTION INFILTRATION; PERINEURAL at 06:10

## 2017-10-07 RX ADMIN — CLOPIDOGREL 75 MG: 75 TABLET, FILM COATED ORAL at 07:10

## 2017-10-07 RX ADMIN — IPRATROPIUM BROMIDE AND ALBUTEROL SULFATE 3 ML: .5; 3 SOLUTION RESPIRATORY (INHALATION) at 08:10

## 2017-10-07 RX ADMIN — METOPROLOL TARTRATE 50 MG: 50 TABLET, FILM COATED ORAL at 09:10

## 2017-10-07 RX ADMIN — ALBUMIN (HUMAN) 25 G: 12.5 SOLUTION INTRAVENOUS at 10:10

## 2017-10-07 RX ADMIN — FUROSEMIDE 20 MG: 10 INJECTION, SOLUTION INTRAMUSCULAR; INTRAVENOUS at 12:10

## 2017-10-07 RX ADMIN — METOPROLOL TARTRATE 5 MG: 5 INJECTION INTRAVENOUS at 05:10

## 2017-10-07 RX ADMIN — FUROSEMIDE 20 MG: 10 INJECTION, SOLUTION INTRAMUSCULAR; INTRAVENOUS at 06:10

## 2017-10-07 RX ADMIN — Medication 3 ML: at 06:10

## 2017-10-07 RX ADMIN — METOPROLOL TARTRATE 5 MG: 5 INJECTION INTRAVENOUS at 09:10

## 2017-10-07 RX ADMIN — CEFAZOLIN SODIUM 2 G: 2 SOLUTION INTRAVENOUS at 03:10

## 2017-10-07 RX ADMIN — CEFAZOLIN SODIUM 2 G: 2 SOLUTION INTRAVENOUS at 07:10

## 2017-10-07 RX ADMIN — OXYCODONE HYDROCHLORIDE 10 MG: 5 TABLET ORAL at 09:10

## 2017-10-07 RX ADMIN — ASPIRIN 81 MG: 81 TABLET, COATED ORAL at 09:10

## 2017-10-07 RX ADMIN — MUPIROCIN 1 G: 20 OINTMENT TOPICAL at 09:10

## 2017-10-07 RX ADMIN — Medication 3 ML: at 10:10

## 2017-10-07 RX ADMIN — IPRATROPIUM BROMIDE AND ALBUTEROL SULFATE 3 ML: .5; 3 SOLUTION RESPIRATORY (INHALATION) at 12:10

## 2017-10-07 RX ADMIN — FAMOTIDINE 20 MG: 10 INJECTION, SOLUTION INTRAVENOUS at 09:10

## 2017-10-07 RX ADMIN — IPRATROPIUM BROMIDE AND ALBUTEROL SULFATE 3 ML: .5; 3 SOLUTION RESPIRATORY (INHALATION) at 03:10

## 2017-10-07 RX ADMIN — CEFAZOLIN SODIUM 2 G: 2 SOLUTION INTRAVENOUS at 12:10

## 2017-10-07 RX ADMIN — METOPROLOL TARTRATE 5 MG: 1 INJECTION, SOLUTION INTRAVENOUS at 09:10

## 2017-10-07 RX ADMIN — POLYETHYLENE GLYCOL 3350 17 G: 17 POWDER, FOR SOLUTION ORAL at 09:10

## 2017-10-07 RX ADMIN — POTASSIUM CHLORIDE 20 MEQ: 200 INJECTION, SOLUTION INTRAVENOUS at 09:10

## 2017-10-07 RX ADMIN — METOPROLOL TARTRATE 25 MG: 25 TABLET ORAL at 09:10

## 2017-10-07 NOTE — ASSESSMENT & PLAN NOTE
BG goal 110-140, bg at goal  Change to transition insulin gtt at 1.6 u/hr with low dose insulin correction. Will evaluation for prandial bg excursions once diet advanced  bg monitoring q4hs    Discharge rec: FRANCIA

## 2017-10-07 NOTE — SUBJECTIVE & OBJECTIVE
"Interval HPI:   Overnight events: insulin requirements stable overnight  Eating:   NPO  Nausea: No  Hypoglycemia and intervention: No  Fever: No  TPN and/or TF: No  If yes, type of TF/TPN and rate: n/a    /60   Pulse 109   Temp 98.8 °F (37.1 °C) (Oral)   Resp 18   Ht 5' 9" (1.753 m)   Wt 81.6 kg (180 lb)   SpO2 97%   BMI 26.58 kg/m²     Labs Reviewed and Include      Recent Labs  Lab 10/07/17  0300   *   CALCIUM 8.4*      K 4.6   CO2 22*      BUN 15   CREATININE 1.2     Lab Results   Component Value Date    WBC 13.27 (H) 10/07/2017    HGB 12.6 (L) 10/07/2017    HCT 35.9 (L) 10/07/2017    MCV 88 10/07/2017     10/07/2017     No results for input(s): TSH, FREET4 in the last 168 hours.  Lab Results   Component Value Date    HGBA1C 5.3 10/06/2017       Nutritional status:   Body mass index is 26.58 kg/m².  Lab Results   Component Value Date    ALBUMIN 4.0 10/06/2017    ALBUMIN 4.0 10/05/2017    ALBUMIN 4.5 10/04/2017     No results found for: PREALBUMIN    Estimated Creatinine Clearance: 75.3 mL/min (based on SCr of 1.2 mg/dL).    Accu-Checks  Recent Labs      10/06/17   1606  10/06/17   1701  10/06/17   1756  10/06/17   2013  10/06/17   2116  10/06/17   2211  10/07/17   0010  10/07/17   0222  10/07/17   0516  10/07/17   0608   POCTGLUCOSE  162*  184*  164*  161*  183*  163*  160*  144*  117*  133*       Current Medications and/or Treatments Impacting Glycemic Control  Immunotherapy:  Immunosuppressants     None        Steroids:   Hormones     None        Pressors:    Autonomic Drugs     None        Hyperglycemia/Diabetes Medications: Antihyperglycemics     Start     Stop Route Frequency Ordered    10/07/17 0930  insulin regular (Humulin R) 100 Units in sodium chloride 0.9% 100 mL infusion      -- IV Continuous 10/07/17 0823    10/07/17 0922  insulin aspart pen 0-4 Units      -- SubQ As needed (PRN) 10/07/17 0823        "

## 2017-10-07 NOTE — NURSING
Pt's tachycardia increased to high 110s. CVP 3 with frequent fluctuations in SBP (80s-120s)  via art line. Dr. Navarrete notified. 500 ml albumin ordered. Will continue to monitor.

## 2017-10-07 NOTE — PROGRESS NOTES
"[facility]  Endocrinology  Progress Note    Admit Date: 10/2/2017     Reason for Consult: Management of  Hyperglycemia     Surgical Procedure and Date: CABG x3 on 10/6/17      HPI:   Patient is a 47 y.o. male with a diagnosis of HLD, HTN, schatzki ring s/p dilation, acute angina during treadmill stress test with subsequent left heart cath identifying significant CAD. Now s/p CABG x3. No previous history of diabetes. Denies family history of diabetes.   Endocrine consulted for postoperative BG management.            Interval HPI:   Overnight events: insulin requirements stable overnight  Eating:   NPO  Nausea: No  Hypoglycemia and intervention: No  Fever: No  TPN and/or TF: No  If yes, type of TF/TPN and rate: n/a    /60   Pulse 109   Temp 98.8 °F (37.1 °C) (Oral)   Resp 18   Ht 5' 9" (1.753 m)   Wt 81.6 kg (180 lb)   SpO2 97%   BMI 26.58 kg/m²       Labs Reviewed and Include      Recent Labs  Lab 10/07/17  0300   *   CALCIUM 8.4*      K 4.6   CO2 22*      BUN 15   CREATININE 1.2     Lab Results   Component Value Date    WBC 13.27 (H) 10/07/2017    HGB 12.6 (L) 10/07/2017    HCT 35.9 (L) 10/07/2017    MCV 88 10/07/2017     10/07/2017     No results for input(s): TSH, FREET4 in the last 168 hours.  Lab Results   Component Value Date    HGBA1C 5.3 10/06/2017       Nutritional status:   Body mass index is 26.58 kg/m².  Lab Results   Component Value Date    ALBUMIN 4.0 10/06/2017    ALBUMIN 4.0 10/05/2017    ALBUMIN 4.5 10/04/2017     No results found for: PREALBUMIN    Estimated Creatinine Clearance: 75.3 mL/min (based on SCr of 1.2 mg/dL).    Accu-Checks  Recent Labs      10/06/17   1606  10/06/17   1701  10/06/17   1756  10/06/17   2013  10/06/17   2116  10/06/17   2211  10/07/17   0010  10/07/17   0222  10/07/17   0516  10/07/17   0608   POCTGLUCOSE  162*  184*  164*  161*  183*  163*  160*  144*  117*  133*       Current Medications and/or Treatments " Impacting Glycemic Control  Immunotherapy:  Immunosuppressants     None        Steroids:   Hormones     None        Pressors:    Autonomic Drugs     None        Hyperglycemia/Diabetes Medications: Antihyperglycemics     Start     Stop Route Frequency Ordered    10/07/17 0930  insulin regular (Humulin R) 100 Units in sodium chloride 0.9% 100 mL infusion      -- IV Continuous 10/07/17 0823    10/07/17 0922  insulin aspart pen 0-4 Units      -- SubQ As needed (PRN) 10/07/17 0823          ASSESSMENT and PLAN    * CAD (coronary artery disease), native coronary artery    S/p CABG. Avoid hypoglycemia.           Stress hyperglycemia    BG goal 110-140, bg at goal  Change to transition insulin gtt at 1.8 u/hr with low dose insulin correction. Will evaluation for prandial bg excursions once diet advanced  bg monitoring q4hs    Discharge rec: TBD           S/P CABG x 3    Per CTS.   Avoid hypoglycemia.               Zachary Cornell MD  Endocrinology  [facility]

## 2017-10-07 NOTE — PLAN OF CARE
Problem: Patient Care Overview  Goal: Plan of Care Review  Recommendations     Recommendation/Intervention: As medically able, advance diet to clears and then as tolerated to cardiac diet. RD to provide cardiac diet education at follow up once diet is advanced. RD following.      Goals: Diet advancement within 48 hours  Nutrition Goal Status: new  Communication of RD Recs: reviewed with RN     Reason for Assessment     Reason for Assessment: physician consult  Diagnosis: cardiac disease (s/p CABG )  Relevent Medical History: CAD, HLD, HTN, schatziring s/p dilation   Interdisciplinary Rounds: did not attend

## 2017-10-07 NOTE — NURSING
Pt stable overnight. Sats > 95% on 1L nasal cannula. HR sinus tach in the 100s-110s mainly. Pt states that pain is well controlled with PCA dilaudid pump. Urine output  ml/hr. Chest output sanguineous ~ 200 for each pleuravac overnight. Dressings dry and intact. Pt motivated to recover from surgery and does incentive spirometer multiple times an hour while awake. OOBTC this AM with 2 person assist (mainly for line control and safety). Brief OH while sitting on the edge of the bed before standing but quickly resolved after deep breathing. Pt's wife updated via telephone on pt's new room number and stable status. Will continue to monitor.

## 2017-10-07 NOTE — ASSESSMENT & PLAN NOTE
Nutrition Diagnosis:  Increased nutrient needs    Related to (etiology):   Protein for healing    Signs and Symptoms (as evidenced by):   S/p CABG x 3    Interventions/Recommendations (treatment strategy):  See rd recs above.    Nutrition Diagnosis Status:   New

## 2017-10-07 NOTE — PROGRESS NOTES
"Progress Note  Surgical Intensive Care    Admit Date: 10/2/2017  Post-operative Day: 1 Day Post-Op  Hospital Day: 6    SUBJECTIVE:     Follow-up For:  Procedure(s) (LRB):  AORTOCORONARY BYPASS-CABG/CABGX3 (N/A)  HARVEST-VEIN-ENDOVASCULAR (Left)    HPI:    Mr Fausto Patterson is a 47 y.o. 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 Callahan, for 1-2 months of intermittent bilateral chest pain with moderate to strenuous exercise while doing an intense workout regimen, the P90X program. The chest pain was always alleviated by rest. He had a prior episode of chest pain with exertion and vertigo for which he went to the ED, had an EKG, and was d/c-ed home. Today, on the treadmill test, he reached goal HR and BP and started having chest pain after 9 minutes, with ST depressions in inferolateral leads for 15-18 mins, and chest pain resolved after 18 minutes of rest. No nitro given. 2D Echo with CFD showed a normal heart with LVEF 65, normal diastolic function, mildly elevated PA pressure of 26 mmHg.        He went emergently to cath lab, found to have multivessel CAD so no PCI done await CTS consult. No complaints on admit. Never had chest pain at rest, only with exertion. No palpitations, syncope, f/c, or other sx.      Presents to SICU today after 3 vessel CABG. Currently hemodynamically stable on non rebreather mask. Pleura vac x 2 with minimal output since pt has been on floor. Management per CTS team.   Interval history:      Pt's tachycardia increased to high 110s overnight. CVP 3 with frequent fluctuations in SBP (80s-120s)  via art line. 500 ml albumin ordered. Will continue to monitor. No other acute events " overnight.        Continuous Infusions:   HYDROmorphone PCA 0.5 mg/mL in 30mL 0.9% Sodium Chloride in a 35mL MONOject Detach Syringe      insulin (HUMAN R) infusion (adults) 2.2 Units/hr (10/07/17 0500)    nicardipine Stopped (10/06/17 1545)     Scheduled Meds:   albuterol-ipratropium 2.5mg-0.5mg/3mL  3 mL Nebulization Q4H    aspirin  81 mg Oral Daily    ceFAZolin (ANCEF) IVPB  2 g Intravenous Q8H    clopidogrel  75 mg Oral Once    famotidine (PF)  20 mg Intravenous BID    metoprolol tartrate  25 mg Oral Once    mupirocin  1 g Nasal BID    mupirocin   Nasal Once    polyethylene glycol  17 g Oral Daily    sodium chloride 0.9%  3 mL Intravenous Q8H     PRN Meds:albumin human 5%, dextrose 50%, dextrose 50%, lactated ringers, lactulose, magnesium sulfate IVPB, metoclopramide HCl, naloxone, ondansetron, potassium chloride **AND** potassium chloride **AND** potassium chloride    Review of patient's allergies indicates:   Allergen Reactions    Clarithromycin Other (See Comments)     hiccups    Levaquin [levofloxacin] Hives    Naldecon-ex Hives       OBJECTIVE:     Vital Signs (Most Recent)  Temp: 98.8 °F (37.1 °C) (10/07/17 0300)  Pulse: 110 (10/07/17 0355)  Resp: (!) 22 (10/07/17 0355)  BP: 99/60 (10/07/17 0300)  SpO2: 98 % (10/07/17 0355)    Vital Signs Range (Last 24H):  Temp:  [97.7 °F (36.5 °C)-98.8 °F (37.1 °C)]   Pulse:  []   Resp:  [11-41]   BP: ()/(55-87)   SpO2:  [97 %-100 %]   Arterial Line BP: ()/(49-86)     I & O (Last 24H):  Intake/Output Summary (Last 24 hours) at 10/07/17 0535  Last data filed at 10/07/17 0515   Gross per 24 hour   Intake           4094.5 ml   Output             3121 ml   Net            973.5 ml     Ventilator Data (Last 24H):          Hemodynamic Parameters (Last 24H):       Physical Exam:  Gen:  No acute distress  CV:  Peripherally well-perfused.  Pulses 2+ bilaterally. RRR  Lungs:  Normal respiratory effort. Lungs clear to auscultation.   Abdomen:  Soft,  non-tender, non-distended  Head/Neck:  Normocephalic.  Atraumatic. No TTP, AROM and PROM intact without pain  Neuro:  CN intact without deficit, SILT throughout B/L Upper & Lower Extremities  Pelvis: No TTP, Stable to direct anterior pressure over ASIS.       Wound/Incision:  clean, dry, intact    Laboratory (Last 24H):  CBC:    Recent Labs  Lab 10/07/17  0300   WBC 13.27*   HGB 12.6*   HCT 35.9*        CMP:    Recent Labs  Lab 10/07/17  0300   CALCIUM 8.4*      K 4.6   CO2 22*      BUN 15   CREATININE 1.2       Chest X-Ray: X-ray Chest Ap Portable    Result Date: 10/6/2017  Satisfactory postoperative chest radiograph with sternal sutures well aligned. Electronically signed by: Karolina Quick MD Date:     10/06/17 Time:    15:34           ASSESSMENT/PLAN:         Fausto Patterson is a 47 y.o. male s/p 3 vessel CABG yesterday with a stable postoperative course. Will continue to monitor in SICU this morning with probable step down later today.         Plan:     Neuro:   - Pain Control per primary      Pulmonary:   - Extubated on non re breather saturating well.         Recent Labs  Lab 10/06/17  1501   PH 7.295*   PCO2 52.2*   PO2 75*   HCO3 25.4   POCSATURATED 93*   BE -1      - Chest tubes to suction wit (20cc output since being on floor)     Cardiac:  -MAP goal >65  - Management per CTS team      Renal:   -Mcneal in place   - Continue to follow UOP     Fluids/Electrolytes/Nutrition/GI:   -replace lytes PRN  -maintenance fluids   - Strict I/O  -NGT/G tube output     Hematology/Oncology:  -H/H stable, continue to follow  -INR/Plts     Infectious Disease:   -Afebrile  -WBC wnl      Endocrine:  -Glucose goal of 120-150  -SSI     Dispo:  -Likely step down to floor today     Juan M Kumar M.D. PGY1  SICU

## 2017-10-07 NOTE — PROCEDURES
"Fausto Patterson is a 47 y.o. male patient.    Temp: 98.7 °F (37.1 °C) (10/07/17 1500)  Pulse: (!) 114 (10/07/17 1500)  Resp: 20 (10/07/17 1500)  BP: 110/63 (10/07/17 1500)  SpO2: 99 % (10/07/17 1500)  Weight: 81.6 kg (180 lb) (10/06/17 0550)  Height: 5' 9" (175.3 cm) (10/06/17 0550)  Mallampati Scale: Class II  ASA Classification: Class 2    Arterial Line  Date/Time: 10/7/2017 6:11 PM  Location procedure was performed: [facility] SURGICAL ICU (SICU)  Performed by: RAMBO CAMILO  Authorized by: RAMBO CAMILO   Pre-op Diagnosis: CAD  Post-operative diagnosis: CAD  Consent Done: Yes  Consent: Written consent obtained.  Risks and benefits: risks, benefits and alternatives were discussed  Consent given by: patient  Patient identity confirmed: MRN and name  Preparation: Patient was prepped and draped in the usual sterile fashion.  Indications: multiple ABGs and hemodynamic monitoring  Location: left radial (Right radial previously attempted )  Anesthesia: local infiltration    Anesthesia:  Local Anesthetic: lidocaine 2% without epinephrine  Anesthetic total: 2 mL  Description of findings: Ultrasound guided    Needle gauge: 22  Seldinger technique: Seldinger technique used  Number of attempts: 1  Complications: No  Estimated blood loss (mL): 2  Post-procedure: line sutured  Patient tolerance: Patient tolerated the procedure well with no immediate complications          Rambo Camilo  10/7/2017  "

## 2017-10-07 NOTE — CONSULTS
"  [facility]  Adult Nutrition  Consult Note    SUMMARY     Recommendations    Recommendation/Intervention: As medically able, advance diet to clears and then as tolerated to cardiac diet. RD to provide cardiac diet education at follow up once diet is advanced. RD following.     Goals: Diet advancement within 48 hours  Nutrition Goal Status: new  Communication of RD Recs: reviewed with RN    Reason for Assessment    Reason for Assessment: physician consult  Diagnosis: cardiac disease (s/p CABG )  Relevent Medical History: CAD, HLD, HTN, schatziring s/p dilation   Interdisciplinary Rounds: did not attend     General Information Comments: Pt extubated, in chair at visit, currently NPO but per MD will try clears later today, no weight loss and fine oral intake PTA    Nutrition Discharge Planning: Thong ODONNELL nutrition, RD to provide cardiac diet education at follow up    Nutrition Prescription Ordered    Current Diet Order: NPO     Evaluation of Received Nutrients/Fluid Intake          % Intake of Estimated Energy Needs: 0 - 25 %  % Meal Intake: NPO     Nutrition Risk Screen     Nutrition Risk Screen: no indicators present    Nutrition/Diet History    Patient Reported Diet/Restrictions/Preferences: general  Typical Food/Fluid Intake: RACHEAL  Food Preferences: No Zoroastrian or cultural food preferences identified           Labs/Tests/Procedures/Meds       Pertinent Labs Reviewed: reviewed  Pertinent Labs Comments: glu 139, Ca 8.4  Pertinent Medications Reviewed: reviewed  Pertinent Medications Comments: metoprolol    Physical Findings    Overall Physical Appearance: nourished     Oral/Mouth Cavity: WDL  Skin: incision (chest)    Anthropometrics    Temp: 98.8 °F (37.1 °C)     Height: 5' 9" (175.3 cm)  Weight Method: Stated  Weight: 81.6 kg (180 lb)  Ideal Body Weight (IBW), Male: 160 lb     % Ideal Body Weight, Male (lb): 112.5 lb     BMI (Calculated): 26.6  BMI Grade: 25 - 29.9 - overweight            " Estimated/Assessed Needs    Weight Used For Calorie Calculations: 81.6 kg (179 lb 14.3 oz)   Height (cm): 175.3 cm  Energy Calorie Requirements (kcal): 2095  Energy Need Method: [place]-St Jeor (1.25 PAL)        RMR ([place]-St. Jeor Equation): 1676.38        Weight Used For Protein Calculations: 81.6 kg (179 lb 14.3 oz)  Protein Requirements: 90-106g (1.1-1.3g)     Fluid Need Method: RDA Method        RDA Method (mL): 2095               Assessment and Plan    S/P CABG x 3    Nutrition Diagnosis:  Increased nutrient needs    Related to (etiology):   Protein for healing    Signs and Symptoms (as evidenced by):   S/p CABG x 3    Interventions/Recommendations (treatment strategy):  See rd recs above.    Nutrition Diagnosis Status:   New              Monitor and Evaluation    Food and Nutrient Intake: energy intake, food and beverage intake  Food and Nutrient Adminstration: diet order  Knowledge/Beliefs/Attitudes: food and nutrition knowledge/skill  Physical Activity and Function: nutrition-related ADLs and IADLs  Anthropometric Measurements: weight, weight change, body mass index  Biochemical Data, Medical Tests and Procedures: electrolyte and renal panel, gastrointestinal profile, glucose/endocrine profile, inflammatory profile, lipid profile  Nutrition-Focused Physical Findings: overall appearance    Nutrition Risk    Level of Risk:  (2x/week)    Nutrition Follow-Up

## 2017-10-08 LAB
ANION GAP SERPL CALC-SCNC: 9 MMOL/L
BASOPHILS # BLD AUTO: 0.01 K/UL
BASOPHILS NFR BLD: 0.1 %
BUN SERPL-MCNC: 15 MG/DL
CALCIUM SERPL-MCNC: 8.7 MG/DL
CHLORIDE SERPL-SCNC: 106 MMOL/L
CO2 SERPL-SCNC: 24 MMOL/L
CREAT SERPL-MCNC: 1 MG/DL
DIFFERENTIAL METHOD: ABNORMAL
EOSINOPHIL # BLD AUTO: 0 K/UL
EOSINOPHIL NFR BLD: 0.2 %
ERYTHROCYTE [DISTWIDTH] IN BLOOD BY AUTOMATED COUNT: 13.5 %
EST. GFR  (AFRICAN AMERICAN): >60 ML/MIN/1.73 M^2
EST. GFR  (NON AFRICAN AMERICAN): >60 ML/MIN/1.73 M^2
GLUCOSE SERPL-MCNC: 109 MG/DL
HCT VFR BLD AUTO: 28.5 %
HGB BLD-MCNC: 9.7 G/DL
LYMPHOCYTES # BLD AUTO: 1 K/UL
LYMPHOCYTES NFR BLD: 9 %
MAGNESIUM SERPL-MCNC: 1.9 MG/DL
MAGNESIUM SERPL-MCNC: 1.9 MG/DL
MAGNESIUM SERPL-MCNC: 2.2 MG/DL
MAGNESIUM SERPL-MCNC: 2.3 MG/DL
MAGNESIUM SERPL-MCNC: 2.4 MG/DL
MAGNESIUM SERPL-MCNC: 2.6 MG/DL
MCH RBC QN AUTO: 30.3 PG
MCHC RBC AUTO-ENTMCNC: 34 G/DL
MCV RBC AUTO: 89 FL
MONOCYTES # BLD AUTO: 1.1 K/UL
MONOCYTES NFR BLD: 10 %
NEUTROPHILS # BLD AUTO: 8.8 K/UL
NEUTROPHILS NFR BLD: 80.4 %
PLATELET # BLD AUTO: 158 K/UL
PMV BLD AUTO: 8.7 FL
POCT GLUCOSE: 105 MG/DL (ref 70–110)
POCT GLUCOSE: 115 MG/DL (ref 70–110)
POCT GLUCOSE: 117 MG/DL (ref 70–110)
POCT GLUCOSE: 117 MG/DL (ref 70–110)
POCT GLUCOSE: 120 MG/DL (ref 70–110)
POCT GLUCOSE: 121 MG/DL (ref 70–110)
POCT GLUCOSE: 127 MG/DL (ref 70–110)
POCT GLUCOSE: 143 MG/DL (ref 70–110)
POTASSIUM SERPL-SCNC: 3.6 MMOL/L
POTASSIUM SERPL-SCNC: 3.9 MMOL/L
POTASSIUM SERPL-SCNC: 3.9 MMOL/L
POTASSIUM SERPL-SCNC: 4 MMOL/L
POTASSIUM SERPL-SCNC: 4.1 MMOL/L
POTASSIUM SERPL-SCNC: 4.1 MMOL/L
POTASSIUM SERPL-SCNC: 4.4 MMOL/L
RBC # BLD AUTO: 3.2 M/UL
SODIUM SERPL-SCNC: 139 MMOL/L
WBC # BLD AUTO: 10.99 K/UL

## 2017-10-08 PROCEDURE — 83735 ASSAY OF MAGNESIUM: CPT

## 2017-10-08 PROCEDURE — 84132 ASSAY OF SERUM POTASSIUM: CPT | Mod: 91

## 2017-10-08 PROCEDURE — 63600175 PHARM REV CODE 636 W HCPCS: Performed by: THORACIC SURGERY (CARDIOTHORACIC VASCULAR SURGERY)

## 2017-10-08 PROCEDURE — 97165 OT EVAL LOW COMPLEX 30 MIN: CPT

## 2017-10-08 PROCEDURE — 63600175 PHARM REV CODE 636 W HCPCS: Performed by: INTERNAL MEDICINE

## 2017-10-08 PROCEDURE — S0028 INJECTION, FAMOTIDINE, 20 MG: HCPCS | Performed by: THORACIC SURGERY (CARDIOTHORACIC VASCULAR SURGERY)

## 2017-10-08 PROCEDURE — 85025 COMPLETE CBC W/AUTO DIFF WBC: CPT

## 2017-10-08 PROCEDURE — 83735 ASSAY OF MAGNESIUM: CPT | Mod: 91

## 2017-10-08 PROCEDURE — 99232 SBSQ HOSP IP/OBS MODERATE 35: CPT | Mod: ,,, | Performed by: INTERNAL MEDICINE

## 2017-10-08 PROCEDURE — 25000003 PHARM REV CODE 250: Performed by: STUDENT IN AN ORGANIZED HEALTH CARE EDUCATION/TRAINING PROGRAM

## 2017-10-08 PROCEDURE — 80048 BASIC METABOLIC PNL TOTAL CA: CPT

## 2017-10-08 PROCEDURE — A4216 STERILE WATER/SALINE, 10 ML: HCPCS | Performed by: THORACIC SURGERY (CARDIOTHORACIC VASCULAR SURGERY)

## 2017-10-08 PROCEDURE — 25000003 PHARM REV CODE 250: Performed by: THORACIC SURGERY (CARDIOTHORACIC VASCULAR SURGERY)

## 2017-10-08 PROCEDURE — 63600175 PHARM REV CODE 636 W HCPCS: Performed by: STUDENT IN AN ORGANIZED HEALTH CARE EDUCATION/TRAINING PROGRAM

## 2017-10-08 PROCEDURE — 84132 ASSAY OF SERUM POTASSIUM: CPT

## 2017-10-08 PROCEDURE — 99231 SBSQ HOSP IP/OBS SF/LOW 25: CPT | Mod: ,,, | Performed by: ANESTHESIOLOGY

## 2017-10-08 PROCEDURE — 20000000 HC ICU ROOM

## 2017-10-08 PROCEDURE — 97161 PT EVAL LOW COMPLEX 20 MIN: CPT

## 2017-10-08 PROCEDURE — 97530 THERAPEUTIC ACTIVITIES: CPT

## 2017-10-08 PROCEDURE — 99900035 HC TECH TIME PER 15 MIN (STAT)

## 2017-10-08 PROCEDURE — 25000003 PHARM REV CODE 250: Performed by: INTERNAL MEDICINE

## 2017-10-08 RX ORDER — FUROSEMIDE 10 MG/ML
40 INJECTION INTRAMUSCULAR; INTRAVENOUS 2 TIMES DAILY
Status: DISCONTINUED | OUTPATIENT
Start: 2017-10-08 | End: 2017-10-11

## 2017-10-08 RX ADMIN — MAGNESIUM SULFATE IN WATER 2 G: 40 INJECTION, SOLUTION INTRAVENOUS at 04:10

## 2017-10-08 RX ADMIN — OXYCODONE HYDROCHLORIDE 5 MG: 5 TABLET ORAL at 05:10

## 2017-10-08 RX ADMIN — FUROSEMIDE 40 MG: 10 INJECTION, SOLUTION INTRAVENOUS at 05:10

## 2017-10-08 RX ADMIN — POTASSIUM CHLORIDE 20 MEQ: 200 INJECTION, SOLUTION INTRAVENOUS at 01:10

## 2017-10-08 RX ADMIN — OXYCODONE HYDROCHLORIDE 10 MG: 5 TABLET ORAL at 03:10

## 2017-10-08 RX ADMIN — FAMOTIDINE 20 MG: 10 INJECTION, SOLUTION INTRAVENOUS at 09:10

## 2017-10-08 RX ADMIN — FUROSEMIDE 40 MG: 10 INJECTION, SOLUTION INTRAVENOUS at 09:10

## 2017-10-08 RX ADMIN — Medication 3 ML: at 06:10

## 2017-10-08 RX ADMIN — POLYETHYLENE GLYCOL 3350 17 G: 17 POWDER, FOR SOLUTION ORAL at 09:10

## 2017-10-08 RX ADMIN — SODIUM CHLORIDE 0.6 UNITS/HR: 9 INJECTION, SOLUTION INTRAVENOUS at 04:10

## 2017-10-08 RX ADMIN — MUPIROCIN 1 G: 20 OINTMENT TOPICAL at 09:10

## 2017-10-08 RX ADMIN — MAGNESIUM SULFATE IN WATER 2 G: 40 INJECTION, SOLUTION INTRAVENOUS at 02:10

## 2017-10-08 RX ADMIN — TICAGRELOR 90 MG: 90 TABLET ORAL at 05:10

## 2017-10-08 RX ADMIN — METOPROLOL TARTRATE 50 MG: 50 TABLET, FILM COATED ORAL at 09:10

## 2017-10-08 RX ADMIN — Medication 3 ML: at 10:10

## 2017-10-08 RX ADMIN — ASPIRIN 81 MG: 81 TABLET, COATED ORAL at 09:10

## 2017-10-08 RX ADMIN — Medication 3 ML: at 02:10

## 2017-10-08 RX ADMIN — POTASSIUM CHLORIDE 20 MEQ: 200 INJECTION, SOLUTION INTRAVENOUS at 10:10

## 2017-10-08 RX ADMIN — POTASSIUM CHLORIDE 20 MEQ: 200 INJECTION, SOLUTION INTRAVENOUS at 04:10

## 2017-10-08 NOTE — SUBJECTIVE & OBJECTIVE
"Interval HPI:   Overnight events: decreased insulin requirements over the past day  Eating:   <25%  Nausea: No  Hypoglycemia and intervention: No  Fever: No  TPN and/or TF: No  If yes, type of TF/TPN and rate: n/a    /68   Pulse (!) 117   Temp 99.5 °F (37.5 °C) (Oral)   Resp (!) 32   Ht 5' 9" (1.753 m)   Wt 81.6 kg (180 lb)   SpO2 95%   BMI 26.58 kg/m²     Labs Reviewed and Include      Recent Labs  Lab 10/08/17  0547 10/08/17  0917     --    CALCIUM 8.7  --      --    K 4.1 4.1   CO2 24  --      --    BUN 15  --    CREATININE 1.0  --      Lab Results   Component Value Date    WBC 10.99 10/08/2017    HGB 9.7 (L) 10/08/2017    HCT 28.5 (L) 10/08/2017    MCV 89 10/08/2017     10/08/2017     No results for input(s): TSH, FREET4 in the last 168 hours.  Lab Results   Component Value Date    HGBA1C 5.3 10/06/2017       Nutritional status:   Body mass index is 26.58 kg/m².  Lab Results   Component Value Date    ALBUMIN 4.0 10/06/2017    ALBUMIN 4.0 10/05/2017    ALBUMIN 4.5 10/04/2017     No results found for: PREALBUMIN    Estimated Creatinine Clearance: 90.3 mL/min (based on SCr of 1 mg/dL).    Accu-Checks  Recent Labs      10/07/17   0516  10/07/17   0608  10/07/17   0818  10/07/17   0939  10/07/17   1154  10/07/17   1528  10/07/17   1816  10/07/17   2001  10/08/17   0011  10/08/17   0440   POCTGLUCOSE  117*  133*  140*  140*  132*  120*  127*  115*  117*  117*       Current Medications and/or Treatments Impacting Glycemic Control  Immunotherapy:  Immunosuppressants     None        Steroids:   Hormones     None        Pressors:    Autonomic Drugs     None        Hyperglycemia/Diabetes Medications: Antihyperglycemics     Start     Stop Route Frequency Ordered    10/07/17 0930  insulin regular (Humulin R) 100 Units in sodium chloride 0.9% 100 mL infusion      -- IV Continuous 10/07/17 0823    10/07/17 0922  insulin aspart pen 0-4 Units      -- SubQ As needed (PRN) 10/07/17 0823    "

## 2017-10-08 NOTE — PROGRESS NOTES
"Progress Note  Surgical Intensive Care    Admit Date: 10/2/2017  Post-operative Day: 2 Days Post-Op  Hospital Day: 7    SUBJECTIVE:     Follow-up For:  Procedure(s) (LRB):  AORTOCORONARY BYPASS-CABG/CABGX3 (N/A)  HARVEST-VEIN-ENDOVASCULAR (Left)    HPI:    Mr Fausto Patterson is a 47 y.o. 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 Callahan, for 1-2 months of intermittent bilateral chest pain with moderate to strenuous exercise while doing an intense workout regimen, the P90X program. The chest pain was always alleviated by rest. He had a prior episode of chest pain with exertion and vertigo for which he went to the ED, had an EKG, and was d/c-ed home. Today, on the treadmill test, he reached goal HR and BP and started having chest pain after 9 minutes, with ST depressions in inferolateral leads for 15-18 mins, and chest pain resolved after 18 minutes of rest. No nitro given. 2D Echo with CFD showed a normal heart with LVEF 65, normal diastolic function, mildly elevated PA pressure of 26 mmHg.        He went emergently to cath lab, found to have multivessel CAD so no PCI done await CTS consult. No complaints on admit. Never had chest pain at rest, only with exertion. No palpitations, syncope, f/c, or other sx.      Presents to SICU today after 3 vessel CABG. Currently hemodynamically stable on non rebreather mask. Pleura vac x 2 with minimal output since pt has been on floor. Management per CTS team.   Interval history:      NAEON. Patient doing well this am. Tolerating PO intake       Continuous Infusions:   insulin (HUMAN R) infusion (adults) 1.5 Units/hr (10/08/17 0600)    nicardipine Stopped (10/06/17 " 6100)     Scheduled Meds:   aspirin  81 mg Oral Daily    famotidine (PF)  20 mg Intravenous BID    furosemide  40 mg Intravenous BID    metoprolol tartrate  25 mg Oral Once    metoprolol tartrate  50 mg Oral BID    mupirocin  1 g Nasal BID    mupirocin   Nasal Once    polyethylene glycol  17 g Oral Daily    sodium chloride 0.9%  3 mL Intravenous Q8H    ticagrelor  90 mg Oral BID     PRN Meds:albumin human 5%, dextrose 50%, dextrose 50%, glucagon (human recombinant), glucose, glucose, insulin aspart, lactated ringers, lactulose, magnesium sulfate IVPB, metoclopramide HCl, ondansetron, oxycodone, oxycodone, potassium chloride **AND** potassium chloride **AND** potassium chloride    Review of patient's allergies indicates:   Allergen Reactions    Clarithromycin Other (See Comments)     hiccups    Levaquin [levofloxacin] Hives    Naldecon-ex Hives       OBJECTIVE:     Vital Signs (Most Recent)  Temp: 99.5 °F (37.5 °C) (10/08/17 0400)  Pulse: (!) 112 (10/08/17 0700)  Resp: (!) 32 (10/08/17 0500)  BP: 109/64 (10/08/17 0700)  SpO2: (!) 94 % (10/08/17 0700)    Vital Signs Range (Last 24H):  Temp:  [97.7 °F (36.5 °C)-99.5 °F (37.5 °C)]   Pulse:  [101-118]   Resp:  [11-32]   BP: ()/(53-72)   SpO2:  [93 %-99 %]   Arterial Line BP: (106-136)/(54-64)     I & O (Last 24H):    Intake/Output Summary (Last 24 hours) at 10/08/17 1129  Last data filed at 10/08/17 1100   Gross per 24 hour   Intake             1225 ml   Output             2520 ml   Net            -1295 ml     Ventilator Data (Last 24H):          Hemodynamic Parameters (Last 24H):       Physical Exam:  Gen:  No acute distress  CV:  Peripherally well-perfused.  Pulses 2+ bilaterally. RRR  Lungs:  Normal respiratory effort. Lungs clear to auscultation.   Abdomen:  Soft, non-tender, non-distended  Head/Neck:  Normocephalic.  Atraumatic. No TTP, AROM and PROM intact without pain  Neuro:  CN intact without deficit, SILT throughout B/L Upper & Lower  Extremities  Pelvis: No TTP, Stable to direct anterior pressure over ASIS.       Wound/Incision:  clean, dry, intact    Laboratory (Last 24H):  CBC:    Recent Labs  Lab 10/08/17  0546   WBC 10.99   HGB 9.7*   HCT 28.5*        CMP:    Recent Labs  Lab 10/08/17  0547 10/08/17  0917   CALCIUM 8.7  --      --    K 4.1 4.1   CO2 24  --      --    BUN 15  --    CREATININE 1.0  --        Chest X-Ray: X-ray Chest Ap Portable    Result Date: 10/6/2017  Satisfactory postoperative chest radiograph with sternal sutures well aligned. Electronically signed by: Karolina Quick MD Date:     10/06/17 Time:    15:34           ASSESSMENT/PLAN:         Fausto Patterson is a 47 y.o. male s/p 3 vessel CABG yesterday with a stable postoperative course. Will continue to monitor in SICU this morning with probable step down later today.         Plan:     Neuro:   - Pain Control per primary      Pulmonary:   - Extubated on non re breather saturating well.         Recent Labs  Lab 10/06/17  1501   PH 7.295*   PCO2 52.2*   PO2 75*   HCO3 25.4   POCSATURATED 93*   BE -1      - Chest tubes to suction wit (20cc output since being on floor)     Cardiac:  -MAP goal >65  - Management per CTS team      Renal:   -Mcneal in place   - Continue to follow UOP     Fluids/Electrolytes/Nutrition/GI:   -replace lytes PRN  -maintenance fluids   - Strict I/O  -NGT/G tube output     Hematology/Oncology:  -H/H stable, continue to follow  -INR/Plts     Infectious Disease:   -Afebrile  -WBC wnl      Endocrine:  -Glucose goal of 120-150  -SSI     Dispo:  -Likely step down to floor today     Alfredo Montoya M.D. PGY3  SICU

## 2017-10-08 NOTE — SUBJECTIVE & OBJECTIVE
Interval History: NAEON.  Sinus tachycardia.  CT output decreasing.  Pain controlled.  Good UOP    Medications:  Continuous Infusions:   insulin (HUMAN R) infusion (adults) 1.5 Units/hr (10/08/17 0600)    nicardipine Stopped (10/06/17 1545)     Scheduled Meds:   aspirin  81 mg Oral Daily    clopidogrel  75 mg Oral Daily    famotidine (PF)  20 mg Intravenous BID    furosemide  20 mg Intravenous BID    metoprolol tartrate  25 mg Oral Once    metoprolol tartrate  50 mg Oral BID    mupirocin  1 g Nasal BID    mupirocin   Nasal Once    polyethylene glycol  17 g Oral Daily    sodium chloride 0.9%  3 mL Intravenous Q8H     PRN Meds:albumin human 5%, dextrose 50%, dextrose 50%, glucagon (human recombinant), glucose, glucose, insulin aspart, lactated ringers, lactulose, magnesium sulfate IVPB, metoclopramide HCl, ondansetron, oxycodone, oxycodone, potassium chloride **AND** potassium chloride **AND** potassium chloride     Objective:     Vital Signs (Most Recent):  Temp: 99.5 °F (37.5 °C) (10/08/17 0400)  Pulse: (!) 117 (10/08/17 0600)  Resp: (!) 32 (10/08/17 0500)  BP: 113/68 (10/08/17 0600)  SpO2: 95 % (10/08/17 0600) Vital Signs (24h Range):  Temp:  [97.7 °F (36.5 °C)-99.5 °F (37.5 °C)] 99.5 °F (37.5 °C)  Pulse:  [] 117  Resp:  [11-32] 32  SpO2:  [93 %-99 %] 95 %  BP: ()/(53-72) 113/68  Arterial Line BP: (106-136)/(54-64) 123/60     Weight: 81.6 kg (180 lb)  Body mass index is 26.58 kg/m².    SpO2: 95 %  O2 Device (Oxygen Therapy): room air    Intake/Output - Last 3 Shifts       10/06 0700 - 10/07 0659 10/07 0700 - 10/08 0659 10/08 0700 - 10/09 0659    P.O. 120 360     I.V. (mL/kg) 3474.5 (42.6) 865 (10.6)     Blood 500      Total Intake(mL/kg) 4094.5 (50.2) 1225 (15)     Urine (mL/kg/hr) 2180 (1.1) 1915 (1)     Stool       Chest Tube 941 (0.5) 365 (0.2)     Total Output 3121 2280      Net +973.5 -1055                   Lines/Drains/Airways     Central Venous Catheter Line                  Percutaneous Central Line Insertion/Assessment - quad lumen  10/06/17 0716 right internal jugular;other (see comments) 2 days          Drain                 Urethral Catheter 10/06/17 0723 Straight-tip;Temperature probe;Non-latex 16 Fr. 2 days         Y Chest Tube 1 and 2 10/06/17 1347 1 Right Mediastinal 32 Fr. 2 Left Mediastinal 32 Fr. 1 day         Y Chest Tube 3 and 4 10/06/17 1349 3 Right Pleural 32 Fr. 4 Left Pleural 32 Fr. 1 day          Arterial Line                 Arterial Line 10/07/17 1800 Left less than 1 day          Line                 Pacer Wires 10/06/17 1346 1 day          Peripheral Intravenous Line                 Peripheral IV - Single Lumen 10/06/17 0742 Right Forearm 2 days                Physical Exam   Constitutional: He is oriented to person, place, and time. He appears well-developed and well-nourished. No distress.   Cardiovascular: Normal rate and intact distal pulses.    Pulmonary/Chest: Effort normal and breath sounds normal. No respiratory distress.   Abdominal: Soft. He exhibits no distension.   Musculoskeletal: He exhibits no edema.   Neurological: He is alert and oriented to person, place, and time.   Psychiatric: He has a normal mood and affect.       Significant Labs:  ABGs:   Recent Labs  Lab 10/07/17  1909   PH 7.421   PCO2 42.0   PO2 67*   HCO3 27.3   POCSATURATED 93*   BE 3     CBC:   Recent Labs  Lab 10/08/17  0546   WBC 10.99   RBC 3.20*   HGB 9.7*   HCT 28.5*      MCV 89   MCH 30.3   MCHC 34.0     CMP:   Recent Labs  Lab 10/08/17  0547      CALCIUM 8.7      K 4.1   CO2 24      BUN 15   CREATININE 1.0     Coagulation:   Recent Labs  Lab 10/07/17  0300   INR 1.1   APTT 28.7       Significant Diagnostics:  Reviewed

## 2017-10-08 NOTE — ASSESSMENT & PLAN NOTE
BG goal 110-140 first 3 post op days then 140-180. Change to low dose correction scale, AC/HS BG monitoring. Anticipating resolution, sign off in the next 24 hours.     Discharge recommendations  Anticipating resolution

## 2017-10-08 NOTE — PT/OT/SLP EVAL
Physical Therapy  Evaluation    Fausto Patterson   MRN: [id number]   Admitting Diagnosis: CAD (coronary artery disease), native coronary artery    PT Received On: 10/08/17  PT Start Time: 0846     PT Stop Time: 0901    PT Total Time (min): 15 min       Billable Minutes:  Evaluation 15 (co-eval with OT)    Diagnosis: CAD (coronary artery disease), native coronary artery   PTCA 10/2  S/p CABGx3 10/6    Past Medical History:   Diagnosis Date    Hyperlipidemia     Kidney stone     Schatzki's ring     Unspecified disorder of kidney and ureter     calcium oxalate nephrolithiasis      History reviewed. No pertinent surgical history.    Referring physician: Ever Mendoza MD  Date referred to PT: 10/6/17    General Precautions: Standard, fall, sternal  Orthopedic Precautions: N/A   Braces: N/A       Do you have any cultural, spiritual, Confucianism conflicts, given your current situation?: none noted     Patient History:  Lives With: spouse  Living Arrangements: house  Home Accessibility: stairs to enter home, stairs within home  Home Layout: Able to live on 1st floor  Number of Stairs to Enter Home: 2  Number of Stairs Within Home:  (1 flight)  Stair Railings at Home: none  Transportation Available: family or friend will provide, car  Living Environment Comment: Pt lives with his wife in a 2SH with 2 DANIEL.; pt able to live on 1st floor. Pt reports that PTA he was (I) with ambulation and ADLs, driving, and working full-time as a nuclear pharmacist. Pt reports his wife is able to assist upon d/c.   Equipment Currently Used at Home: none  DME owned (not currently used): none    Previous Level of Function:  Ambulation Skills: independent  Transfer Skills: independent  ADL Skills: independent  Work/Leisure Activity: independent    Subjective:  Communicated with RN prior to session.  Pt agreeable to therapy.   Chief Complaint: increased work of breathing; chest pain with deep breathing   Patient goals: return home      Pain/Comfort  Pain Rating 1:  (did not rate)  Location - Side 1: Bilateral  Location - Orientation 1: midline  Location 1: chest (with deep breathing)  Pain Addressed 1: Reposition, Distraction, Pre-medicate for activity      Objective:   Patient found with: telemetry, pulse ox (continuous), blood pressure cuff, chest tube, central line, fletcher catheter, peripheral IV, arterial line     Cognitive Exam:  Oriented to: Person, Place, Time and Situation    Follows Commands/attention: Follows two-step commands  Communication: clear/fluent  Safety awareness/insight to disability: intact    Physical Exam:  Postural examination/scapula alignment: Rounded shoulder    Skin integrity: Visible skin intact  Edema: Mild BUE    Sensation:   Decreased in hands since surgery    Lower Extremity Range of Motion:  Right Lower Extremity: WFL  Left Lower Extremity: WFL    Lower Extremity Strength:  Right Lower Extremity: WFL  Left Lower Extremity: WFL    Functional Mobility:  Bed Mobility:  Supine to Sit:  (not performed 2* pt [facility] before and after treatment session )    Transfers:  Sit <> Stand Assistance: Contact Guard Assistance  Sit <> Stand Assistive Device: No Assistive Device    Gait:   Gait Distance: ~25 ft. within room   Assistance 1: Contact Guard Assistance  Gait Assistive Device: No device  Gait Pattern: 2-point gait  Gait Deviation(s): decreased александр, decreased weight-shifting ability, decreased step length    -Pt declining ambulation in hallway 2* increased WOB; ambulation in room limited by lines    Balance:   Static Stand: SBA  Dynamic stand: CGA    Therapeutic Activities and Exercises:  Pt educated on role of PT and PT POC. Pt verbalized understanding.   Pt educated on sternal precautions. Pt required min v/c to maintain throughout session.     AM-PAC 6 CLICK MOBILITY  How much help from another person does this patient currently need?   1 = Unable, Total/Dependent Assistance  2 = A lot, Maximum/Moderate  Assistance  3 = A little, Minimum/Contact Guard/Supervision  4 = None, Modified [place]/Independent    Turning over in bed (including adjusting bedclothes, sheets and blankets)?: 3  Sitting down on and standing up from a chair with arms (e.g., wheelchair, bedside commode, etc.): 3  Moving from lying on back to sitting on the side of the bed?: 3  Moving to and from a bed to a chair (including a wheelchair)?: 3  Need to walk in hospital room?: 3  Climbing 3-5 steps with a railing?: 2  Total Score: 17     AM-PAC Raw Score CMS G-Code Modifier Level of Impairment Assistance   6 % Total / Unable   7 - 9 CM 80 - 100% Maximal Assist   10 - 14 CL 60 - 80% Moderate Assist   15 - 19 CK 40 - 60% Moderate Assist   20 - 22 CJ 20 - 40% Minimal Assist   23 CI 1-20% SBA / CGA   24 CH 0% Independent/ Mod I     Patient left up in chair with all lines intact and call button in reach.    Assessment:   Fausto Patterson is a 47 y.o. male with a medical diagnosis of CAD (coronary artery disease), native coronary artery and presents s/p CABG 10/6. Pt was able to perform transfers and ambulation with CGA for safety and stability. Pt with c/o increased WOB during session, limiting further ambulation and progression of mobility. Pt would benefit from skilled acute PT in order to address current deficits and progress functional mobility. Anticipate that pt will be able to return home with no further therapy needs when medically appropriate.     Rehab identified problem list/impairments: Rehab identified problem list/impairments: weakness, impaired functional mobilty, impaired endurance, gait instability, pain, impaired cardiopulmonary response to activity, impaired balance    Rehab potential is good.    Activity tolerance: Good    Discharge recommendations: Discharge Facility/Level Of Care Needs: home     Barriers to discharge: Barriers to Discharge: None    Equipment recommendations: Equipment Needed After Discharge: none     GOALS:     Physical Therapy Goals        Problem: Physical Therapy Goal    Goal Priority Disciplines Outcome Goal Variances Interventions   Physical Therapy Goal     PT/OT, PT Ongoing (interventions implemented as appropriate)     Description:  Goals to be met by: 10/18/17     Patient will increase functional independence with mobility by performin. Supine to sit with Supervision   2. Sit to stand transfer with Supervision  3. Gait  x 400 feet with Supervision.   4. Ascend/descend 2 stair without Handrails Stand-by Assistance.   5. Lower extremity exercise program x15 reps, with supervision, in order to increase LE strength and (I) with functional mobility.                       PLAN:    Patient to be seen 4 x/week to address the above listed problems via gait training, therapeutic activities, therapeutic exercises  Plan of Care expires: 17  Plan of Care reviewed with: patient        Marni Aleksandr, PT, DPT   10/8/2017  [phone]

## 2017-10-08 NOTE — ASSESSMENT & PLAN NOTE
BG goal 110-140, decreased insulin requirements over the past day  Continue transition insulin gtt at 0.8 u/hr with low dose insulin correction. Will evaluation for prandial bg excursions once diet advanced  bg monitoring ac/hs/2a    Discharge rec: anticipate resolution of insulin requirements prior to discharge

## 2017-10-08 NOTE — PLAN OF CARE
Problem: Occupational Therapy Goal  Goal: Occupational Therapy Goal  Outcome: Outcome(s) achieved Date Met: 10/08/17  OT Eval and Dc. Pt able to perform ADL/Self care while maintaining sternal precautions. Will defer to PT to address mobility. No further need for skilled OT services at this time

## 2017-10-08 NOTE — ASSESSMENT & PLAN NOTE
- POD 3 CABG  - PRN pain meds  - Wean supplemental O2  - Will d/c mediastinal tubes  - Lasix 40 BID  - Metop 75 BID  - Step down to CTSU

## 2017-10-08 NOTE — PT/OT/SLP EVAL
"Occupational Therapy  Evaluation/ Discharge     Fausto Patterson   MRN: [id number]   Admitting Diagnosis: CAD (coronary artery disease), native coronary artery    OT Date of Treatment: 10/08/17   OT Start Time: 0830  OT Stop Time: 0855  OT Total Time (min): 25 min    Billable Minutes:  Evaluation 10  Therapeutic Activity 15    Diagnosis: CAD (coronary artery disease), native coronary artery     Past Medical History:   Diagnosis Date    Hyperlipidemia     Kidney stone     Schatzki's ring     Unspecified disorder of kidney and ureter     calcium oxalate nephrolithiasis      History reviewed. No pertinent surgical history.    Referring physician: Reji BARBA)   Date referred to OT: 10/06/2017    General Precautions: Standard, fall, sternal  Patient History:  Living Environment  Lives With: spouse  Living Arrangements: house  Home Accessibility: stairs to enter home  Home Layout: Able to live on 1st floor  Number of Stairs to Enter Home: 2  Number of Stairs Within Home:  (1 flight- bed room and bathroom downstairs )  Transportation Available: car, family or friend will provide  Living Environment Comment: Pt lives with spouse in a 2 SH with 2 DANIEL. Pt able to live on 1st floor. Pt indep with ADL/Self care and mobility prior to admission. Pt's wife able to assist upon d/c. 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  Chief Complaint: sternal incision site pain   Patient/Family stated goals: "To get better and go home" Pt agreeable to OT evaluation     Pain/Comfort  Pain Rating 1: 4/10 (sternal incision pain )    Objective:  Patient found reclined in bed with: " arterial line, central line, blood pressure cuff, chest tube, fletcher catheter, telemetry, pulse ox (continuous), peripheral IV    Cognitive Exam:  Oriented to: Person, Place, Time and Situation  Follows Commands/attention: Follows multistep  commands  Communication: clear/fluent  Memory:  No Deficits noted  Safety awareness/insight to disability: intact  Coping skills/emotional control: Appropriate to situation    Visual/perceptual:  Intact  tracking    Physical Exam:  Postural examination/scapula alignment: No postural abnormalities identified  Skin integrity: Visible skin intact  Edema: Mild L hand swelling     Sensation:   Intact    Upper Extremity Range of Motion:  Right Upper Extremity: WFL  Left Upper Extremity: WFL    Upper Extremity Strength:  Right Upper Extremity: WFL  Left Upper Extremity: WFL   Strength: Bilateral  4+/5     Fine motor coordination:   Intact  Left hand, finger to nose, Right hand, finger to nose, Left hand thumb/finger opposition skills, Right hand thumb/finger opposition skills, Left hand, manipulation of objects and Right hand, manipulation of objects    Gross motor coordination: WFL    Functional Mobility:  Bed Mobility:  Not assessed this visit. Pt up in chair     Transfers:  Sit <> Stand Assistance: Contact Guard Assistance  Sit <> Stand Assistive Device: No Assistive Device  Bed <> Chair Technique: Stand Pivot  Bed <> Chair Transfer Assistance: Supervision  Bed <> Chair Assistive Device: No Assistive Device    Functional Ambulation: Pt walked a short distance in room (~ 8 feet) with CGA      Activities of Daily Living:    Grooming Position: Standing  Grooming Level of Assistance: Supervision          Balance:   Static Sit: GOOD: Takes MODERATE challenges from all directions  Dynamic Sit: GOOD: Maintains balance through MODERATE excursions of active trunk movement  Static Stand: GOOD: Takes MODERATE challenges from all directions  Dynamic stand: GOOD: Needs SUPERVISION only  "during gait and able to self right with moderate     Therapeutic Activities and Exercises:  Bed mobility, sit to stand and bed to chair transfer training while incorporating sternal precautions. Pt able to verbalize and incorporate sternal precautions with mobility and ADL/ Self care tasks  Pt educated on role of OT, plan of care, safety awareness, DME, importance of out of bed activity, energy conservation techniques, sternal precautions     AM-PAC 6 CLICK ADL  How much help from another person does this patient currently need?  1 = Unable, Total/Dependent Assistance  2 = A lot, Maximum/Moderate Assistance  3 = A little, Minimum/Contact Guard/Supervision  4 = None, Modified Boyle/Independent    Putting on and taking off regular lower body clothing? : 4  Bathing (including washing, rinsing, drying)?: 3  Toileting, which includes using toilet, bedpan, or urinal? : 4  Putting on and taking off regular upper body clothing?: 4  Taking care of personal grooming such as brushing teeth?: 4  Eating meals?: 4  Total Score: 23    AM-PAC Raw Score CMS "G-Code Modifier Level of Impairment Assistance   6 % Total / Unable   7 - 9 CM 80 - 100% Maximal Assist   10-14 CL 60 - 80% Moderate Assist   15 - 19 CK 40 - 60% Moderate Assist   20 - 22 CJ 20 - 40% Minimal Assist   23 CI 1-20% SBA / CGA   24 CH 0% Independent/ Mod I       Patient left up in chair with all lines intact, call button in reach and rn\ notified    Assessment:  Fausto Patterson is a 47 y.o. male with a medical diagnosis of CAD (coronary artery disease), native coronary artery and presents with good activity tolerance this visit. Pt is currently performing ADL/Self care while incorporating sternal precautions. No further need for skilled OT services at this time. Will defer further intervention to PT to address bed mobility and functional ambulation.     Rehab identified problem list/impairments: Rehab identified problem list/impairments: weakness, " impaired functional mobilty, impaired cardiopulmonary response to activity    Rehab potential is good.    Activity tolerance: Good    Discharge recommendations: Discharge Facility/Level Of Care Needs: home     Barriers to discharge: Barriers to Discharge: None    Equipment recommendations: none     GOALS:    Occupational Therapy Goals     Not on file          Multidisciplinary Problems (Resolved)        Problem: Occupational Therapy Goal    Goal Priority Disciplines Outcome Interventions   Occupational Therapy Goal   (Resolved)     OT, PT/OT Outcome(s) achieved                    PLAN:  OT eval and DC 10/8/2017   Plan of Care reviewed with: patient         Hind S SHRADDHA Cordova  10/08/2017

## 2017-10-08 NOTE — PLAN OF CARE
Problem: Patient Care Overview  Goal: Plan of Care Review  Outcome: Ongoing (interventions implemented as appropriate)  AAOx4. Follows commands. Afebrile, O2 sats  throughout shift. Tachycardic t/o shift. UOP adequate. PCA discontinued. Insulin drip @1.5, Pt and spouse updated on POC throughout shift. Will continue to monitor very closely. See flowsheet for full assessments.

## 2017-10-08 NOTE — PLAN OF CARE
Problem: Patient Care Overview  Goal: Plan of Care Review  Dr. Mejia and Dr. Chamberlain at the bedside to discuss the plan of care in regards to possible transfer to CTSU stepdown unit, initiation of Lasix IV 40 mg BID, PT, OT evaluation and treatment, and removal of pleural chest tubes.  All questions and concerns were answered and addressed.  Both verbalized understanding.

## 2017-10-08 NOTE — PLAN OF CARE
Problem: Physical Therapy Goal  Goal: Physical Therapy Goal  Goals to be met by: 10/18/17     Patient will increase functional independence with mobility by performin. Supine to sit with Supervision   2. Sit to stand transfer with Supervision  3. Gait  x 400 feet with Supervision.   4. Ascend/descend 2 stair without Handrails Stand-by Assistance.   5. Lower extremity exercise program x15 reps, with supervision, in order to increase LE strength and (I) with functional mobility.     Outcome: Ongoing (interventions implemented as appropriate)  PT evaluation complete and appropriate goals established.    Marni Brandon, PT, DPT   10/8/2017  [phone]

## 2017-10-08 NOTE — PROGRESS NOTES
"[facility]  Endocrinology  Progress Note    Admit Date: 10/2/2017     Reason for Consult: Management of  Hyperglycemia     Surgical Procedure and Date: CABG x3 on 10/6/17      HPI:   Patient is a 47 y.o. male with a diagnosis of HLD, HTN, schatzki ring s/p dilation, acute angina during treadmill stress test with subsequent left heart cath identifying significant CAD. Now s/p CABG x3. No previous history of diabetes. Denies family history of diabetes.   Endocrine consulted for postoperative BG management.            Interval HPI:   Overnight events: decreased insulin requirements over the past day  Eating:   <25%  Nausea: No  Hypoglycemia and intervention: No  Fever: No  TPN and/or TF: No  If yes, type of TF/TPN and rate: n/a    /68   Pulse (!) 117   Temp 99.5 °F (37.5 °C) (Oral)   Resp (!) 32   Ht 5' 9" (1.753 m)   Wt 81.6 kg (180 lb)   SpO2 95%   BMI 26.58 kg/m²       Labs Reviewed and Include      Recent Labs  Lab 10/08/17  0547 10/08/17  0917     --    CALCIUM 8.7  --      --    K 4.1 4.1   CO2 24  --      --    BUN 15  --    CREATININE 1.0  --      Lab Results   Component Value Date    WBC 10.99 10/08/2017    HGB 9.7 (L) 10/08/2017    HCT 28.5 (L) 10/08/2017    MCV 89 10/08/2017     10/08/2017     No results for input(s): TSH, FREET4 in the last 168 hours.  Lab Results   Component Value Date    HGBA1C 5.3 10/06/2017       Nutritional status:   Body mass index is 26.58 kg/m².  Lab Results   Component Value Date    ALBUMIN 4.0 10/06/2017    ALBUMIN 4.0 10/05/2017    ALBUMIN 4.5 10/04/2017     No results found for: PREALBUMIN    Estimated Creatinine Clearance: 90.3 mL/min (based on SCr of 1 mg/dL).    Accu-Checks  Recent Labs      10/07/17   0516  10/07/17   0608  10/07/17   0818  10/07/17   0939  10/07/17   1154  10/07/17   1528  10/07/17   1816  10/07/17   2001  10/08/17   0011  10/08/17   0440   POCTGLUCOSE  117*  133*  140*  140*  132*  120*  127*  115* "  117*  117*       Current Medications and/or Treatments Impacting Glycemic Control  Immunotherapy:  Immunosuppressants     None        Steroids:   Hormones     None        Pressors:    Autonomic Drugs     None        Hyperglycemia/Diabetes Medications: Antihyperglycemics     Start     Stop Route Frequency Ordered    10/07/17 0930  insulin regular (Humulin R) 100 Units in sodium chloride 0.9% 100 mL infusion      -- IV Continuous 10/07/17 0823    10/07/17 0922  insulin aspart pen 0-4 Units      -- SubQ As needed (PRN) 10/07/17 0823          ASSESSMENT and PLAN    * CAD (coronary artery disease), native coronary artery    S/p CABG. Avoid hypoglycemia.           Stress hyperglycemia    BG goal 110-140, decreased insulin requirements over the past day  Continue transition insulin gtt at 0.8 u/hr with low dose insulin correction. Will evaluate for prandial bg excursions with diet advancement  bg monitoring ac/hs/2a    Discharge rec: anticipate resolution of insulin requirements prior to discharge          S/P CABG x 3    Per CTS.   Avoid hypoglycemia.               Zachary Cornell MD  Endocrinology  [facility]

## 2017-10-09 LAB
ANION GAP SERPL CALC-SCNC: 11 MMOL/L
BASOPHILS # BLD AUTO: 0.02 K/UL
BASOPHILS NFR BLD: 0.2 %
BUN SERPL-MCNC: 16 MG/DL
CALCIUM SERPL-MCNC: 8.8 MG/DL
CHLORIDE SERPL-SCNC: 101 MMOL/L
CO2 SERPL-SCNC: 23 MMOL/L
CREAT SERPL-MCNC: 1 MG/DL
DIFFERENTIAL METHOD: ABNORMAL
EOSINOPHIL # BLD AUTO: 0.1 K/UL
EOSINOPHIL NFR BLD: 0.9 %
ERYTHROCYTE [DISTWIDTH] IN BLOOD BY AUTOMATED COUNT: 13.1 %
EST. GFR  (AFRICAN AMERICAN): >60 ML/MIN/1.73 M^2
EST. GFR  (NON AFRICAN AMERICAN): >60 ML/MIN/1.73 M^2
GLUCOSE SERPL-MCNC: 97 MG/DL
HCT VFR BLD AUTO: 27.3 %
HGB BLD-MCNC: 9.4 G/DL
LYMPHOCYTES # BLD AUTO: 1.4 K/UL
LYMPHOCYTES NFR BLD: 15.7 %
MAGNESIUM SERPL-MCNC: 1.7 MG/DL
MAGNESIUM SERPL-MCNC: 2 MG/DL
MAGNESIUM SERPL-MCNC: 2.1 MG/DL
MAGNESIUM SERPL-MCNC: 2.2 MG/DL
MAGNESIUM SERPL-MCNC: 2.3 MG/DL
MAGNESIUM SERPL-MCNC: 2.6 MG/DL
MCH RBC QN AUTO: 29.7 PG
MCHC RBC AUTO-ENTMCNC: 34.4 G/DL
MCV RBC AUTO: 86 FL
MONOCYTES # BLD AUTO: 1 K/UL
MONOCYTES NFR BLD: 11.9 %
NEUTROPHILS # BLD AUTO: 6.2 K/UL
NEUTROPHILS NFR BLD: 71.2 %
PHOSPHATE SERPL-MCNC: 1.7 MG/DL
PLATELET # BLD AUTO: 181 K/UL
PMV BLD AUTO: 9.3 FL
POCT GLUCOSE: 105 MG/DL (ref 70–110)
POCT GLUCOSE: 107 MG/DL (ref 70–110)
POCT GLUCOSE: 84 MG/DL (ref 70–110)
POCT GLUCOSE: 95 MG/DL (ref 70–110)
POCT GLUCOSE: 96 MG/DL (ref 70–110)
POTASSIUM SERPL-SCNC: 3.7 MMOL/L
POTASSIUM SERPL-SCNC: 3.9 MMOL/L
POTASSIUM SERPL-SCNC: 4 MMOL/L
POTASSIUM SERPL-SCNC: 4 MMOL/L
POTASSIUM SERPL-SCNC: 4.1 MMOL/L
POTASSIUM SERPL-SCNC: 4.1 MMOL/L
POTASSIUM SERPL-SCNC: 4.3 MMOL/L
RBC # BLD AUTO: 3.16 M/UL
SODIUM SERPL-SCNC: 135 MMOL/L
WBC # BLD AUTO: 8.67 K/UL

## 2017-10-09 PROCEDURE — 25000003 PHARM REV CODE 250: Performed by: STUDENT IN AN ORGANIZED HEALTH CARE EDUCATION/TRAINING PROGRAM

## 2017-10-09 PROCEDURE — 63600175 PHARM REV CODE 636 W HCPCS: Performed by: STUDENT IN AN ORGANIZED HEALTH CARE EDUCATION/TRAINING PROGRAM

## 2017-10-09 PROCEDURE — 25000003 PHARM REV CODE 250: Performed by: THORACIC SURGERY (CARDIOTHORACIC VASCULAR SURGERY)

## 2017-10-09 PROCEDURE — 83735 ASSAY OF MAGNESIUM: CPT | Mod: 91

## 2017-10-09 PROCEDURE — 99232 SBSQ HOSP IP/OBS MODERATE 35: CPT | Mod: ,,, | Performed by: NURSE PRACTITIONER

## 2017-10-09 PROCEDURE — 80048 BASIC METABOLIC PNL TOTAL CA: CPT

## 2017-10-09 PROCEDURE — 63600175 PHARM REV CODE 636 W HCPCS: Performed by: THORACIC SURGERY (CARDIOTHORACIC VASCULAR SURGERY)

## 2017-10-09 PROCEDURE — 20000000 HC ICU ROOM

## 2017-10-09 PROCEDURE — 84100 ASSAY OF PHOSPHORUS: CPT

## 2017-10-09 PROCEDURE — A4216 STERILE WATER/SALINE, 10 ML: HCPCS | Performed by: THORACIC SURGERY (CARDIOTHORACIC VASCULAR SURGERY)

## 2017-10-09 PROCEDURE — 84132 ASSAY OF SERUM POTASSIUM: CPT

## 2017-10-09 PROCEDURE — 84132 ASSAY OF SERUM POTASSIUM: CPT | Mod: 91

## 2017-10-09 PROCEDURE — 97803 MED NUTRITION INDIV SUBSEQ: CPT

## 2017-10-09 PROCEDURE — 85025 COMPLETE CBC W/AUTO DIFF WBC: CPT

## 2017-10-09 PROCEDURE — S0028 INJECTION, FAMOTIDINE, 20 MG: HCPCS | Performed by: THORACIC SURGERY (CARDIOTHORACIC VASCULAR SURGERY)

## 2017-10-09 PROCEDURE — 99233 SBSQ HOSP IP/OBS HIGH 50: CPT | Mod: ,,, | Performed by: SURGERY

## 2017-10-09 RX ORDER — IBUPROFEN 200 MG
16 TABLET ORAL
Status: DISCONTINUED | OUTPATIENT
Start: 2017-10-09 | End: 2017-10-10

## 2017-10-09 RX ORDER — GLUCAGON 1 MG
1 KIT INJECTION
Status: DISCONTINUED | OUTPATIENT
Start: 2017-10-09 | End: 2017-10-10

## 2017-10-09 RX ORDER — SIMVASTATIN 40 MG/1
40 TABLET, FILM COATED ORAL NIGHTLY
Status: DISCONTINUED | OUTPATIENT
Start: 2017-10-09 | End: 2017-10-11 | Stop reason: HOSPADM

## 2017-10-09 RX ORDER — METOPROLOL TARTRATE 1 MG/ML
7.5 INJECTION, SOLUTION INTRAVENOUS EVERY 12 HOURS
Status: DISCONTINUED | OUTPATIENT
Start: 2017-10-09 | End: 2017-10-09

## 2017-10-09 RX ORDER — BISACODYL 10 MG
10 SUPPOSITORY, RECTAL RECTAL DAILY PRN
Status: DISCONTINUED | OUTPATIENT
Start: 2017-10-09 | End: 2017-10-11

## 2017-10-09 RX ORDER — IBUPROFEN 200 MG
24 TABLET ORAL
Status: DISCONTINUED | OUTPATIENT
Start: 2017-10-09 | End: 2017-10-10

## 2017-10-09 RX ORDER — INSULIN ASPART 100 [IU]/ML
0-5 INJECTION, SOLUTION INTRAVENOUS; SUBCUTANEOUS
Status: DISCONTINUED | OUTPATIENT
Start: 2017-10-09 | End: 2017-10-10

## 2017-10-09 RX ADMIN — POLYETHYLENE GLYCOL 3350 17 G: 17 POWDER, FOR SOLUTION ORAL at 09:10

## 2017-10-09 RX ADMIN — ASPIRIN 81 MG: 81 TABLET, COATED ORAL at 09:10

## 2017-10-09 RX ADMIN — FUROSEMIDE 40 MG: 10 INJECTION, SOLUTION INTRAVENOUS at 09:10

## 2017-10-09 RX ADMIN — METOPROLOL TARTRATE 75 MG: 50 TABLET, FILM COATED ORAL at 09:10

## 2017-10-09 RX ADMIN — POTASSIUM CHLORIDE 20 MEQ: 200 INJECTION, SOLUTION INTRAVENOUS at 05:10

## 2017-10-09 RX ADMIN — POTASSIUM CHLORIDE 20 MEQ: 200 INJECTION, SOLUTION INTRAVENOUS at 10:10

## 2017-10-09 RX ADMIN — Medication 3 ML: at 09:10

## 2017-10-09 RX ADMIN — FUROSEMIDE 40 MG: 10 INJECTION, SOLUTION INTRAVENOUS at 05:10

## 2017-10-09 RX ADMIN — OXYCODONE HYDROCHLORIDE 5 MG: 5 TABLET ORAL at 10:10

## 2017-10-09 RX ADMIN — OXYCODONE HYDROCHLORIDE 5 MG: 5 TABLET ORAL at 09:10

## 2017-10-09 RX ADMIN — FAMOTIDINE 20 MG: 10 INJECTION, SOLUTION INTRAVENOUS at 09:10

## 2017-10-09 RX ADMIN — MAGNESIUM SULFATE IN WATER 2 G: 40 INJECTION, SOLUTION INTRAVENOUS at 05:10

## 2017-10-09 RX ADMIN — TICAGRELOR 90 MG: 90 TABLET ORAL at 09:10

## 2017-10-09 RX ADMIN — OXYCODONE HYDROCHLORIDE 10 MG: 5 TABLET ORAL at 03:10

## 2017-10-09 RX ADMIN — BISACODYL 10 MG: 10 SUPPOSITORY RECTAL at 09:10

## 2017-10-09 RX ADMIN — Medication 3 ML: at 02:10

## 2017-10-09 RX ADMIN — MUPIROCIN 1 G: 20 OINTMENT TOPICAL at 09:10

## 2017-10-09 RX ADMIN — Medication 3 ML: at 06:10

## 2017-10-09 RX ADMIN — SIMVASTATIN 40 MG: 20 TABLET, FILM COATED ORAL at 09:10

## 2017-10-09 NOTE — PROGRESS NOTES
"[facility]  Adult Nutrition  Progress Note    SUMMARY     Recommendations    Recommendation/Intervention:   1. Cont to adv diet as kay'd to Cardiac; if meal intake is consistently <50%, order Boost Plus TID    2. CSU RD to offer diet educ at f/u eval  Goals: Diet advancement within 48 hours  Nutrition Goal Status: goal met  Communication of RD Recs: reviewed with RN    Reason for Assessment    Reason for Assessment: RD follow-up  Diagnosis: cardiac disease (s/p CABG )  Relevent Medical History: CAD, HLD, HTN, schatziring s/p dilation   Interdisciplinary Rounds: did not attend     General Information Comments: Pt kay'ing Cl's well. Plans noted to step down today. Chest tube removed today    Nutrition Discharge Planning: Adeuquate PO nutrition, RD to provide cardiac diet education at follow up    Nutrition Prescription Ordered    Current Diet Order: CL  Nutrition Order Comments: 1500ml FR            % Intake of Estimated Energy Needs: 25 - 50 %  % Meal Intake: 50%     Nutrition Risk Screen     Nutrition Risk Screen: no indicators present    Nutrition/Diet History    Patient Reported Diet/Restrictions/Preferences: general  Typical Food/Fluid Intake: RACHEAL  Food Preferences: No Denominational or cultural food preferences identified        Factors Affecting Nutritional Intake: other (see comments) (on CL's)                Labs/Tests/Procedures/Meds       Pertinent Labs Reviewed: reviewed  Pertinent Labs Comments: Na 135, POCT 84-96, Phos 1.7, A1C 5.3  Pertinent Medications Reviewed: reviewed  Pertinent Medications Comments: KCl, lasix, statin    Physical Findings    Overall Physical Appearance: nourished     Oral/Mouth Cavity: WDL  Skin: incision (chest)    Anthropometrics    Temp: 98.4 °F (36.9 °C)     Height: 5' 9" (175.3 cm)  Weight Method: Stated  Weight: 82.1 kg (181 lb)  Ideal Body Weight (IBW), Male: 160 lb     % Ideal Body Weight, Male (lb): 113.12 lb     BMI (Calculated): 26.8  BMI Grade: 25 - 29.9 " - overweight                            Estimated/Assessed Needs    Weight Used For Calorie Calculations: 82.1 kg (181 lb)   Height (cm): 175.3 cm  Energy Calorie Requirements (kcal): 2101  Energy Need Method: [place]-St Jeor (1.25 PAL)     RMR ([place]-St. Jeor Equation): 1681.38        Weight Used For Protein Calculations: 82.1 kg (181 lb)  Protein Requirements: 82-99 gms (1-1.2 gms/kg)     Fluid Need Method: RDA Method        RDA Method (mL): 2101               Assessment and Plan    S/P CABG x 3     Nutrition Diagnosis:  Increased nutrient needs     Related to (etiology):   Physiological factors s/p CABG x 3     Signs and Symptoms (as evidenced by):   EPN     Interventions/Recommendations (treatment strategy):  See recs above.     Nutrition Diagnosis Status:   Continues         Monitor and Evaluation    Food and Nutrient Intake: energy intake, food and beverage intake  Food and Nutrient Adminstration: diet order  Knowledge/Beliefs/Attitudes: food and nutrition knowledge/skill  Physical Activity and Function: nutrition-related ADLs and IADLs  Anthropometric Measurements: weight, weight change, body mass index  Biochemical Data, Medical Tests and Procedures: electrolyte and renal panel, gastrointestinal profile, glucose/endocrine profile, inflammatory profile, lipid profile  Nutrition-Focused Physical Findings: overall appearance    Nutrition Risk    Level of Risk:  (2x/week)    Nutrition Follow-Up    RD Follow-up?: Yes

## 2017-10-09 NOTE — PLAN OF CARE
Problem: Patient Care Overview  Goal: Plan of Care Review  Outcome: Ongoing (interventions implemented as appropriate)  AAOx4. Follows commands. Afebrile. Tachycardic, O2 sats 100% t/o shift. Pt and spouse updated on POC throughout shift. Will continue to monitor very closely. See flowsheet for full assessments.

## 2017-10-09 NOTE — PLAN OF CARE
S/P CABG x3 on 10/6/17. CM informed pt and wife LOS and dc plan. No dc needs noted at this time. CM gave pt heart surgery pillow to use to splint incision to cough and deep breathe.

## 2017-10-09 NOTE — PROGRESS NOTES
[facility]  Cardiothoracic Surgery  Progress Note    Patient Name: Fausto Patterson  MRN: [id number]  Admission Date: 10/2/2017  Hospital Length of Stay: 7 days  Code Status: Full Code   Attending Physician: Joe Mejia MD   Referring Provider: Chelsea Callahan MD  Principal Problem:CAD (coronary artery disease), native coronary artery    Subjective:     Post-Op Info:  Procedure(s) (LRB):  AORTOCORONARY BYPASS-CABG/CABGX3 (N/A)  HARVEST-VEIN-ENDOVASCULAR (Left)   3 Days Post-Op     Interval History: NAEON.  Sinus tachycardia.  CT output decreasing.  Pain controlled.  Good UOP    Medications:  Continuous Infusions:   insulin (HUMAN R) infusion (adults) 1.5 Units/hr (10/08/17 0600)    nicardipine Stopped (10/06/17 1545)     Scheduled Meds:   aspirin  81 mg Oral Daily    clopidogrel  75 mg Oral Daily    famotidine (PF)  20 mg Intravenous BID    furosemide  20 mg Intravenous BID    metoprolol tartrate  25 mg Oral Once    metoprolol tartrate  50 mg Oral BID    mupirocin  1 g Nasal BID    mupirocin   Nasal Once    polyethylene glycol  17 g Oral Daily    sodium chloride 0.9%  3 mL Intravenous Q8H     PRN Meds:albumin human 5%, dextrose 50%, dextrose 50%, glucagon (human recombinant), glucose, glucose, insulin aspart, lactated ringers, lactulose, magnesium sulfate IVPB, metoclopramide HCl, ondansetron, oxycodone, oxycodone, potassium chloride **AND** potassium chloride **AND** potassium chloride     Objective:     Vital Signs (Most Recent):  Temp: 99.5 °F (37.5 °C) (10/08/17 0400)  Pulse: (!) 117 (10/08/17 0600)  Resp: (!) 32 (10/08/17 0500)  BP: 113/68 (10/08/17 0600)  SpO2: 95 % (10/08/17 0600) Vital Signs (24h Range):  Temp:  [97.7 °F (36.5 °C)-99.5 °F (37.5 °C)] 99.5 °F (37.5 °C)  Pulse:  [] 117  Resp:  [11-32] 32  SpO2:  [93 %-99 %] 95 %  BP: ()/(53-72) 113/68  Arterial Line BP: (106-136)/(54-64) 123/60     Weight: 81.6 kg (180 lb)  Body mass index is 26.58 kg/m².    SpO2: 95  %  O2 Device (Oxygen Therapy): room air    Intake/Output - Last 3 Shifts       10/06 0700 - 10/07 0659 10/07 0700 - 10/08 0659 10/08 0700 - 10/09 0659    P.O. 120 360     I.V. (mL/kg) 3474.5 (42.6) 865 (10.6)     Blood 500      Total Intake(mL/kg) 4094.5 (50.2) 1225 (15)     Urine (mL/kg/hr) 2180 (1.1) 1915 (1)     Stool       Chest Tube 941 (0.5) 365 (0.2)     Total Output 3121 2280      Net +973.5 -1055                   Lines/Drains/Airways     Central Venous Catheter Line                 Percutaneous Central Line Insertion/Assessment - quad lumen  10/06/17 0716 right internal jugular;other (see comments) 2 days          Drain                 Urethral Catheter 10/06/17 0723 Straight-tip;Temperature probe;Non-latex 16 Fr. 2 days         Y Chest Tube 1 and 2 10/06/17 1347 1 Right Mediastinal 32 Fr. 2 Left Mediastinal 32 Fr. 1 day         Y Chest Tube 3 and 4 10/06/17 1349 3 Right Pleural 32 Fr. 4 Left Pleural 32 Fr. 1 day          Arterial Line                 Arterial Line 10/07/17 1800 Left less than 1 day          Line                 Pacer Wires 10/06/17 1346 1 day          Peripheral Intravenous Line                 Peripheral IV - Single Lumen 10/06/17 0742 Right Forearm 2 days                Physical Exam   Constitutional: He is oriented to person, place, and time. He appears well-developed and well-nourished. No distress.   Cardiovascular: Normal rate and intact distal pulses.    Pulmonary/Chest: Effort normal and breath sounds normal. No respiratory distress.   Abdominal: Soft. He exhibits no distension.   Musculoskeletal: He exhibits no edema.   Neurological: He is alert and oriented to person, place, and time.   Psychiatric: He has a normal mood and affect.       Significant Labs:  ABGs:   Recent Labs  Lab 10/07/17  1909   PH 7.421   PCO2 42.0   PO2 67*   HCO3 27.3   POCSATURATED 93*   BE 3     CBC:   Recent Labs  Lab 10/08/17  0546   WBC 10.99   RBC 3.20*   HGB 9.7*   HCT 28.5*      MCV 89   MCH  30.3   MCHC 34.0     CMP:   Recent Labs  Lab 10/08/17  0547      CALCIUM 8.7      K 4.1   CO2 24      BUN 15   CREATININE 1.0     Coagulation:   Recent Labs  Lab 10/07/17  0300   INR 1.1   APTT 28.7       Significant Diagnostics:  Reviewed    Assessment/Plan:     * CAD (coronary artery disease), native coronary artery    - POD 3 CABG  - PRN pain meds  - Wean supplemental O2  - Will d/c mediastinal tubes  - Lasix 40 BID  - Metop 75 BID  - Step down to CTSU             Trell Coronado MD  Cardiothoracic Surgery  [facility]

## 2017-10-09 NOTE — PLAN OF CARE
Problem: Patient Care Overview  Goal: Plan of Care Review  Outcome: Ongoing (interventions implemented as appropriate)  Dx: S/p CABG  Shift Events: OOB to chair, Chest tube removed  Neuro: AAOx4, Follows commands, moves all extremities  Vital Signs: Sinus tach, MAP 60-90   Intake: Clear liquid diet  Output: 3 BM in bedside commode, 550 mL per urinal  Pain Management: Pt. Given 5 mg oxy before chest tube removal   Labs: K/Mag checked q4h, K replaced  Skin: No redness or breakdown on sacrum, heels, or elbows. Will continue to monitor

## 2017-10-09 NOTE — SUBJECTIVE & OBJECTIVE
"Interval HPI:   No acute events overnight. Patient is tolerating clears well with no c/o nausea. Afebrile. No hypoglycemia. Insulin infusion off since 0700 today.     /67   Pulse (!) 115   Temp 97.4 °F (36.3 °C) (Oral)   Resp (!) 41   Ht 5' 9" (1.753 m)   Wt 82.1 kg (181 lb)   SpO2 99%   BMI 26.73 kg/m²     Labs Reviewed and Include      Recent Labs  Lab 10/09/17  0606   GLU 97   CALCIUM 8.8   *   K 4.1   CO2 23      BUN 16   CREATININE 1.0     Lab Results   Component Value Date    WBC 8.67 10/09/2017    HGB 9.4 (L) 10/09/2017    HCT 27.3 (L) 10/09/2017    MCV 86 10/09/2017     10/09/2017     No results for input(s): TSH, FREET4 in the last 168 hours.  Lab Results   Component Value Date    HGBA1C 5.3 10/06/2017       Nutritional status:   Body mass index is 26.73 kg/m².  Lab Results   Component Value Date    ALBUMIN 4.0 10/06/2017    ALBUMIN 4.0 10/05/2017    ALBUMIN 4.5 10/04/2017     No results found for: PREALBUMIN    Estimated Creatinine Clearance: 90.3 mL/min (based on SCr of 1 mg/dL).    Accu-Checks  Recent Labs      10/07/17   2001  10/08/17   0011  10/08/17   0440  10/08/17   0807  10/08/17   1142  10/08/17   1614  10/08/17   2130  10/09/17   0319  10/09/17   0540  10/09/17   0723   POCTGLUCOSE  115*  117*  117*  105  143*  121*  105  96  107  96       Current Medications and/or Treatments Impacting Glycemic Control  Immunotherapy:  Immunosuppressants     None        Steroids:   Hormones     None        Pressors:    Autonomic Drugs     None        Hyperglycemia/Diabetes Medications: Antihyperglycemics     Start     Stop Route Frequency Ordered    10/07/17 0930  insulin regular (Humulin R) 100 Units in sodium chloride 0.9% 100 mL infusion      -- IV Continuous 10/07/17 0823    10/07/17 0922  insulin aspart pen 0-4 Units      -- SubQ As needed (PRN) 10/07/17 0823        "

## 2017-10-09 NOTE — OP NOTE
DATE OF PROCEDURE:  10/06/2017    PREOPERATIVE DIAGNOSES:  1.  Positive stress test.  2.  Coronary artery disease, native coronary arteries.  3.  Acute angina.  4.  Coronary atherosclerosis.  5.  Schatzki ring.  6.  Calcium oxalate kidney stones.    POSTOPERATIVE DIAGNOSES:  1.  Positive stress test.  2.  Coronary artery disease, native coronary arteries.  3.  Acute angina.  4.  Coronary atherosclerosis.  5.  Schatzki ring.  6.  Calcium oxalate kidney stones.    OPERATIONS:  Coronary artery bypass grafting x3 using bilateral mammary arteries   in skeletonized fashion, left internal mammary artery to LAD, right mammary   artery to distal RCA, SVG to OM1.  Endoscopic vein harvest of the saphenous vein   graft from the left lower extremity.    STAFF SURGEON:  Joe Mejia M.D.    FIRST ASSISTANT:  Ever Mendoza M.D. (RES)    SECOND ASSISTANT:  Laila Holley.    ANESTHESIA:  GETA.    ESTIMATED BLOOD LOSS:  200 mL    KEY FINDINGS OF THE OPERATION:  1.  Extremely small caliber right mammary artery, however, with good flow.  2.  The distal RCA was also extremely small caliber; however, good anastomosis   was performed.  3.  Saphenous vein graft to OM1 had 40 mL per minute of flow at 120 mmHg   pressure.  4.  The left mammary artery was also small in caliber; however, had excellent   flow.    INDICATION OF OPERATION:  This is a 47-year-old gentleman who presented for a   stress test and was found to have a positive stress.  While having a positive   stress test, the patient had an ST elevated MI, angina and underwent an   angiogram, which revealed 3-vessel disease.  The patient has a strong family   history of coronary artery disease.  The patient was admitted and Cardiothoracic   Surgery was consulted.  The patient was worked up and was found to be a good   candidate for surgical revascularization.  Due to the patient's age, bilateral   mammary artery use was discussed, understanding the slightly increased risk of    sternal incision healing and also skeletonized technique was discussed that   would be used to decrease the chances of sternal infection and nonunion.  The   risks and benefits were discussed.  The patient understood the risks, benefits   and alternatives and wanted to proceed with surgical revascularization.  An   informed consent was obtained.    DESCRIPTION OF PROCEDURE:  The patient was brought to the Operating Room and   placed in a supine position.  After induction of anesthesia, area was prepped   and draped in the usual sterile fashion.  An upper midline incision was made,   which was carried all the way down to the sternum.  Median sternotomy was then   performed.  Sternal edges were cauterized.  A chest retractor was placed.  The   pericardium was then opened up.  Left Rultract retractor was placed.  The left   internal mammary artery was taken down in a skeletonized fashion.  It was found   to be a small vessel; however, we decided to continue with the takedown of the   left internal mammary artery.  All the branches were clipped and cut.  Once it   was completely freed, the Rultract was then placed on the right side.  The right   side internal mammary artery was also taken down in a skeletonized fashion.  It   was found to be even smaller caliber vessel; however, decision was made to   proceed.  Once that was done, we ensured that the phrenic artery was away from   the dissection plane.  Once that was done, endoscopic vein harvest of the   saphenous vein graft from the left lower extremity was also initiated.  Good   quality of saphenous vein graft was obtained.  However, there was found to be   multiple areas of aneurysm.  Systemic heparinization was carried out.  Once   systemic heparinization was carried out, the distal portions of the left   internal mammary artery and the right mammary artery was ligated 3 minutes after   administration of systemic heparin.  The BETH and the LIMA were prepared  with   papaverine to remove any vasospasm.  Once that was done, the retractor was   placed and the cannulation stitches were placed.  Arterial cannula was placed in   the ascending aorta, venous cannula placed in the right atrial appendage.    Retrograde cardioplegia catheter placed in the coronary sinus and antegrade   cardioplegia catheter placed in the ascending aorta.  The patient was placed on   full cardiopulmonary bypass.  Crossclamp was applied and cardiac standstill was   obtained by using an antegrade and retrograde admixture of cardioplegia.  Once   that was done, the visual structures were visualized.  The posterior aspect was   visualized.  The ramus, which had bifurcated in the AV groove and the 2 branches   that were coming off were extremely small in caliber.  The OM1 appeared to be a   much decent vessel and a decision was made to do the anastomosis with a vein graft.    The artery was dissected and an aortotomy was made.  This vein graft was brought   to the field, spatulated at 45 degrees angle and continuous running anastomosis   using a 7-0 Prolene stitch was obtained.  It was tested for hemostasis.  Good   hemostasis was obtained.  Once that was achieved, then dose of cardioplegia was   given.  After that, dissection of the right coronary artery was carried out.    The distal right coronary artery was dissected out.  It was found to be small in   caliber and the BETH now was tested after being prepared with papaverine.  It   was small in caliber; however, there was a good flow noted through it and a   decision was made to perform the anastomosis.  We performed the anastomosis   using a 7-0 Prolene stitch in a continuous running fashion.  Good hemostasis was   ensured.  Another dose of cardioplegia was given.  After that, the mid portion   of the LAD right after the last diagonal branch was opened up and using a 7-0   Prolene stitch, a continuous running anastomosis of the left internal mammary    artery to the LAD was performed after spatulation.  Excellent flow was noted   through the LIMA to the LAD anastomosis.  Another dose of cardioplegia was given   at this stage using the same graft as well as the retrograde cardioplegia.  The   proximal anastomosis site of the saphenous vein graft to the ascending aorta   was initiated.  The proximal anastomosis site was prepared by using a 4 mm   aortic punch.  A 5-0 Prolene stitch was used to perform a running anastomosis in   the form of a cobra head.  Once that was done, hot shot was delivered over 3   minutes and crossclamp was removed.  Instant cardiac activity was noted and   heart was reperfused.  Ventilation was resumed and gradually the patient was   weaned off from cardiopulmonary bypass.  The patient  without any   problems.  Excellent heart function was noted.  No intracardiac air was noted.    Test dose of protamine was given followed by full dose of protamine to reverse   the effects of systemic heparin.  Midway dose, all the various catheters and   cannulas were removed.  Once that was done, then temporary ventricular pacing   wires were placed on the heart and brought out through separate skin incisions.    Two mediastinal and 1 chest tube in each pleural space was placed and brought   out through separate incision and connected to Pleur-evac.  Good hemostasis was   ensured.  Once we were satisfied, sternum was approximated by #6 stainless steel   wires.  Skin and subcutaneous tissues were closed in multiple layers.  Sterile   dressing was applied.  Terminal count of needles, sponges, and instrument was   found to be correct.    MEDICARE ATTESTATION:  Due to unavailability of an adequately trained   Cardiothoracic Surgery resident, I performed all parts of the operation myself.      JAREN  dd: 10/08/2017 09:10:14 (CDT)  td: 10/08/2017 14:00:12 (CDT)  Doc ID   #[id number]  Job ID #[id number]    CC:

## 2017-10-09 NOTE — PLAN OF CARE
Problem: Spiritual Distress, Risk/Actual (Adult,Obstetrics,Pediatric)  Intervention: Facilitate Personal Exploration/Expression of Spirituality  F - Saundra and Belief: Pt of [employer] saundra and expressed thankfulness for his supportive Taoism community and their prayers.     I - Importance: Saundra is an important source of strength for pt.     C - Community: Pt's Taoism is involved in his care and his  visited and offered anointing of the sick.     A - Address in Care: Introduced and offered pastoral support with reflective listening and prayer upon request. Pt made aware of 's presence as needed.

## 2017-10-09 NOTE — PROGRESS NOTES
Dr. Coronado to bedside to remove chest tube. Pain medication given before tube removal. Pt. Tolerated well. Will continue to monitor.

## 2017-10-09 NOTE — PROGRESS NOTES
"[facility]  Endocrinology  Progress Note    Admit Date: 10/2/2017     Reason for Consult: Management of  Hyperglycemia     Surgical Procedure and Date: CABG x3 on 10/6/17      HPI:   Patient is a 47 y.o. male with a diagnosis of HLD, HTN, schatzki ring s/p dilation, acute angina during treadmill stress test with subsequent left heart cath identifying significant CAD. Now s/p CABG x3. No previous history of diabetes. Denies family history of diabetes.   Endocrine consulted for postoperative BG management.            Interval HPI:   No acute events overnight. Patient is tolerating clears well with no c/o nausea. Afebrile. No hypoglycemia. Insulin infusion off since 0700 today.     /67   Pulse (!) 115   Temp 97.4 °F (36.3 °C) (Oral)   Resp (!) 41   Ht 5' 9" (1.753 m)   Wt 82.1 kg (181 lb)   SpO2 99%   BMI 26.73 kg/m²      Labs Reviewed and Include      Recent Labs  Lab 10/09/17  0606   GLU 97   CALCIUM 8.8   *   K 4.1   CO2 23      BUN 16   CREATININE 1.0     Lab Results   Component Value Date    WBC 8.67 10/09/2017    HGB 9.4 (L) 10/09/2017    HCT 27.3 (L) 10/09/2017    MCV 86 10/09/2017     10/09/2017     No results for input(s): TSH, FREET4 in the last 168 hours.  Lab Results   Component Value Date    HGBA1C 5.3 10/06/2017       Nutritional status:   Body mass index is 26.73 kg/m².  Lab Results   Component Value Date    ALBUMIN 4.0 10/06/2017    ALBUMIN 4.0 10/05/2017    ALBUMIN 4.5 10/04/2017     No results found for: PREALBUMIN    Estimated Creatinine Clearance: 90.3 mL/min (based on SCr of 1 mg/dL).    Accu-Checks  Recent Labs      10/07/17   2001  10/08/17   0011  10/08/17   0440  10/08/17   0807  10/08/17   1142  10/08/17   1614  10/08/17   2130  10/09/17   0319  10/09/17   0540  10/09/17   0723   POCTGLUCOSE  115*  117*  117*  105  143*  121*  105  96  107  96       Current Medications and/or Treatments Impacting Glycemic Control  Immunotherapy:  " Immunosuppressants     None        Steroids:   Hormones     None        Pressors:    Autonomic Drugs     None        Hyperglycemia/Diabetes Medications: Antihyperglycemics     Start     Stop Route Frequency Ordered    10/07/17 0930  insulin regular (Humulin R) 100 Units in sodium chloride 0.9% 100 mL infusion      -- IV Continuous 10/07/17 0823    10/07/17 0922  insulin aspart pen 0-4 Units      -- SubQ As needed (PRN) 10/07/17 0823          ASSESSMENT and PLAN    * CAD (coronary artery disease), native coronary artery    S/p CABG. Avoid hypoglycemia.           Stress hyperglycemia    BG goal 110-140 first 3 post op days then 140-180. Change to low dose correction scale, AC/HS BG monitoring. Anticipating resolution, sign off in the next 24 hours.     Discharge recommendations  Anticipating resolution          S/P CABG x 3    Per CTS.   Avoid hypoglycemia.               Kareem Martínez, CALIN, NP  Endocrinology  [facility]

## 2017-10-09 NOTE — PROGRESS NOTES
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 Callahan, for 1-2 months of intermittent bilateral chest pain with moderate to strenuous exercise while doing an intense workout regimen, the P90X program. The chest pain was always alleviated by rest. He had a prior episode of chest pain with exertion and vertigo for which he went to the ED, had an EKG, and was d/c-ed home. Today, on the treadmill test, he reached goal HR and BP and started having chest pain after 9 minutes, with ST depressions in inferolateral leads for 15-18 mins, and chest pain resolved after 18 minutes of rest. No nitro given. 2D Echo with CFD showed a normal heart with LVEF 65, normal diastolic function, mildly elevated PA pressure of 26 mmHg.        He went emergently to cath lab, found to have multivessel CAD so no PCI done await CTS consult. No complaints on admit. Never had chest pain at rest, only with exertion. No palpitations, syncope, f/c, or other sx.      Presents to SICU today after 3 vessel CABG. Currently hemodynamically stable on non rebreather mask. Pleura vac x 2 with minimal output since pt has been on floor. Management per CTS team.     Interval history:      NAEON. Patient continues doing well this am. Tolerating PO intake, well controlled on PO pain meds. Insulin gtt weaned off.       Continuous Infusions:   insulin (HUMAN R) infusion " (adults) 0.4 Units/hr (10/09/17 0100)    nicardipine Stopped (10/06/17 1545)     Scheduled Meds:   aspirin  81 mg Oral Daily    famotidine (PF)  20 mg Intravenous BID    furosemide  40 mg Intravenous BID    metoprolol tartrate  25 mg Oral Once    metoprolol tartrate  50 mg Oral BID    mupirocin  1 g Nasal BID    mupirocin   Nasal Once    polyethylene glycol  17 g Oral Daily    sodium chloride 0.9%  3 mL Intravenous Q8H    ticagrelor  90 mg Oral BID     PRN Meds:albumin human 5%, dextrose 50%, dextrose 50%, glucagon (human recombinant), glucose, glucose, insulin aspart, lactated ringers, lactulose, magnesium sulfate IVPB, metoclopramide HCl, ondansetron, oxycodone, oxycodone, potassium chloride **AND** potassium chloride **AND** potassium chloride    Review of patient's allergies indicates:   Allergen Reactions    Clarithromycin Other (See Comments)     hiccups    Levaquin [levofloxacin] Hives    Naldecon-ex Hives       OBJECTIVE:     Vital Signs (Most Recent)  Temp: 99.2 °F (37.3 °C) (10/08/17 1900)  Pulse: (!) 139 (10/09/17 0600)  Resp: (!) 42 (10/09/17 0600)  BP: 125/74 (10/09/17 0600)  SpO2: 99 % (10/09/17 0600)    Vital Signs Range (Last 24H):  Temp:  [98.7 °F (37.1 °C)-100.1 °F (37.8 °C)]   Pulse:  []   Resp:  [1-42]   BP: ()/(54-78)   SpO2:  [94 %-100 %]   Arterial Line BP: ()/(50-81)     I & O (Last 24H):    Intake/Output Summary (Last 24 hours) at 10/09/17 0700  Last data filed at 10/09/17 0600   Gross per 24 hour   Intake           879.21 ml   Output             2300 ml   Net         -1420.79 ml       Physical Exam:  Gen:  No acute distress  CV:  Peripherally well-perfused.  Pulses 2+ bilaterally. RRR  Lungs:  Normal respiratory effort. Lungs clear to auscultation.   Abdomen:  Soft, non-tender, non-distended  Head/Neck:  Normocephalic.  Atraumatic. No TTP, AROM and PROM intact without pain  Neuro:  CN intact without deficit, SILT throughout B/L Upper & Lower  Extremities  Pelvis: No TTP, Stable to direct anterior pressure over ASIS.     Wound/Incision:  clean, dry, intact    Laboratory (Last 24H):  CBC:  No results for input(s): WBC, HGB, HCT, PLT in the last 24 hours.  CMP:    Recent Labs  Lab 10/09/17  0606   CALCIUM 8.8   *   K 4.1   CO2 23      BUN 16   CREATININE 1.0       Chest X-Ray: X-ray Chest Ap Portable    Result Date: 10/6/2017  Satisfactory postoperative chest radiograph with sternal sutures well aligned. Electronically signed by: Karolina Quick MD Date:     10/06/17 Time:    15:34           ASSESSMENT/PLAN:         Fausto Patterson is a 47 y.o. male s/p 3 vessel CABG yesterday with a stable postoperative course. Will continue to monitor in SICU this morning with probable step down later today.         Plan:     Neuro:   - Pain Control per primary      Pulmonary:   -  Maintaining sats on room air  - Chest tubes to suction 50mL output     Cardiac:  -MAP goal >65  - Management per CTS team      Renal:   - voiding, UOP adequate  - Continue to follow UOP     Fluids/Electrolytes/Nutrition/GI:   -replace lytes PRN  -maintenance fluids   - Strict I/O  -NGT/G tube output     Hematology/Oncology:  -H/H stable, continue to follow  -INR/Plts     Infectious Disease:   -Afebrile  -WBC wnl      Endocrine:  -Glucose goal of 120-150  -SSI     Dispo:  -Stepdown today per primary team.     Radhika Chi, PGY-1  General Surgery  [phone]

## 2017-10-10 PROBLEM — R73.9 STRESS HYPERGLYCEMIA: Status: RESOLVED | Noted: 2017-10-06 | Resolved: 2017-10-10

## 2017-10-10 LAB
MAGNESIUM SERPL-MCNC: 2.2 MG/DL
MAGNESIUM SERPL-MCNC: 2.4 MG/DL
MAGNESIUM SERPL-MCNC: 2.6 MG/DL
PHOSPHATE SERPL-MCNC: 3.2 MG/DL
POCT GLUCOSE: 109 MG/DL (ref 70–110)
POTASSIUM SERPL-SCNC: 3.9 MMOL/L
POTASSIUM SERPL-SCNC: 4 MMOL/L
POTASSIUM SERPL-SCNC: 4.1 MMOL/L

## 2017-10-10 PROCEDURE — 63600175 PHARM REV CODE 636 W HCPCS: Performed by: THORACIC SURGERY (CARDIOTHORACIC VASCULAR SURGERY)

## 2017-10-10 PROCEDURE — 25000003 PHARM REV CODE 250: Performed by: STUDENT IN AN ORGANIZED HEALTH CARE EDUCATION/TRAINING PROGRAM

## 2017-10-10 PROCEDURE — 25000003 PHARM REV CODE 250: Performed by: THORACIC SURGERY (CARDIOTHORACIC VASCULAR SURGERY)

## 2017-10-10 PROCEDURE — 84132 ASSAY OF SERUM POTASSIUM: CPT

## 2017-10-10 PROCEDURE — 20600001 HC STEP DOWN PRIVATE ROOM

## 2017-10-10 PROCEDURE — 83735 ASSAY OF MAGNESIUM: CPT | Mod: 91

## 2017-10-10 PROCEDURE — 25000003 PHARM REV CODE 250: Performed by: NURSE PRACTITIONER

## 2017-10-10 PROCEDURE — 84100 ASSAY OF PHOSPHORUS: CPT

## 2017-10-10 PROCEDURE — 94761 N-INVAS EAR/PLS OXIMETRY MLT: CPT

## 2017-10-10 PROCEDURE — A4216 STERILE WATER/SALINE, 10 ML: HCPCS | Performed by: THORACIC SURGERY (CARDIOTHORACIC VASCULAR SURGERY)

## 2017-10-10 PROCEDURE — 63600175 PHARM REV CODE 636 W HCPCS: Performed by: STUDENT IN AN ORGANIZED HEALTH CARE EDUCATION/TRAINING PROGRAM

## 2017-10-10 PROCEDURE — 97116 GAIT TRAINING THERAPY: CPT

## 2017-10-10 PROCEDURE — 99233 SBSQ HOSP IP/OBS HIGH 50: CPT | Mod: ,,, | Performed by: SURGERY

## 2017-10-10 PROCEDURE — 99232 SBSQ HOSP IP/OBS MODERATE 35: CPT | Mod: ,,, | Performed by: NURSE PRACTITIONER

## 2017-10-10 RX ORDER — OXYCODONE HYDROCHLORIDE 5 MG/1
10 TABLET ORAL
Status: DISCONTINUED | OUTPATIENT
Start: 2017-10-10 | End: 2017-10-11

## 2017-10-10 RX ORDER — POTASSIUM CHLORIDE 20 MEQ/1
20 TABLET, EXTENDED RELEASE ORAL 2 TIMES DAILY
Status: DISCONTINUED | OUTPATIENT
Start: 2017-10-10 | End: 2017-10-11 | Stop reason: HOSPADM

## 2017-10-10 RX ORDER — FENTANYL CITRATE 50 UG/ML
50 INJECTION, SOLUTION INTRAMUSCULAR; INTRAVENOUS
Status: DISCONTINUED | OUTPATIENT
Start: 2017-10-10 | End: 2017-10-10

## 2017-10-10 RX ORDER — METOPROLOL TARTRATE 100 MG/1
100 TABLET ORAL 2 TIMES DAILY
Status: DISCONTINUED | OUTPATIENT
Start: 2017-10-10 | End: 2017-10-11 | Stop reason: HOSPADM

## 2017-10-10 RX ORDER — OXYCODONE HYDROCHLORIDE 5 MG/1
5 TABLET ORAL
Status: DISCONTINUED | OUTPATIENT
Start: 2017-10-10 | End: 2017-10-11 | Stop reason: HOSPADM

## 2017-10-10 RX ORDER — HYDROMORPHONE HYDROCHLORIDE 1 MG/ML
1 INJECTION, SOLUTION INTRAMUSCULAR; INTRAVENOUS; SUBCUTANEOUS
Status: DISCONTINUED | OUTPATIENT
Start: 2017-10-10 | End: 2017-10-10

## 2017-10-10 RX ORDER — FENTANYL CITRATE 50 UG/ML
25 INJECTION, SOLUTION INTRAMUSCULAR; INTRAVENOUS
Status: DISCONTINUED | OUTPATIENT
Start: 2017-10-10 | End: 2017-10-10

## 2017-10-10 RX ADMIN — POLYETHYLENE GLYCOL 3350 17 G: 17 POWDER, FOR SOLUTION ORAL at 08:10

## 2017-10-10 RX ADMIN — SIMVASTATIN 40 MG: 20 TABLET, FILM COATED ORAL at 09:10

## 2017-10-10 RX ADMIN — METOPROLOL TARTRATE 100 MG: 50 TABLET, FILM COATED ORAL at 08:10

## 2017-10-10 RX ADMIN — POTASSIUM CHLORIDE 20 MEQ: 1500 TABLET, EXTENDED RELEASE ORAL at 08:10

## 2017-10-10 RX ADMIN — Medication 3 ML: at 05:10

## 2017-10-10 RX ADMIN — OXYCODONE HYDROCHLORIDE 5 MG: 5 TABLET ORAL at 05:10

## 2017-10-10 RX ADMIN — FUROSEMIDE 40 MG: 10 INJECTION, SOLUTION INTRAVENOUS at 05:10

## 2017-10-10 RX ADMIN — ASPIRIN 81 MG: 81 TABLET, COATED ORAL at 08:10

## 2017-10-10 RX ADMIN — FUROSEMIDE 40 MG: 10 INJECTION, SOLUTION INTRAVENOUS at 08:10

## 2017-10-10 RX ADMIN — DOCUSATE SODIUM 50 MG: 50 CAPSULE, LIQUID FILLED ORAL at 01:10

## 2017-10-10 RX ADMIN — POTASSIUM CHLORIDE 20 MEQ: 200 INJECTION, SOLUTION INTRAVENOUS at 05:10

## 2017-10-10 RX ADMIN — TICAGRELOR 90 MG: 90 TABLET ORAL at 08:10

## 2017-10-10 RX ADMIN — Medication 3 ML: at 02:10

## 2017-10-10 RX ADMIN — OXYCODONE HYDROCHLORIDE 10 MG: 5 TABLET ORAL at 08:10

## 2017-10-10 NOTE — SUBJECTIVE & OBJECTIVE
"Interval HPI:   Overnight events: BG at goal, no insulin needs. Wife and patient requesting nutritional consult for cardiac diet before discharge  Eating:   clear liquid diet  Nausea: No  Hypoglycemia and intervention: No  Fever: No  TPN and/or TF: No  If yes, type of TF/TPN and rate:     /75 (BP Location: Left arm, Patient Position: Lying)   Pulse 105   Temp 97.6 °F (36.4 °C) (Oral)   Resp (!) 26   Ht 5' 9" (1.753 m)   Wt 82.2 kg (181 lb 3.5 oz)   SpO2 95%   BMI 26.76 kg/m²     Labs Reviewed and Include      Recent Labs  Lab 10/10/17  0753   K 4.1     Lab Results   Component Value Date    WBC 8.67 10/09/2017    HGB 9.4 (L) 10/09/2017    HCT 27.3 (L) 10/09/2017    MCV 86 10/09/2017     10/09/2017     No results for input(s): TSH, FREET4 in the last 168 hours.  Lab Results   Component Value Date    HGBA1C 5.3 10/06/2017       Nutritional status:   Body mass index is 26.76 kg/m².  Lab Results   Component Value Date    ALBUMIN 4.0 10/06/2017    ALBUMIN 4.0 10/05/2017    ALBUMIN 4.5 10/04/2017     No results found for: PREALBUMIN    Estimated Creatinine Clearance: 90.3 mL/min (based on SCr of 1 mg/dL).    Accu-Checks  Recent Labs      10/08/17   1142  10/08/17   1614  10/08/17   2130  10/09/17   0319  10/09/17   0540  10/09/17   0723  10/09/17   1207  10/09/17   1620  10/09/17   2124  10/10/17   0636   POCTGLUCOSE  143*  121*  105  96  107  96  84  95  96  109       Current Medications and/or Treatments Impacting Glycemic Control  Immunotherapy:  Immunosuppressants     None        Steroids:   Hormones     None        Pressors:    Autonomic Drugs     None        Hyperglycemia/Diabetes Medications: Antihyperglycemics     Start     Stop Route Frequency Ordered    10/09/17 0939  insulin aspart pen 0-5 Units      -- SubQ Before meals & nightly PRN 10/09/17 0839        "

## 2017-10-10 NOTE — PT/OT/SLP PROGRESS
Physical Therapy  Treatment    Fausto Patterson   MRN: [id number]   Admitting Diagnosis: CAD (coronary artery disease), native coronary artery    PT Received On: 10/10/17  PT Start Time: 1031     PT Stop Time: 1045    PT Total Time (min): 14 min       Billable Minutes:  Gait [id number] min    Treatment Type: Treatment        PTA Visit Number: 0       General Precautions: Standard, fall, sternal  Orthopedic Precautions: N/A   Braces:      Do you have any cultural, spiritual, Temple conflicts, given your current situation?: none noted     Subjective:  Communicated with nurse prior to session.      Pain/Comfort  Pain Rating 1: 3/10  Location 1:  (chest)  Pain Rating Post-Intervention 1: 3/10    Objective:   Patient found with: telemetry, arterial line, pulse ox (continuous), blood pressure cuff, central line    Functional Mobility:  Bed Mobility:   Sit to Supine: Moderate Assistance (pt needed verbal cues for functional mobility with sternal precautions and assist BLE management with sit to supine. )    Transfers:  Sit <> Stand Assistance: Contact Guard Assistance  Sit <> Stand Assistive Device: No Assistive Device    Gait:   Gait Distance: 220 ft with nursing with portable monitor.  Assistance 1: Contact Guard Assistance  Gait Assistive Device: Hand held assist    AM-PAC 6 CLICK MOBILITY  How much help from another person does this patient currently need?   1 = Unable, Total/Dependent Assistance  2 = A lot, Maximum/Moderate Assistance  3 = A little, Minimum/Contact Guard/Supervision  4 = None, Modified [place]/Independent    Turning over in bed (including adjusting bedclothes, sheets and blankets)?: 3  Sitting down on and standing up from a chair with arms (e.g., wheelchair, bedside commode, etc.): 3  Moving from lying on back to sitting on the side of the bed?: 3  Moving to and from a bed to a chair (including a wheelchair)?: 3  Need to walk in hospital room?: 3  Climbing 3-5 steps with a railing?: 3  Total Score:  18    AM-PAC Raw Score CMS G-Code Modifier Level of Impairment Assistance   6 % Total / Unable   7 - 9 CM 80 - 100% Maximal Assist   10 - 14 CL 60 - 80% Moderate Assist   15 - 19 CK 40 - 60% Moderate Assist   20 - 22 CJ 20 - 40% Minimal Assist   23 CI 1-20% SBA / CGA   24 CH 0% Independent/ Mod I     Patient left supine with all lines intact, call button in reach and wife present.    Assessment:  Fausto Patterson is a 47 y.o. male with a medical diagnosis of CAD (coronary artery disease), native coronary artery and presents with decreased mobility, transfers and decreased distance ambulated. Pt would benefit from cont PT to address deficits and will be able to discharge home with no DME or PT needs. Pt will benefit from skilled PT 4x/wk to return pt to Independent status.  Pt is s/p CABG 10/6/17.    Rehab identified problem list/impairments: Rehab identified problem list/impairments: impaired endurance, impaired functional mobilty, gait instability    Rehab potential is good.    Activity tolerance: Good    Discharge recommendations: Discharge Facility/Level Of Care Needs:  (no further PT is needed.)     Barriers to discharge: Barriers to Discharge: None    Equipment recommendations: Equipment Needed After Discharge: none     GOALS:    Physical Therapy Goals        Problem: Physical Therapy Goal    Goal Priority Disciplines Outcome Goal Variances Interventions   Physical Therapy Goal     PT/OT, PT Ongoing (interventions implemented as appropriate)     Description:  Goals to be met by: 10/18/17     Patient will increase functional independence with mobility by performin. Supine to sit with Supervision - not met  2. Sit to stand transfer with Supervision - not met  3. Gait  x 400 feet with Supervision. - not met  4. Ascend/descend 2 stair without Handrails Stand-by Assistance. - not met  5. Lower extremity exercise program x15 reps, with supervision, in order to increase LE strength and (I) with functional  mobility. - not met                       PLAN:    Patient to be seen 4 x/week  to address the above listed problems via gait training, therapeutic activities  Plan of Care expires: 11/06/17  Plan of Care reviewed with: patient, spouse         Annikageorgia Jackson, PT  10/10/2017

## 2017-10-10 NOTE — ASSESSMENT & PLAN NOTE
- POD 4 CABG  - PRN pain meds  - Lasix 40 BID  - Increase metoprolol to BID  - patient stepped down but waiting on CTSU bed

## 2017-10-10 NOTE — NURSING TRANSFER
Nursing Transfer Note      10/10/2017     Transfer To: 302    Transfer via wheelchair    Transfer with cardiac monitoring    Transported by LEO Aponte    Medicines sent: None    Chart send with patient: Yes    Notified: spouse, daughter    Patient reassessed at: 10/10/2017  6:23 PM      Upon arrival to floor: cardiac monitor applied, patient oriented to room, call bell in reach and bed in lowest position

## 2017-10-10 NOTE — PROGRESS NOTES
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 Callahan, for 1-2 months of intermittent bilateral chest pain with moderate to strenuous exercise while doing an intense workout regimen, the P90X program. The chest pain was always alleviated by rest. He had a prior episode of chest pain with exertion and vertigo for which he went to the ED, had an EKG, and was d/c-ed home. Today, on the treadmill test, he reached goal HR and BP and started having chest pain after 9 minutes, with ST depressions in inferolateral leads for 15-18 mins, and chest pain resolved after 18 minutes of rest. No nitro given. 2D Echo with CFD showed a normal heart with LVEF 65, normal diastolic function, mildly elevated PA pressure of 26 mmHg.        He went emergently to cath lab, found to have multivessel CAD so no PCI done await CTS consult. No complaints on admit. Never had chest pain at rest, only with exertion. No palpitations, syncope, f/c, or other sx.      Presents to SICU today after 3 vessel CABG. Currently hemodynamically stable on non rebreather mask. Pleura vac x 2 with minimal output since pt has been on floor. Management per CTS team.     Interval history:    No acute events overnight. Patient up and ambulating, tolerating clear liquid diet. Pain well controlled on PO pain meds. Has stepdown order to CTSU.       Continuous Infusions:   " nicardipine Stopped (10/06/17 1545)     Scheduled Meds:   aspirin  81 mg Oral Daily    furosemide  40 mg Intravenous BID    metoprolol tartrate  75 mg Oral BID    polyethylene glycol  17 g Oral Daily    simvastatin  40 mg Oral QHS    sodium chloride 0.9%  3 mL Intravenous Q8H    ticagrelor  90 mg Oral BID     PRN Meds:albumin human 5%, bisacodyl, dextrose 50%, dextrose 50%, glucagon (human recombinant), glucose, glucose, insulin aspart, lactated ringers, lactulose, magnesium sulfate IVPB, metoclopramide HCl, ondansetron, oxycodone, oxycodone, potassium chloride **AND** potassium chloride **AND** potassium chloride    Review of patient's allergies indicates:   Allergen Reactions    Clarithromycin Other (See Comments)     hiccups    Levaquin [levofloxacin] Hives    Naldecon-ex Hives       OBJECTIVE:     Vital Signs (Most Recent)  Temp: 98.7 °F (37.1 °C) (10/10/17 0300)  Pulse: 98 (10/10/17 0600)  Resp: 20 (10/10/17 0630)  BP: 135/79 (10/10/17 0600)  SpO2: (!) 94 % (10/10/17 0600)    Vital Signs Range (Last 24H):  Temp:  [98.4 °F (36.9 °C)-99.4 °F (37.4 °C)]   Pulse:  []   Resp:  [10-45]   BP: (102-135)/(61-81)   SpO2:  [93 %-100 %]   Arterial Line BP: ()/()     I & O (Last 24H):    Intake/Output Summary (Last 24 hours) at 10/10/17 0723  Last data filed at 10/10/17 0500   Gross per 24 hour   Intake              383 ml   Output             1375 ml   Net             -992 ml       Physical Exam:  Physical Exam   Constitutional: He is oriented to person, place, and time and well-developed, well-nourished, and in no distress.   HENT:   Head: Normocephalic and atraumatic.   Cardiovascular: Normal rate and regular rhythm.    Murmur heard.  Pulmonary/Chest: Effort normal and breath sounds normal. No respiratory distress. He has no wheezes. He has no rales.   Abdominal: Soft. He exhibits no distension. There is no tenderness.   Neurological: He is alert and oriented to person, place, and time.    Skin: Skin is warm and dry.   Psychiatric: Affect normal.      Wound/Incision:  clean, dry, intact    Laboratory (Last 24H):  CBC:  No results for input(s): WBC, HGB, HCT, PLT in the last 24 hours.  CMP:    Recent Labs  Lab 10/10/17  0345   K 3.9       Chest X-Ray: X-ray Chest Ap Portable shows improved aeration from prior, no acute changes      ASSESSMENT/PLAN:     Fausto Patterson is a 47 y.o. male s/p 3 vessel CABG yesterday with a stable postoperative course. Will continue to monitor in SICU this morning with probable step down later today.         Plan:     Neuro:   - PO oxy IR for pain control per primary      Pulmonary:   -  Maintaining sats on room air     Cardiac:  -MAP goal >65  - Management per CTS team      Renal:   - voiding, UOP adequate, monitor     Fluids/Electrolytes/Nutrition/GI:   -replace lytes PRN  - Strict I/O     Hematology/Oncology:  -H/H pending this morning, has been stable, continue to follow  -INR/Plts     Infectious Disease:   -Afebrile  -WBC pending this morning     Endocrine:  -Glucose goal of 120-150  -SSI     Dispo:  -Stepdown today per primary team.     Radhika Chi, PGY-1  General Surgery  [phone]

## 2017-10-10 NOTE — NURSING TRANSFER
Nursing Transfer Note      10/10/2017     Transfer from ICU    Transfer via wheelchair    Transfer with portable monitor    Transported by ICU RN    Medicines sent: no    Chart send with patient: yes    Notified: family at bedside    Patient reassessed at: 10/10/17; 7764

## 2017-10-10 NOTE — ASSESSMENT & PLAN NOTE
BG goal 110-140, BG at goal with no insulin needs. D/c acchecks and will sign off, Please re-consult if needed for bG consistently >180    Wife and patient requesting nutritional consult for cardiac diet teaching, notified CTS resident to initiate visit before discharge     Discharge recommendations: no endocrine needs at d/c

## 2017-10-10 NOTE — SUBJECTIVE & OBJECTIVE
Interval History: NAEON.  Sinus tachycardia. Pain controlled. Good UOP    Medications:  Continuous Infusions:     Scheduled Meds:   aspirin  81 mg Oral Daily    docusate sodium  50 mg Oral Daily    furosemide  40 mg Intravenous BID    metoprolol tartrate  100 mg Oral BID    polyethylene glycol  17 g Oral Daily    potassium chloride  20 mEq Oral BID    simvastatin  40 mg Oral QHS    sodium chloride 0.9%  3 mL Intravenous Q8H    ticagrelor  90 mg Oral BID     PRN Meds:albumin human 5%, bisacodyl, lactated ringers, lactulose, magnesium sulfate IVPB, metoclopramide HCl, ondansetron, oxycodone, oxycodone, potassium chloride **AND** potassium chloride **AND** potassium chloride     Objective:     Vital Signs (Most Recent):  Temp: 98.8 °F (37.1 °C) (10/10/17 1100)  Pulse: 89 (10/10/17 1100)  Resp: 13 (10/10/17 1100)  BP: 119/72 (10/10/17 1100)  SpO2: 96 % (10/10/17 1100) Vital Signs (24h Range):  Temp:  [97.6 °F (36.4 °C)-98.8 °F (37.1 °C)] 98.8 °F (37.1 °C)  Pulse:  [] 89  Resp:  [10-42] 13  SpO2:  [94 %-99 %] 96 %  BP: (102-142)/(61-86) 119/72  Arterial Line BP: ()/() 135/74     Weight: 82.2 kg (181 lb 3.5 oz)  Body mass index is 26.76 kg/m².    SpO2: 96 %  O2 Device (Oxygen Therapy): room air    Intake/Output - Last 3 Shifts       10/08 0700 - 10/09 0659 10/09 0700 - 10/10 0659 10/10 0700 - 10/11 0659    P.O. 640  180    I.V. (mL/kg) 239.2 (2.9) 183 (2.2)     IV Piggyback 100 200     Total Intake(mL/kg) 979.2 (11.9) 383 (4.7) 180 (2.2)    Urine (mL/kg/hr) 2205 (1.1) 1375 (0.7) 675 (1.6)    Stool  0 (0)     Chest Tube 140 (0.1)      Total Output 2345 1375 675    Net -1365.8 -992 -495           Stool Occurrence  3 x           Lines/Drains/Airways     Central Venous Catheter Line                 Percutaneous Central Line Insertion/Assessment - quad lumen  10/06/17 0716 right internal jugular;other (see comments) 4 days          Line                 Pacer Wires 10/06/17 1346 3 days                 Physical Exam   Constitutional: He is oriented to person, place, and time. He appears well-developed and well-nourished. No distress.   Cardiovascular: Normal rate and intact distal pulses.    Pulmonary/Chest: Effort normal and breath sounds normal. No respiratory distress.   Abdominal: Soft. He exhibits no distension.   Musculoskeletal: He exhibits no edema.   Neurological: He is alert and oriented to person, place, and time.   Psychiatric: He has a normal mood and affect.       Significant Labs:  No new labs    Significant Diagnostics:  Reviewed

## 2017-10-10 NOTE — PROGRESS NOTES
"[facility]  Endocrinology  Progress Note    Admit Date: 10/2/2017     Reason for Consult: Management of  Hyperglycemia     Surgical Procedure and Date: CABG x3 on 10/6/17    HPI:   Patient is a 47 y.o. male with a diagnosis of HLD, HTN, schatzki ring s/p dilation, acute angina during treadmill stress test with subsequent left heart cath identifying significant CAD. Now s/p CABG x3. No previous history of diabetes. Denies family history of diabetes.   Endocrine consulted for postoperative BG management.    Interval HPI:   Overnight events: BG at goal, no insulin needs. Wife and patient requesting nutritional consult for cardiac diet before discharge  Eating:   clear liquid diet  Nausea: No  Hypoglycemia and intervention: No  Fever: No  TPN and/or TF: No  If yes, type of TF/TPN and rate:     /75 (BP Location: Left arm, Patient Position: Lying)   Pulse 105   Temp 97.6 °F (36.4 °C) (Oral)   Resp (!) 26   Ht 5' 9" (1.753 m)   Wt 82.2 kg (181 lb 3.5 oz)   SpO2 95%   BMI 26.76 kg/m²       Labs Reviewed and Include      Recent Labs  Lab 10/10/17  0753   K 4.1     Lab Results   Component Value Date    WBC 8.67 10/09/2017    HGB 9.4 (L) 10/09/2017    HCT 27.3 (L) 10/09/2017    MCV 86 10/09/2017     10/09/2017     No results for input(s): TSH, FREET4 in the last 168 hours.  Lab Results   Component Value Date    HGBA1C 5.3 10/06/2017       Nutritional status:   Body mass index is 26.76 kg/m².  Lab Results   Component Value Date    ALBUMIN 4.0 10/06/2017    ALBUMIN 4.0 10/05/2017    ALBUMIN 4.5 10/04/2017     No results found for: PREALBUMIN    Estimated Creatinine Clearance: 90.3 mL/min (based on SCr of 1 mg/dL).    Accu-Checks  Recent Labs      10/08/17   1142  10/08/17   1614  10/08/17   2130  10/09/17   0319  10/09/17   0540  10/09/17   0723  10/09/17   1207  10/09/17   1620  10/09/17   2124  10/10/17   0636   POCTGLUCOSE  143*  121*  105  96  107  96  84  95  96  109       Current " Medications and/or Treatments Impacting Glycemic Control  Immunotherapy:  Immunosuppressants     None        Steroids:   Hormones     None        Pressors:    Autonomic Drugs     None        Hyperglycemia/Diabetes Medications: Antihyperglycemics     Start     Stop Route Frequency Ordered    10/09/17 0939  insulin aspart pen 0-5 Units      -- SubQ Before meals & nightly PRN 10/09/17 0839          ASSESSMENT and PLAN    * CAD (coronary artery disease), native coronary artery    S/p CABG. Avoid hypoglycemia.           Stress hyperglycemia    BG goal 110-140, BG at goal with no insulin needs. D/c acchecks and will sign off, Please re-consult if needed for bG consistently >180    Wife and patient requesting nutritional consult for cardiac diet teaching, notified CTS resident to initiate visit before discharge     Discharge recommendations: no endocrine needs at d/c           S/P CABG x 3    Per CTS.   Avoid hypoglycemia.               Ksenia Li NP  Endocrinology  [facility]

## 2017-10-10 NOTE — PROGRESS NOTES
[facility]  Cardiothoracic Surgery  Progress Note    Patient Name: Fausto Patterson  MRN: [id number]  Admission Date: 10/2/2017  Hospital Length of Stay: 8 days  Code Status: Full Code   Attending Physician: Joe Mejia MD   Referring Provider: Chelsea Callahan MD  Principal Problem:CAD (coronary artery disease), native coronary artery    Subjective:     Post-Op Info:  Procedure(s) (LRB):  AORTOCORONARY BYPASS-CABG/CABGX3 (N/A)  HARVEST-VEIN-ENDOVASCULAR (Left)   4 Days Post-Op     Interval History: NAEON.  Sinus tachycardia. Pain controlled. Good UOP    Medications:  Continuous Infusions:     Scheduled Meds:   aspirin  81 mg Oral Daily    docusate sodium  50 mg Oral Daily    furosemide  40 mg Intravenous BID    metoprolol tartrate  100 mg Oral BID    polyethylene glycol  17 g Oral Daily    potassium chloride  20 mEq Oral BID    simvastatin  40 mg Oral QHS    sodium chloride 0.9%  3 mL Intravenous Q8H    ticagrelor  90 mg Oral BID     PRN Meds:albumin human 5%, bisacodyl, lactated ringers, lactulose, magnesium sulfate IVPB, metoclopramide HCl, ondansetron, oxycodone, oxycodone, potassium chloride **AND** potassium chloride **AND** potassium chloride     Objective:     Vital Signs (Most Recent):  Temp: 98.8 °F (37.1 °C) (10/10/17 1100)  Pulse: 89 (10/10/17 1100)  Resp: 13 (10/10/17 1100)  BP: 119/72 (10/10/17 1100)  SpO2: 96 % (10/10/17 1100) Vital Signs (24h Range):  Temp:  [97.6 °F (36.4 °C)-98.8 °F (37.1 °C)] 98.8 °F (37.1 °C)  Pulse:  [] 89  Resp:  [10-42] 13  SpO2:  [94 %-99 %] 96 %  BP: (102-142)/(61-86) 119/72  Arterial Line BP: ()/() 135/74     Weight: 82.2 kg (181 lb 3.5 oz)  Body mass index is 26.76 kg/m².    SpO2: 96 %  O2 Device (Oxygen Therapy): room air    Intake/Output - Last 3 Shifts       10/08 0700 - 10/09 0659 10/09 0700 - 10/10 0659 10/10 0700 - 10/11 0659    P.O. 640  180    I.V. (mL/kg) 239.2 (2.9) 183 (2.2)     IV Piggyback 100 200     Total  Intake(mL/kg) 979.2 (11.9) 383 (4.7) 180 (2.2)    Urine (mL/kg/hr) 2205 (1.1) 1375 (0.7) 675 (1.6)    Stool  0 (0)     Chest Tube 140 (0.1)      Total Output 2345 1375 675    Net -1365.8 -992 -495           Stool Occurrence  3 x           Lines/Drains/Airways     Central Venous Catheter Line                 Percutaneous Central Line Insertion/Assessment - quad lumen  10/06/17 0716 right internal jugular;other (see comments) 4 days          Line                 Pacer Wires 10/06/17 1346 3 days                Physical Exam   Constitutional: He is oriented to person, place, and time. He appears well-developed and well-nourished. No distress.   Cardiovascular: Normal rate and intact distal pulses.    Pulmonary/Chest: Effort normal and breath sounds normal. No respiratory distress.   Abdominal: Soft. He exhibits no distension.   Musculoskeletal: He exhibits no edema.   Neurological: He is alert and oriented to person, place, and time.   Psychiatric: He has a normal mood and affect.       Significant Labs:  No new labs    Significant Diagnostics:  Reviewed    Assessment/Plan:     * CAD (coronary artery disease), native coronary artery    - POD 4 CABG  - PRN pain meds  - Lasix 40 BID  - Increase metoprolol to BID  - patient stepped down but waiting on CTSU bed             Trell Coronado MD  Cardiothoracic Surgery  [facility]

## 2017-10-10 NOTE — PLAN OF CARE
Problem: Patient Care Overview  Goal: Plan of Care Review  Outcome: Ongoing (interventions implemented as appropriate)  No acute events throughout shift   MAP 60-80  Accu cks AC/HS  Pt on room air   Mg & K q 4 hrs  Oxycodone 5 mg PRN for pain   Transfer orders to CTSU  All VSS  No new skin breakdown   Wife at bedside  Plan of care reviewed with pt & wife  Will continue to monitor closely

## 2017-10-10 NOTE — PLAN OF CARE
Problem: Physical Therapy Goal  Goal: Physical Therapy Goal  Goals to be met by: 10/18/17     Patient will increase functional independence with mobility by performin. Supine to sit with Supervision - not met  2. Sit to stand transfer with Supervision - not met  3. Gait  x 400 feet with Supervision. - not met  4. Ascend/descend 2 stair without Handrails Stand-by Assistance. - not met  5. Lower extremity exercise program x15 reps, with supervision, in order to increase LE strength and (I) with functional mobility. - not met     Goals remain appropriate. Annika Jackson, PT 10/10/2017

## 2017-10-11 VITALS
RESPIRATION RATE: 18 BRPM | WEIGHT: 170.63 LBS | HEART RATE: 102 BPM | SYSTOLIC BLOOD PRESSURE: 123 MMHG | TEMPERATURE: 98 F | BODY MASS INDEX: 25.27 KG/M2 | HEIGHT: 69 IN | DIASTOLIC BLOOD PRESSURE: 74 MMHG | OXYGEN SATURATION: 95 %

## 2017-10-11 DIAGNOSIS — Z95.1 S/P CABG (CORONARY ARTERY BYPASS GRAFT): Primary | ICD-10-CM

## 2017-10-11 PROBLEM — I25.10 CORONARY ARTERIOSCLEROSIS: Status: RESOLVED | Noted: 2017-10-06 | Resolved: 2017-10-11

## 2017-10-11 PROBLEM — I25.10 CAD (CORONARY ARTERY DISEASE), NATIVE CORONARY ARTERY: Status: RESOLVED | Noted: 2017-10-02 | Resolved: 2017-10-11

## 2017-10-11 LAB
ANION GAP SERPL CALC-SCNC: 12 MMOL/L
BASOPHILS # BLD AUTO: 0.01 K/UL
BASOPHILS NFR BLD: 0.1 %
BUN SERPL-MCNC: 21 MG/DL
CALCIUM SERPL-MCNC: 10.2 MG/DL
CHLORIDE SERPL-SCNC: 95 MMOL/L
CO2 SERPL-SCNC: 30 MMOL/L
CREAT SERPL-MCNC: 1.2 MG/DL
DIFFERENTIAL METHOD: ABNORMAL
EOSINOPHIL # BLD AUTO: 0.4 K/UL
EOSINOPHIL NFR BLD: 5.8 %
ERYTHROCYTE [DISTWIDTH] IN BLOOD BY AUTOMATED COUNT: 13 %
EST. GFR  (AFRICAN AMERICAN): >60 ML/MIN/1.73 M^2
EST. GFR  (NON AFRICAN AMERICAN): >60 ML/MIN/1.73 M^2
GLUCOSE SERPL-MCNC: 100 MG/DL
HCT VFR BLD AUTO: 31.5 %
HGB BLD-MCNC: 11 G/DL
LYMPHOCYTES # BLD AUTO: 1.6 K/UL
LYMPHOCYTES NFR BLD: 22.1 %
MAGNESIUM SERPL-MCNC: 2.3 MG/DL
MCH RBC QN AUTO: 29.8 PG
MCHC RBC AUTO-ENTMCNC: 34.9 G/DL
MCV RBC AUTO: 85 FL
MONOCYTES # BLD AUTO: 1.1 K/UL
MONOCYTES NFR BLD: 15.5 %
NEUTROPHILS # BLD AUTO: 4 K/UL
NEUTROPHILS NFR BLD: 56.2 %
PHOSPHATE SERPL-MCNC: 4.6 MG/DL
PLATELET # BLD AUTO: 287 K/UL
PMV BLD AUTO: 8.6 FL
POTASSIUM SERPL-SCNC: 4.1 MMOL/L
RBC # BLD AUTO: 3.69 M/UL
SODIUM SERPL-SCNC: 137 MMOL/L
WBC # BLD AUTO: 7.11 K/UL

## 2017-10-11 PROCEDURE — 97802 MEDICAL NUTRITION INDIV IN: CPT | Performed by: DIETITIAN, REGISTERED

## 2017-10-11 PROCEDURE — 25000003 PHARM REV CODE 250: Performed by: STUDENT IN AN ORGANIZED HEALTH CARE EDUCATION/TRAINING PROGRAM

## 2017-10-11 PROCEDURE — 97116 GAIT TRAINING THERAPY: CPT

## 2017-10-11 PROCEDURE — 80048 BASIC METABOLIC PNL TOTAL CA: CPT

## 2017-10-11 PROCEDURE — 25000003 PHARM REV CODE 250: Performed by: THORACIC SURGERY (CARDIOTHORACIC VASCULAR SURGERY)

## 2017-10-11 PROCEDURE — 36415 COLL VENOUS BLD VENIPUNCTURE: CPT

## 2017-10-11 PROCEDURE — 84100 ASSAY OF PHOSPHORUS: CPT

## 2017-10-11 PROCEDURE — 83735 ASSAY OF MAGNESIUM: CPT

## 2017-10-11 PROCEDURE — 63600175 PHARM REV CODE 636 W HCPCS: Performed by: STUDENT IN AN ORGANIZED HEALTH CARE EDUCATION/TRAINING PROGRAM

## 2017-10-11 PROCEDURE — 25000003 PHARM REV CODE 250: Performed by: NURSE PRACTITIONER

## 2017-10-11 PROCEDURE — 85025 COMPLETE CBC W/AUTO DIFF WBC: CPT

## 2017-10-11 RX ORDER — FUROSEMIDE 20 MG/1
20 TABLET ORAL 2 TIMES DAILY
Qty: 60 TABLET | Refills: 0 | Status: SHIPPED | OUTPATIENT
Start: 2017-10-11 | End: 2017-11-06 | Stop reason: ALTCHOICE

## 2017-10-11 RX ORDER — ASPIRIN 81 MG/1
81 TABLET ORAL DAILY
Refills: 0 | COMMUNITY
Start: 2017-10-12 | End: 2024-03-19

## 2017-10-11 RX ORDER — SIMVASTATIN 40 MG/1
40 TABLET, FILM COATED ORAL NIGHTLY
Qty: 90 TABLET | Refills: 3 | Status: SHIPPED | OUTPATIENT
Start: 2017-10-11 | End: 2017-11-10 | Stop reason: ALTCHOICE

## 2017-10-11 RX ORDER — POTASSIUM CHLORIDE 20 MEQ/1
20 TABLET, EXTENDED RELEASE ORAL 2 TIMES DAILY
Qty: 60 TABLET | Refills: 0 | Status: SHIPPED | OUTPATIENT
Start: 2017-10-11 | End: 2017-11-06 | Stop reason: ALTCHOICE

## 2017-10-11 RX ORDER — METOPROLOL TARTRATE 100 MG/1
100 TABLET ORAL 2 TIMES DAILY
Qty: 60 TABLET | Refills: 4 | Status: SHIPPED | OUTPATIENT
Start: 2017-10-11 | End: 2017-11-10 | Stop reason: DRUGHIGH

## 2017-10-11 RX ORDER — POLYETHYLENE GLYCOL 3350 17 G/17G
17 POWDER, FOR SOLUTION ORAL DAILY
Qty: 10 PACKET | Refills: 0 | Status: SHIPPED | OUTPATIENT
Start: 2017-10-12 | End: 2017-11-06 | Stop reason: ALTCHOICE

## 2017-10-11 RX ORDER — OXYCODONE HYDROCHLORIDE 5 MG/1
5 TABLET ORAL EVERY 6 HOURS PRN
Qty: 60 TABLET | Refills: 0 | Status: SHIPPED | OUTPATIENT
Start: 2017-10-11 | End: 2017-11-06 | Stop reason: ALTCHOICE

## 2017-10-11 RX ADMIN — TICAGRELOR 90 MG: 90 TABLET ORAL at 08:10

## 2017-10-11 RX ADMIN — ASPIRIN 81 MG: 81 TABLET, COATED ORAL at 08:10

## 2017-10-11 RX ADMIN — DOCUSATE SODIUM 50 MG: 50 CAPSULE, LIQUID FILLED ORAL at 08:10

## 2017-10-11 RX ADMIN — POTASSIUM CHLORIDE 20 MEQ: 1500 TABLET, EXTENDED RELEASE ORAL at 08:10

## 2017-10-11 RX ADMIN — FUROSEMIDE 40 MG: 10 INJECTION, SOLUTION INTRAVENOUS at 08:10

## 2017-10-11 RX ADMIN — OXYCODONE HYDROCHLORIDE 5 MG: 5 TABLET ORAL at 10:10

## 2017-10-11 RX ADMIN — METOPROLOL TARTRATE 100 MG: 50 TABLET, FILM COATED ORAL at 08:10

## 2017-10-11 RX ADMIN — POLYETHYLENE GLYCOL 3350 17 G: 17 POWDER, FOR SOLUTION ORAL at 08:10

## 2017-10-11 NOTE — PLAN OF CARE
Problem: Patient Care Overview  Goal: Plan of Care Review  Outcome: Ongoing (interventions implemented as appropriate)  Plan of care discussed with patient.  Patient ambulating with a one person assist, fall precautions in place.Continuing to encourage sternal precautions, IS, and ambulation. Patient has no complaints of pain. Discussed medications and care, Patient and patient's wife are exteremly detail orientated and have many concerns about being discharged. Concerns addressed with patient and family. Patient has no questions at this time. Will continue to monitor.

## 2017-10-11 NOTE — CONSULTS
Consult received regarding pt request to see dietitian. Pt, wife, and family member present at time of visit. Spoke extensively with pt's wife about cardiac diet and TLC lifestyle changes and provided handouts. Pt and wife very motivated to make dietary changes. Expect good compliance. RD to f/u.

## 2017-10-11 NOTE — PROGRESS NOTES
Patient ambulated one block of unit (400 feet). Bre TURCIOS asked to ambulate twice more around block (800 feet) and then he will possibly be ready for discharge. Patient ambulated the full 800 feet with no issues, no pain, and no complaints of SOB. Will continue to monitor

## 2017-10-11 NOTE — DISCHARGE SUMMARY
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 Callahan, for 1-2 months of intermittent bilateral chest pain with moderate to strenuous exercise while doing an intense workout regimen, the P90X program. The chest pain was always alleviated by rest. He had a prior episode of chest pain with exertion and vertigo for which he went to the ED, had an EKG, and was d/c-ed home. Today, on the treadmill test, he reached goal HR and BP and started having chest pain after 9 minutes, with ST depressions in inferolateral leads for 15-18 mins, and chest pain resolved after 18 minutes of rest. No nitro given. 2D Echo with CFD showed a normal heart with LVEF 65, normal diastolic function, mildly elevated PA pressure of 26 mmHg.       Echo fellow, Dr Spencer Buitrago, called Interventional Cardiology for cath and called [facility] for admission.      He is a never smoker, does not drink EtOH, no illicits.   Fam Hx: F -  of Mi at 55, M - PAD/carotid stents, GF  of MI at age 85     He went emergently to cath lab, found to have multivessel CAD so no PCI done await CTS consult. No complaints on admit. Never had chest pain at rest, only with exertion. No palpitations, syncope, f/c, or other sx.     Procedure(s) (LRB):  AORTOCORONARY BYPASS-CABG/CABGX3 (N/A)  HARVEST-VEIN-ENDOVASCULAR (Left) "      Indwelling Lines/Drains at time of discharge:   Lines/Drains/Airways          No matching active lines, drains, or airways        Hospital Course:   On 10/6/17, the patient was taken to the Operating Room for  the above stated procedure. Please see the previously dictated operative report for complete details. Postoperatively, the patient was taken from the  Operating Room to the ICU where the vital signs were monitored and pain was kept under control. The patient was weaned from the drips and extubated in the ICU per protocol. Once hemodynamically stable, the patient was transferred to the Cardiac Step-Down floor for continued strengthening and ambulation. On postoperative day 5, the patient was ready for discharge to home. At the time of discharge, the patient was ambulating unassisted. Pain was well controlled with oral analgesics and the patient was tolerating the diet.     MOBILITY AND ACTIVITY: As tolerated. Patient may shower. No heavy lifting of   greater than 5 pounds and no driving.     DIET: An 1800-calorie ADA with a 1500 mL fluid restriction.     WOUND CARE INSTRUCTIONS: Check for redness, swelling and drainage around the incision or wound. Patient is to call for any obvious bleeding, drainage, pus from the wound, unusual problems or difficulties or temperature of greater than 101   degrees.     FOLLOWUP: Follow up with Dr. Mejia in approximately 3 weeks. Prior to this   appointment, the patient will have a chest x-ray and EKG.     Pt was not placed on an ace inhibitor at the time of discharge due to the potential for hypotension.     DISCHARGE CONDITION: At the time of discharge, the patient was in sinus rhythm and afebrile with stable vital signs.      Consults         Status Ordering Provider     Consult to Dietary  Once     Provider:  (Not yet assigned)    Completed INEZ MACIEL     Consult to Endocrinology  Once     Provider:  (Not yet assigned)    Completed INEZ MACIEL      Inpatient consult to Cardiac Rehab  Once     Provider:  (Not yet assigned)    Completed AURORA HICKS     Inpatient consult to Cardiothoracic Surgery  Once     Provider:  (Not yet assigned)    Completed TREY FERRELL     Inpatient consult to Dietary  Once     Provider:  (Not yet assigned)    Completed AURORA HICKS     Inpatient consult to Dietary  Once     Provider:  (Not yet assigned)    Completed JOE MEJIA     Inpatient consult to Interventional Cardiology  Once     Provider:  (Not yet assigned)    Completed NATHALIE RAE          Significant Diagnostic Studies: Labs:   BMP:   Recent Labs  Lab 10/10/17  0345 10/10/17  0753 10/11/17  0416   GLU  --   --  100   NA  --   --  137   K 3.9 4.1 4.1   CL  --   --  95   CO2  --   --  30*   BUN  --   --  21*   CREATININE  --   --  1.2   CALCIUM  --   --  10.2   MG 2.4 2.6 2.3       Pending Diagnostic Studies:     None        Final Active Diagnoses:    Diagnosis Date Noted POA    S/P CABG x 3 [Z95.1] 10/06/2017 Not Applicable    Positive cardiac stress test [R94.39] 10/02/2017 Yes    Acute angina [I20.9] 10/02/2017 Yes    Mixed hyperlipidemia [E78.2] 10/20/2016 Yes      Problems Resolved During this Admission:    Diagnosis Date Noted Date Resolved POA    PRINCIPAL PROBLEM:  CAD (coronary artery disease), native coronary artery [I25.10] 10/02/2017 10/11/2017 Yes    Coronary arteriosclerosis [I25.10] 10/06/2017 10/11/2017 Yes    Stress hyperglycemia [R73.9] 10/06/2017 10/10/2017 No    Pre-op evaluation [Z01.818]  10/06/2017 Not Applicable      Discharged Condition: good    Disposition: home with wife    Follow Up:  Follow-up Information     Joe Mejia MD In 3 weeks.    Specialties:  Cardiothoracic Surgery, Transplant  Contact information:  [address] [place] [address] [phone]                 Patient Instructions:   No discharge procedures on file.  Medications:  Reconciled Home Medications:   Current Discharge Medication  List      START taking these medications    Details   aspirin (ECOTRIN) 81 MG EC tablet Take 1 tablet (81 mg total) by mouth once daily.  Refills: 0      docusate sodium (COLACE) 50 MG capsule Take 1 capsule (50 mg total) by mouth once daily.  Refills: 0      furosemide (LASIX) 20 MG tablet Take 1 tablet (20 mg total) by mouth 2 (two) times daily. Take one tablet by mouth twice daily for 7 days then decrease to once daily  Qty: 60 tablet, Refills: 0      metoprolol tartrate (LOPRESSOR) 100 MG tablet Take 1 tablet (100 mg total) by mouth 2 (two) times daily.  Qty: 60 tablet, Refills: 4      oxycodone (ROXICODONE) 5 MG immediate release tablet Take 1 tablet (5 mg total) by mouth every 6 (six) hours as needed.  Qty: 60 tablet, Refills: 0      polyethylene glycol (GLYCOLAX) 17 gram PwPk Take 17 g by mouth once daily.  Qty: 10 packet, Refills: 0      potassium chloride SA (K-DUR,KLOR-CON) 20 MEQ tablet Take 1 tablet (20 mEq total) by mouth 2 (two) times daily.  Qty: 60 tablet, Refills: 0      ticagrelor (BRILINTA) 90 mg tablet Take 1 tablet (90 mg total) by mouth 2 (two) times daily.  Qty: 60 tablet, Refills: 11         CONTINUE these medications which have CHANGED    Details   simvastatin (ZOCOR) 40 MG tablet Take 1 tablet (40 mg total) by mouth every evening.  Qty: 90 tablet, Refills: 3           Time spent on the discharge of patient: 35 minutes    Bre Carter NP  Cardiothoracic Surgery  [facility]

## 2017-10-11 NOTE — PLAN OF CARE
Problem: Physical Therapy Goal  Goal: Physical Therapy Goal  Goals to be met by: 10/18/17     Patient will increase functional independence with mobility by performin. Supine to sit with Supervision - not met  2. Sit to stand transfer with Supervision - not met  3. Gait  x 400 feet with Supervision. -  Met 10/11/17  4. Ascend/descend 2 stair without Handrails Stand-by Assistance. -  Met 10/11/17  5. Lower extremity exercise program x15 reps, with supervision, in order to increase LE strength and (I) with functional mobility. - not met      Goals updated for content and are appropriate. Annika Jackson, PT 10/11/2017

## 2017-10-11 NOTE — PROGRESS NOTES
"Patient complaining of "back pain over right kidney", concerned he is passing kidney stone and wanted to make doctors aware. Bre TURCIOS notified. No interventions at this time, will continue to monitor.  "

## 2017-10-11 NOTE — PT/OT/SLP PROGRESS
Physical Therapy  Treatment    Fausto Patterson   MRN: [id number]   Admitting Diagnosis: CAD (coronary artery disease), native coronary artery    PT Received On: 10/11/17  PT Start Time: 0751     PT Stop Time: 0817    PT Total Time (min): 26 min       Billable Minutes:  Gait [id number] min    Treatment Type: Treatment  PT/PTA: PT     PTA Visit Number: 0       General Precautions: Standard, fall, sternal  Orthopedic Precautions: N/A   Braces:      Do you have any cultural, spiritual, Nondenominational conflicts, given your current situation?: none noted     Subjective:  Communicated with nurse prior to session.      Pain/Comfort  Pain Rating 1: 4/10  Location - Side 1: Left  Location 1:  (chest)  Pain Rating Post-Intervention 1: 4/10    Objective:   Patient found with: telemetry (hep lock IV)    Functional Mobility:  Bed Mobility:   Rolling/Turning to Left: Stand by assistance (pt needed verbal cues for functional mobility with sternal precautions.  pt performed bed mobility x 2 reps. )  Supine to Sit: Minimum Assistance  Sit to Supine: Stand by Assistance    Transfers:  Sit <> Stand Assistance: Contact Guard Assistance  Sit <> Stand Assistive Device: No Assistive Device    Gait:   Gait Distance: 450 ft  Assistance 1: Supervision  Gait Assistive Device: No device  Gait Pattern: 2-point gait      Therapeutic Activities and Exercises:  Pt received stair training up/down 8 steps with L handrail for balance and supervision.  Pt and wife received verbal instruction in post-op mobility for discharge. All questions were answered.     AM-PAC 6 CLICK MOBILITY  How much help from another person does this patient currently need?   1 = Unable, Total/Dependent Assistance  2 = A lot, Maximum/Moderate Assistance  3 = A little, Minimum/Contact Guard/Supervision  4 = None, Modified [place]/Independent    Turning over in bed (including adjusting bedclothes, sheets and blankets)?: 4  Sitting down on and standing up from a chair with arms (e.g.,  wheelchair, bedside commode, etc.): 3  Moving from lying on back to sitting on the side of the bed?: 3  Moving to and from a bed to a chair (including a wheelchair)?: 4  Need to walk in hospital room?: 4  Climbing 3-5 steps with a railing?: 4  Total Score: 22    AM-PAC Raw Score CMS G-Code Modifier Level of Impairment Assistance   6 % Total / Unable   7 - 9 CM 80 - 100% Maximal Assist   10 - 14 CL 60 - 80% Moderate Assist   15 - 19 CK 40 - 60% Moderate Assist   20 - 22 CJ 20 - 40% Minimal Assist   23 CI 1-20% SBA / CGA   24 CH 0% Independent/ Mod I     Patient left up in chair with all lines intact, call button in reach and wife and mother present.    Assessment:  Fausto Patterson is a 47 y.o. male with a medical diagnosis of CAD (coronary artery disease), native coronary artery and presents with decreased mobility, transfers and decreased distance ambulated. Pt would benefit from cont PT to address deficits and will be able to discharge home with no PT or DME needs. Pt will benefit from skilled PT 4x/wk to return pt to Independent status. Pt is s/p CABG 10/6/17.    Rehab identified problem list/impairments: Rehab identified problem list/impairments: impaired functional mobilty, gait instability    Rehab potential is excellent.    Activity tolerance: Excellent    Discharge recommendations: Discharge Facility/Level Of Care Needs:  (no further PT will be needed.)     Barriers to discharge: Barriers to Discharge: None    Equipment recommendations: Equipment Needed After Discharge: none     GOALS:    Physical Therapy Goals        Problem: Physical Therapy Goal    Goal Priority Disciplines Outcome Goal Variances Interventions   Physical Therapy Goal     PT/OT, PT Ongoing (interventions implemented as appropriate)     Description:  Goals to be met by: 10/18/17     Patient will increase functional independence with mobility by performin. Supine to sit with Supervision - not met  2. Sit to stand transfer with  Supervision - not met  3. Gait  x 400 feet with Supervision. -  Met 10/11/17  4. Ascend/descend 2 stair without Handrails Stand-by Assistance. -  Met 10/11/17  5. Lower extremity exercise program x15 reps, with supervision, in order to increase LE strength and (I) with functional mobility. - not met                        PLAN:    Patient to be seen 4 x/week  to address the above listed problems via gait training, therapeutic activities  Plan of Care expires: 11/06/17  Plan of Care reviewed with: patient, spouse         Annikageorgia Jackson, PT  10/11/2017

## 2017-10-11 NOTE — PROGRESS NOTES
Patient voiced concerned about Metropolol being increased from 75mg to 100mg.  Stated he had been feeling some dizziness. Bre TURCIOS notified stated that could be from Lasix so she will D/C lasix. Will continue to monitor.

## 2017-10-11 NOTE — CONSULTS
"Cardiac Rehab     Fausto Patterson   [id number]   10/11/2017       Activity taught: Yes    Cardiac Rehab Phase Taught: Phase 1 & 2    Risk Factors-Modifiable: nutrition, hyperlipidemia, hypertension, exercise    Risk Factors-Non modifiable: age, heredity, gender    Teaching Method: Written, Demonstration, Living with Heart Disease Booklet    Understanding: Yes    Response: Patient and wife verbalized understanding and questions answered. They are looking to make healthy lifestyle changes as a family.    Comments: S/P CABG. Discussed cardiac rehab and risk factor modification. Team to refer patient to [facility]. Educational materials were used in the process and given to the patient. They included "Your Guide to Living with Heart Disease", Phase Two Cardiac Rehabilitation information along with a sample Mediterranean diet.The patient expressed understanding of the teaching and expressed desire to take a role in modifying the risk factors when they return home.        "

## 2017-10-11 NOTE — PROGRESS NOTES
Patient is ready for discharge. Patient stable alert and oriented. IVs removed. No complaints of pain. Discussed discharge plan. Reviewed medications and side effects, appointments, and answered questions with patient and family. Oxycodone RX given to patient's wife.

## 2017-10-12 NOTE — PT/OT/SLP DISCHARGE
Physical Therapy Discharge Summary    Fausto Patterson  MRN: [id number]   CAD (coronary artery disease), native coronary artery   Patient Discharged from acute Physical Therapy on 10/11/17.  Please refer to prior PT noted date on 10/11/17 for functional status.     Assessment:   Patient has not met goals.  GOALS:    Physical Therapy Goals     Not on file          Multidisciplinary Problems (Resolved)        Problem: Physical Therapy Goal    Goal Priority Disciplines Outcome Goal Variances Interventions   Physical Therapy Goal   (Resolved)     PT/OT, PT Outcome(s) achieved     Description:  Goals to be met by: 10/18/17     Patient will increase functional independence with mobility by performin. Supine to sit with Supervision - not met  2. Sit to stand transfer with Supervision - not met  3. Gait  x 400 feet with Supervision. -  Met 10/11/17  4. Ascend/descend 2 stair without Handrails Stand-by Assistance. -  Met 10/11/17  5. Lower extremity exercise program x15 reps, with supervision, in order to increase LE strength and (I) with functional mobility. - not met                      Reasons for Discontinuation of Therapy Services  Transfer to alternate level of care.      Plan:  Patient Discharged to: home.

## 2017-10-13 ENCOUNTER — PATIENT OUTREACH (OUTPATIENT)
Dept: ADMINISTRATIVE | Facility: CLINIC | Age: 47
End: 2017-10-13

## 2017-10-13 NOTE — PATIENT INSTRUCTIONS
When to Call the Doctor After Bypass Surgery    Call your doctor if you have any of these symptoms:  · Angina or chest pain symptoms like those you felt before surgery (call 911)  · Fever  of 100.4?F (38?C) or higher, or as directed by your healthcare provider  · Unexplained chills or sweating  · Shortness of breath or trouble breathing  · Sharp pain in the chest on taking a deep breath  · Belly (abdominal) pain, nausea, or vomiting that doesn't go away  · Fast or irregular heartbeat  · Dizziness or fainting  · Increasing pain that doesn't get better after taking pain medicine  · Swelling, redness, oozing, or cloudy discharge at the incision sites  · Unexplained bruising or bleeding  · Continued sensation of motion or clicking sounds in your breastbone  · Sudden weight gain. This means 5 pounds or more in 1 week.  · Increased swelling of the legs, especially on the side where the vein was not removed  Date Last Reviewed: 10/1/2016  © 1079-4690 [employer]. [address] [place] [address], [place] [address]. All rights reserved. This information is not intended as a substitute for professional medical care. Always follow your healthcare professional's instructions.

## 2017-10-31 ENCOUNTER — OFFICE VISIT (OUTPATIENT)
Dept: INTERNAL MEDICINE | Facility: CLINIC | Age: 47
End: 2017-10-31
Payer: COMMERCIAL

## 2017-10-31 VITALS
DIASTOLIC BLOOD PRESSURE: 77 MMHG | HEART RATE: 80 BPM | SYSTOLIC BLOOD PRESSURE: 125 MMHG | HEIGHT: 69 IN | TEMPERATURE: 98 F | WEIGHT: 172.81 LBS | BODY MASS INDEX: 25.6 KG/M2 | OXYGEN SATURATION: 99 %

## 2017-10-31 DIAGNOSIS — I25.10 CORONARY ARTERY DISEASE INVOLVING NATIVE CORONARY ARTERY OF NATIVE HEART WITHOUT ANGINA PECTORIS: ICD-10-CM

## 2017-10-31 DIAGNOSIS — Z95.1 S/P CABG X 3: ICD-10-CM

## 2017-10-31 DIAGNOSIS — E78.2 MIXED HYPERLIPIDEMIA: ICD-10-CM

## 2017-10-31 DIAGNOSIS — Z00.00 ANNUAL PHYSICAL EXAM: Primary | ICD-10-CM

## 2017-10-31 PROBLEM — I20.9 ACUTE ANGINA: Status: RESOLVED | Noted: 2017-10-02 | Resolved: 2017-10-31

## 2017-10-31 PROBLEM — R94.39 POSITIVE CARDIAC STRESS TEST: Status: RESOLVED | Noted: 2017-10-02 | Resolved: 2017-10-31

## 2017-10-31 PROCEDURE — 99396 PREV VISIT EST AGE 40-64: CPT | Mod: S$GLB,,, | Performed by: INTERNAL MEDICINE

## 2017-10-31 PROCEDURE — 99999 PR PBB SHADOW E&M-EST. PATIENT-LVL III: CPT | Mod: PBBFAC,,, | Performed by: INTERNAL MEDICINE

## 2017-10-31 NOTE — PROGRESS NOTES
Subjective:       Patient ID: Fausto Patterson is a 47 y.o. male.    Chief Complaint: Annual Exam    HPI   D/c Summary reviewed.    1 presyncopal episode with quick activity about 10 day ago.     Lasix has been decreased to 20 m g daily.     Lung volumes have improved gradually.  Apprehensive about deep breathing.     Some ozzing at r lower scar .  Some discomfort at R leg wounds and mild edema  He will f/u with Dr Middleton.    He is taking simvastatin 40 mg.  He took atorvastatin, he believes, in hospital.  Not srue why it was stopped.  He prefers to stick with simvastatin.    He has lost 15 lbs since admission.    Review of Systems   Constitutional: Negative for activity change and unexpected weight change.   HENT: Negative for postnasal drip, rhinorrhea and sinus pressure.    Respiratory: Negative for chest tightness and shortness of breath.    Cardiovascular: Positive for chest pain and leg swelling.   Gastrointestinal: Negative for abdominal pain, nausea and vomiting.   Genitourinary: Negative for difficulty urinating, dysuria, hematuria and urgency.   Skin: Negative for rash.   Neurological: Negative for headaches.   Psychiatric/Behavioral: Negative for dysphoric mood and sleep disturbance. The patient is not nervous/anxious.        Objective:      Physical Exam   Constitutional: He is oriented to person, place, and time. He appears well-developed and well-nourished.   HENT:   Mouth/Throat: Oropharynx is clear and moist. No oropharyngeal exudate.   Tm's clear   Eyes: Right eye exhibits no discharge. Left eye exhibits no discharge. No scleral icterus.   Neck: Normal range of motion. Neck supple. No JVD present. No thyromegaly present.   Cardiovascular: Normal rate, regular rhythm and normal heart sounds.    Pulmonary/Chest: Effort normal and breath sounds normal. No respiratory distress. He has no wheezes. He has no rales.   Abdominal: Soft. He exhibits no mass. There is no tenderness.   Musculoskeletal: He  exhibits no edema.   Lymphadenopathy:     He has no cervical adenopathy.   Neurological: He is alert and oriented to person, place, and time.   Skin:   Well healing surgical wounds chest and LLE   Psychiatric: He has a normal mood and affect. His behavior is normal.       Assessment:       1. Annual physical exam    2. Mixed hyperlipidemia    3. Coronary artery disease involving native coronary artery of native heart without angina pectoris    4. S/P CABG x 3        Plan:       Fausto was seen today for annual exam.    Diagnoses and all orders for this visit:    Annual physical exam    Mixed hyperlipidemia    Coronary artery disease involving native coronary artery of native heart without angina pectoris    S/P CABG x 3       f/u cards.  We discussed changing statin to atorvastatin, but her prefers to wait til discussing with Dr Gonzalez.     Plant based diet, regular exercise.

## 2017-11-03 LAB — CORONARY STENOSIS: ABNORMAL

## 2017-11-06 ENCOUNTER — HOSPITAL ENCOUNTER (OUTPATIENT)
Dept: CARDIOLOGY | Facility: CLINIC | Age: 47
Discharge: HOME | End: 2017-11-06
Payer: COMMERCIAL

## 2017-11-06 ENCOUNTER — OFFICE VISIT (OUTPATIENT)
Dept: CARDIOTHORACIC SURGERY | Facility: CLINIC | Age: 47
End: 2017-11-06
Payer: COMMERCIAL

## 2017-11-06 ENCOUNTER — HOSPITAL ENCOUNTER (OUTPATIENT)
Dept: RADIOLOGY | Facility: HOSPITAL | Age: 47
Discharge: HOME | End: 2017-11-06
Attending: THORACIC SURGERY (CARDIOTHORACIC VASCULAR SURGERY)
Payer: COMMERCIAL

## 2017-11-06 VITALS
OXYGEN SATURATION: 100 % | HEIGHT: 69 IN | DIASTOLIC BLOOD PRESSURE: 80 MMHG | BODY MASS INDEX: 25.57 KG/M2 | HEART RATE: 75 BPM | SYSTOLIC BLOOD PRESSURE: 135 MMHG | WEIGHT: 172.63 LBS | TEMPERATURE: 98 F

## 2017-11-06 DIAGNOSIS — Z95.1 S/P CABG (CORONARY ARTERY BYPASS GRAFT): ICD-10-CM

## 2017-11-06 DIAGNOSIS — Z95.1 S/P CABG (CORONARY ARTERY BYPASS GRAFT): Primary | ICD-10-CM

## 2017-11-06 PROCEDURE — 71020 XR CHEST PA AND LATERAL: CPT | Mod: TC

## 2017-11-06 PROCEDURE — 99999 PR PBB SHADOW E&M-EST. PATIENT-LVL III: CPT | Mod: PBBFAC,,, | Performed by: THORACIC SURGERY (CARDIOTHORACIC VASCULAR SURGERY)

## 2017-11-06 PROCEDURE — 71020 XR CHEST PA AND LATERAL: CPT | Mod: 26,,, | Performed by: RADIOLOGY

## 2017-11-06 PROCEDURE — 99024 POSTOP FOLLOW-UP VISIT: CPT | Mod: S$GLB,,, | Performed by: THORACIC SURGERY (CARDIOTHORACIC VASCULAR SURGERY)

## 2017-11-06 PROCEDURE — 93000 ELECTROCARDIOGRAM COMPLETE: CPT | Mod: S$GLB,,, | Performed by: INTERNAL MEDICINE

## 2017-11-06 NOTE — PROGRESS NOTES
Patient seen and examined. Patient is progressively increasing activity. No significant complaints.     Sternum: stable, incision CDI  Chest xray: Acceptable post op chest  EKG: NSR     Assessment:  Coronary artery bypass grafting x3 using bilateral mammary arteries   in skeletonized fashion, left internal mammary artery to LAD, right mammary   artery to distal RCA, SVG to OM1.  Endoscopic vein harvest of the saphenous vein   graft from the left lower extremity.     Plan:  Can begin driving as long as he has power steering  Can begin cardiac rehab in the next couple of weeks  We will refer to cardiology to assume care- seeing Dr. Ennis on Friday  TARA lasjesse PACHECO potassium        No scheduled appointment, RTC prn

## 2017-11-07 ENCOUNTER — DOCUMENTATION ONLY (OUTPATIENT)
Dept: CARDIOTHORACIC SURGERY | Facility: CLINIC | Age: 47
End: 2017-11-07

## 2017-11-07 DIAGNOSIS — Z95.1 S/P CABG (CORONARY ARTERY BYPASS GRAFT): Primary | ICD-10-CM

## 2017-11-08 DIAGNOSIS — I25.10 CORONARY ARTERY DISEASE, ANGINA PRESENCE UNSPECIFIED, UNSPECIFIED VESSEL OR LESION TYPE, UNSPECIFIED WHETHER NATIVE OR TRANSPLANTED HEART: Primary | ICD-10-CM

## 2017-11-10 ENCOUNTER — HOSPITAL ENCOUNTER (OUTPATIENT)
Dept: CARDIOLOGY | Facility: CLINIC | Age: 47
Discharge: HOME | End: 2017-11-10
Payer: COMMERCIAL

## 2017-11-10 ENCOUNTER — TELEPHONE (OUTPATIENT)
Dept: CARDIAC REHAB | Facility: CLINIC | Age: 47
End: 2017-11-10

## 2017-11-10 ENCOUNTER — LAB VISIT (OUTPATIENT)
Dept: LAB | Facility: HOSPITAL | Age: 47
End: 2017-11-10
Attending: INTERNAL MEDICINE
Payer: COMMERCIAL

## 2017-11-10 ENCOUNTER — OFFICE VISIT (OUTPATIENT)
Dept: ELECTROPHYSIOLOGY | Facility: CLINIC | Age: 47
End: 2017-11-10
Payer: COMMERCIAL

## 2017-11-10 VITALS
SYSTOLIC BLOOD PRESSURE: 114 MMHG | DIASTOLIC BLOOD PRESSURE: 75 MMHG | BODY MASS INDEX: 25.73 KG/M2 | WEIGHT: 173.75 LBS | HEIGHT: 69 IN | HEART RATE: 67 BPM

## 2017-11-10 DIAGNOSIS — Z95.1 S/P CABG X 3: ICD-10-CM

## 2017-11-10 DIAGNOSIS — I25.10 CORONARY ARTERY DISEASE INVOLVING NATIVE CORONARY ARTERY OF NATIVE HEART WITHOUT ANGINA PECTORIS: ICD-10-CM

## 2017-11-10 DIAGNOSIS — K22.2 SCHATZKI'S RING: ICD-10-CM

## 2017-11-10 DIAGNOSIS — I25.10 CORONARY ARTERY DISEASE, ANGINA PRESENCE UNSPECIFIED, UNSPECIFIED VESSEL OR LESION TYPE, UNSPECIFIED WHETHER NATIVE OR TRANSPLANTED HEART: ICD-10-CM

## 2017-11-10 DIAGNOSIS — E78.2 MIXED HYPERLIPIDEMIA: Primary | ICD-10-CM

## 2017-11-10 LAB
BASOPHILS # BLD AUTO: 0.05 K/UL
BASOPHILS NFR BLD: 0.7 %
DIFFERENTIAL METHOD: ABNORMAL
EOSINOPHIL # BLD AUTO: 0.4 K/UL
EOSINOPHIL NFR BLD: 5.5 %
ERYTHROCYTE [DISTWIDTH] IN BLOOD BY AUTOMATED COUNT: 13.4 %
HCT VFR BLD AUTO: 41.1 %
HGB BLD-MCNC: 13.5 G/DL
IMM GRANULOCYTES # BLD AUTO: 0.02 K/UL
IMM GRANULOCYTES NFR BLD AUTO: 0.3 %
LYMPHOCYTES # BLD AUTO: 1.6 K/UL
LYMPHOCYTES NFR BLD: 22.5 %
MCH RBC QN AUTO: 28.8 PG
MCHC RBC AUTO-ENTMCNC: 32.8 G/DL
MCV RBC AUTO: 88 FL
MONOCYTES # BLD AUTO: 0.7 K/UL
MONOCYTES NFR BLD: 9.5 %
NEUTROPHILS # BLD AUTO: 4.5 K/UL
NEUTROPHILS NFR BLD: 61.5 %
NRBC BLD-RTO: 0 /100 WBC
PLATELET # BLD AUTO: 297 K/UL
PMV BLD AUTO: 8.6 FL
RBC # BLD AUTO: 4.68 M/UL
WBC # BLD AUTO: 7.29 K/UL

## 2017-11-10 PROCEDURE — 85025 COMPLETE CBC W/AUTO DIFF WBC: CPT

## 2017-11-10 PROCEDURE — 99205 OFFICE O/P NEW HI 60 MIN: CPT | Mod: S$GLB,,, | Performed by: INTERNAL MEDICINE

## 2017-11-10 PROCEDURE — 93000 ELECTROCARDIOGRAM COMPLETE: CPT | Mod: S$GLB,,, | Performed by: INTERNAL MEDICINE

## 2017-11-10 PROCEDURE — 99999 PR PBB SHADOW E&M-EST. PATIENT-LVL III: CPT | Mod: PBBFAC,,, | Performed by: INTERNAL MEDICINE

## 2017-11-10 PROCEDURE — 36415 COLL VENOUS BLD VENIPUNCTURE: CPT

## 2017-11-10 RX ORDER — ROSUVASTATIN CALCIUM 40 MG/1
40 TABLET, COATED ORAL DAILY
Qty: 90 TABLET | Refills: 3 | Status: SHIPPED | OUTPATIENT
Start: 2017-11-10 | End: 2018-10-31 | Stop reason: SDUPTHER

## 2017-11-10 RX ORDER — METOPROLOL SUCCINATE 100 MG/1
100 TABLET, EXTENDED RELEASE ORAL DAILY
Qty: 90 TABLET | Refills: 3 | Status: SHIPPED | OUTPATIENT
Start: 2017-11-10 | End: 2018-04-26 | Stop reason: SDUPTHER

## 2017-11-10 NOTE — PROGRESS NOTES
Subjective:   Patient ID:  Fausto Patterson is a 47 y.o. male     Chief complaint:Coronary Artery Disease      HPI  Background as recorded in my last note ( ):  Update since then:  Current Outpatient Prescriptions   Medication Sig    aspirin (ECOTRIN) 81 MG EC tablet Take 1 tablet (81 mg total) by mouth once daily.    ticagrelor (BRILINTA) 90 mg tablet Take 1 tablet (90 mg total) by mouth 2 (two) times daily.    metoprolol succinate (TOPROL-XL) 100 MG 24 hr tablet Take 1 tablet (100 mg total) by mouth once daily.    rosuvastatin (CRESTOR) 40 MG Tab Take 1 tablet (40 mg total) by mouth once daily.     No current facility-administered medications for this visit.      Review of Systems   Constitution: Negative for decreased appetite, weakness, malaise/fatigue, weight gain and weight loss.   Eyes: Negative for blurred vision.   Cardiovascular: Positive for chest pain, leg swelling and near-syncope. Negative for claudication, cyanosis, dyspnea on exertion, irregular heartbeat, orthopnea and palpitations.        Chest discomfort   Respiratory: Negative for cough, shortness of breath, sleep disturbances due to breathing, snoring and wheezing.    Endocrine: Negative for heat intolerance.   Hematologic/Lymphatic: Does not bruise/bleed easily.   Musculoskeletal: Negative for muscle weakness and myalgias.   Gastrointestinal: Positive for bloating and abdominal pain. Negative for melena, nausea and vomiting.   Genitourinary: Negative for nocturia.   Neurological: Negative for excessive daytime sleepiness, dizziness, headaches and light-headedness.   Psychiatric/Behavioral: Negative for depression, memory loss and substance abuse. The patient has insomnia. The patient is not nervous/anxious.        Objective:   Physical Exam   Constitutional: He is oriented to person, place, and time. He appears well-developed and well-nourished.   HENT:   Head: Normocephalic and atraumatic.   Right Ear: External ear normal.   Left Ear:  External ear normal.   Eyes: Conjunctivae are normal. Pupils are equal, round, and reactive to light. Right eye exhibits no discharge. Left eye exhibits no discharge. Right conjunctiva is not injected. Left conjunctiva has no hemorrhage.   Neck: Neck supple. No JVD present. No thyromegaly present.   Cardiovascular: Normal rate, regular rhythm, normal heart sounds and intact distal pulses.  PMI is not displaced.  Exam reveals no gallop, no friction rub, no midsystolic click and no opening snap.    No murmur heard.  Pulses:       Carotid pulses are 2+ on the right side, and 2+ on the left side.       Radial pulses are 2+ on the right side, and 2+ on the left side.        Dorsalis pedis pulses are 2+ on the right side, and 2+ on the left side.        Posterior tibial pulses are 2+ on the right side, and 2+ on the left side.   Pulmonary/Chest: Effort normal and breath sounds normal. No respiratory distress. He has no wheezes. He has no rales. He exhibits no tenderness.   Abdominal: Soft. Normal appearance. He exhibits no pulsatile liver. There is no hepatomegaly. There is no tenderness. There is no rebound and no guarding.   Musculoskeletal: Normal range of motion. He exhibits no edema or tenderness.        Right knee: He exhibits no swelling.        Left knee: He exhibits no swelling.        Right ankle: He exhibits no swelling.        Left ankle: He exhibits no swelling.        Right lower leg: He exhibits no swelling.        Left lower leg: He exhibits no swelling.        Right foot: There is no swelling.        Left foot: There is no swelling.   Neurological: He is alert and oriented to person, place, and time. He has normal strength and normal reflexes. No cranial nerve deficit. Coordination normal.   Skin: Skin is warm, dry and intact. No rash noted. He is not diaphoretic. No cyanosis.   Psychiatric: He has a normal mood and affect. His behavior is normal.   Nursing note and vitals reviewed.    /75   Pulse  "67   Ht 5' 9" (1.753 m)   Wt 78.8 kg (173 lb 11.6 oz)   BMI 25.65 kg/m²      Assessment:      1. Mixed hyperlipidemia    2. Coronary artery disease involving native coronary artery of native heart without angina pectoris    3. S/P CABG x 3    4. Schatzki's ring        Plan:      Orders Placed This Encounter   Procedures    Lipid panel     Standing Status:   Future     Standing Expiration Date:   1/9/2019    CBC auto differential     Standing Status:   Future     Standing Expiration Date:   1/9/2019     Return in about 3 months (around 2/10/2018), or if symptoms worsen or fail to improve.  Medications Discontinued During This Encounter   Medication Reason    simvastatin (ZOCOR) 40 MG tablet Alternate therapy    metoprolol tartrate (LOPRESSOR) 100 MG tablet Dose adjustment     New Prescriptions    METOPROLOL SUCCINATE (TOPROL-XL) 100 MG 24 HR TABLET    Take 1 tablet (100 mg total) by mouth once daily.    ROSUVASTATIN (CRESTOR) 40 MG TAB    Take 1 tablet (40 mg total) by mouth once daily.     Modified Medications    No medications on file              "

## 2017-11-13 ENCOUNTER — TELEPHONE (OUTPATIENT)
Dept: CARDIAC REHAB | Facility: CLINIC | Age: 47
End: 2017-11-13

## 2017-11-17 DIAGNOSIS — I25.10 CORONARY ATHEROSCLEROSIS OF NATIVE CORONARY ARTERY: ICD-10-CM

## 2017-11-17 DIAGNOSIS — Z95.1 POSTSURGICAL AORTOCORONARY BYPASS STATUS: Primary | ICD-10-CM

## 2017-11-21 ENCOUNTER — HOSPITAL ENCOUNTER (OUTPATIENT)
Dept: CARDIOLOGY | Facility: CLINIC | Age: 47
Discharge: HOME | End: 2017-11-21
Attending: THORACIC SURGERY (CARDIOTHORACIC VASCULAR SURGERY)
Payer: COMMERCIAL

## 2017-11-21 DIAGNOSIS — Z95.1 POSTSURGICAL AORTOCORONARY BYPASS STATUS: ICD-10-CM

## 2017-11-21 DIAGNOSIS — I25.10 CORONARY ATHEROSCLEROSIS OF NATIVE CORONARY ARTERY: ICD-10-CM

## 2017-11-21 LAB — DIASTOLIC DYSFUNCTION: NO

## 2017-11-21 PROCEDURE — 94621 CARDIOPULM EXERCISE TESTING: CPT | Mod: S$GLB,,, | Performed by: INTERNAL MEDICINE

## 2017-11-28 ENCOUNTER — CLINICAL SUPPORT (OUTPATIENT)
Dept: CARDIAC REHAB | Facility: CLINIC | Age: 47
End: 2017-11-28
Payer: COMMERCIAL

## 2017-11-28 VITALS
SYSTOLIC BLOOD PRESSURE: 138 MMHG | OXYGEN SATURATION: 100 % | DIASTOLIC BLOOD PRESSURE: 94 MMHG | RESPIRATION RATE: 18 BRPM | HEART RATE: 83 BPM

## 2017-11-28 DIAGNOSIS — I25.10 ATHEROSCLEROSIS OF NATIVE CORONARY ARTERY OF NATIVE HEART WITHOUT ANGINA PECTORIS: ICD-10-CM

## 2017-11-28 DIAGNOSIS — Z95.1 S/P CABG (CORONARY ARTERY BYPASS GRAFT): ICD-10-CM

## 2017-11-28 PROCEDURE — 93798 PHYS/QHP OP CAR RHAB W/ECG: CPT | Mod: S$GLB,,, | Performed by: DIETITIAN, REGISTERED

## 2017-11-28 NOTE — PROGRESS NOTES
Patient here for Phase II Cardiac Rehab orientation.      Discussed QOL, Risk factors & goals with patient.  Head to toe nursing assessment done.  Vital signs obtained. Midsternal incision edges well approximated, no drainage noted. Bilateral breath sounds clear. Pt states he has incisional/muscular pain when taking deep breaths, will continue to monitor. Bilateral radial and pedal pulses +2. Trace edema noted to left leg and right leg no edema noted.     QOL:  Discussed Dewayne & SF36 Questionnaire scores with patient.    RISK FACTORS:  The following risk factors are present:  nutrition, hyperlipidemia, positive family history, stress, exercise    GOALS:  Patient has set the following goals:  Decrease cholesterol level  Increase exercise tolerance  Increase knowledge of CAD  Decrease blood pressure  Weight loss  ID & manage personal areas of stress  Learn more about healthy eating  Increase lung volume and physical strength    Discussed Cardiac Rehab program in depth with patient.  Medication list updated per patient & marked as reviewed.  Patient has been instructed to notify staff of any problems while attending rehab (ie: chest pain, shortness of breath, lightheadedness, dizziness).  Patient has been instructed to monitor blood pressure readings outside of rehab & to keep a daily log of the readings.  Patient verbalizes understanding.    KEVIN Shane RN  Cardiac Rehab Nurse

## 2017-11-28 NOTE — PROGRESS NOTES
Met with Fausto Patterson for orientation to Phase II cardiac rehab.      Weight: 172 lbs  BMI: 25.4  Abdominal girth: 37.5 inches  Body fat: 16.3%    Pt is comfortable at current weight.    Labs reviewed with patient. Patient confirms he is taking rosuvastatin for cholesterol control.    Lab Results   Component Value Date    CHOL 128 11/21/2017     Lab Results   Component Value Date    HDL 41 11/21/2017     Lab Results   Component Value Date    LDLCALC 53.8 (L) 11/21/2017     Lab Results   Component Value Date    TRIG 166 (H) 11/21/2017     Lab Results   Component Value Date    CHOLHDL 32.0 11/21/2017         Lab Results   Component Value Date    GLUF 103 11/21/2017     Lab Results   Component Value Date    HGBA1C 5.3 10/06/2017       Vitamins/supplements: none    Patient eats 3 meals daily. Pt prepares meals at home.  Seasons food with fresh herbs, salt free seasonings.  Denies use of a salt shaker at the table on prepared foods. Dines out 1 meal per week. No fast food. Chooses fried foods (shrimp/fish) 1x/month. Chooses fish 1x/week. Consumes vegetables daily. Beverages include water, OJ, milk, cranberry juice.  Alcohol: rarely.    3-day food record given to patient to complete and return for nutritional assessment.  Will follow Fausto Patterson while enrolled in Phase II cardiac rehab and encourage compliance to 2 gram sodium, Mediterranean style eating.      Ban Lauren MS, RD, LDN

## 2017-11-28 NOTE — PROGRESS NOTES
Exercise:  Met with the patient for orientation.  Consent forms were signed, entry CPX test results were reviewed, proper attire and shoes were discussed, and strength assessment was performed.         Entry CPX test results included weight (172 lb), abdominal girth (37.50 in), BMI (25.4), and body composition (16.3%).  An estimated MET Level of 11.0 and a Peak VO2 of 17.8ml/kg/min (5.1 actual METS) were measured.  This places the patient in the moderate risk category.  Upon exit CPX an estimated MET Level of 14.3 will be desired to achieve the goal of a 30% improvement.     Based on entry CPX test, the target heart rate range for patient is 108 to 132 bpm.  The patient will attend cardiac rehab class 3 times per week which will include both aerobic and resistance training which will be modified to fit limitations.  Aerobic exercise will be 30-60 minutes with a goal intensity of 12-15 on the RPE scale.  Resistance training will incorporate free weights 1-2 sets, 10-15 repetitions with a beginning weight of 5 to 8 pounds based on strength assessment.    There were no limitations noted by patient.  The patient stated he is currently walking on average 6 days/week for at least 25 minutes.  Patient was encouraged to continue exercising aerobically and to prepare to exercise at least two additional days per week outside of attending cardiac rehab class for a minimum of 30 minutes.  The patient stated understanding.      The patient will begin Cardiac Rehab on Monday, December 4 at 3:30pm.    Exercise prescription will be adjusted based on tolerance of exercise intensity by patient.    Jagdeep Hensley., CEP

## 2017-11-30 ENCOUNTER — TELEPHONE (OUTPATIENT)
Dept: INTERNAL MEDICINE | Facility: CLINIC | Age: 47
End: 2017-11-30

## 2017-11-30 NOTE — TELEPHONE ENCOUNTER
----- Message from Talya Elizabeth sent at 11/30/2017  2:00 PM CST -----  Contact: Gonzalo/GLENNA [phone]  Calling to check on the disability form that was faxed last month.    Please call and advise.    Thank You

## 2017-11-30 NOTE — TELEPHONE ENCOUNTER
Attempted to contact Gonzalo from [employer] no success, left voice message.

## 2017-12-04 ENCOUNTER — CLINICAL SUPPORT (OUTPATIENT)
Dept: CARDIAC REHAB | Facility: CLINIC | Age: 47
End: 2017-12-04
Payer: COMMERCIAL

## 2017-12-04 DIAGNOSIS — Z95.1 S/P CABG (CORONARY ARTERY BYPASS GRAFT): ICD-10-CM

## 2017-12-04 DIAGNOSIS — I25.10 ATHEROSCLEROSIS OF NATIVE CORONARY ARTERY OF NATIVE HEART WITHOUT ANGINA PECTORIS: ICD-10-CM

## 2017-12-04 PROCEDURE — 93798 PHYS/QHP OP CAR RHAB W/ECG: CPT | Mod: S$GLB,,, | Performed by: INTERNAL MEDICINE

## 2017-12-06 ENCOUNTER — CLINICAL SUPPORT (OUTPATIENT)
Dept: CARDIAC REHAB | Facility: CLINIC | Age: 47
End: 2017-12-06
Payer: COMMERCIAL

## 2017-12-06 DIAGNOSIS — I25.10 ATHEROSCLEROSIS OF NATIVE CORONARY ARTERY OF NATIVE HEART WITHOUT ANGINA PECTORIS: ICD-10-CM

## 2017-12-06 DIAGNOSIS — Z95.1 S/P CABG (CORONARY ARTERY BYPASS GRAFT): ICD-10-CM

## 2017-12-06 PROCEDURE — 93798 PHYS/QHP OP CAR RHAB W/ECG: CPT | Mod: S$GLB,,, | Performed by: INTERNAL MEDICINE

## 2017-12-06 NOTE — PROGRESS NOTES
Patient had elevated heart rate during exercise class and was consistently over his target heart by 5-10 beats rate during exercise. Monitor strips showed slight amount of ST depression and patient denied chest pain monitor strips showed to Dr. Davis who recommends patient may continue to exercise. Patient tolerated exercise session but had to rest and take deep breaths to lower heart rate before discharge.

## 2017-12-08 ENCOUNTER — CLINICAL SUPPORT (OUTPATIENT)
Dept: CARDIAC REHAB | Facility: CLINIC | Age: 47
End: 2017-12-08
Payer: COMMERCIAL

## 2017-12-08 DIAGNOSIS — Z95.1 POSTSURGICAL AORTOCORONARY BYPASS STATUS: ICD-10-CM

## 2017-12-08 DIAGNOSIS — I25.10 CORONARY ATHEROSCLEROSIS OF NATIVE CORONARY ARTERY: ICD-10-CM

## 2017-12-08 PROCEDURE — 93798 PHYS/QHP OP CAR RHAB W/ECG: CPT | Mod: S$GLB,,, | Performed by: INTERNAL MEDICINE

## 2017-12-11 ENCOUNTER — CLINICAL SUPPORT (OUTPATIENT)
Dept: CARDIAC REHAB | Facility: CLINIC | Age: 47
End: 2017-12-11
Payer: COMMERCIAL

## 2017-12-11 DIAGNOSIS — Z95.1 S/P CABG X 3: ICD-10-CM

## 2017-12-11 DIAGNOSIS — I25.10 CORONARY ARTERY DISEASE INVOLVING NATIVE CORONARY ARTERY OF NATIVE HEART WITHOUT ANGINA PECTORIS: ICD-10-CM

## 2017-12-11 PROCEDURE — 93798 PHYS/QHP OP CAR RHAB W/ECG: CPT | Mod: S$GLB,,, | Performed by: INTERNAL MEDICINE

## 2017-12-12 RX ORDER — FUROSEMIDE 20 MG/1
TABLET ORAL
Qty: 60 TABLET | Refills: 0 | OUTPATIENT
Start: 2017-12-12

## 2017-12-13 ENCOUNTER — CLINICAL SUPPORT (OUTPATIENT)
Dept: CARDIAC REHAB | Facility: CLINIC | Age: 47
End: 2017-12-13
Payer: COMMERCIAL

## 2017-12-13 DIAGNOSIS — I25.10 ATHEROSCLEROSIS OF NATIVE CORONARY ARTERY OF NATIVE HEART WITHOUT ANGINA PECTORIS: ICD-10-CM

## 2017-12-13 DIAGNOSIS — Z95.1 S/P CABG (CORONARY ARTERY BYPASS GRAFT): ICD-10-CM

## 2017-12-13 PROCEDURE — 93798 PHYS/QHP OP CAR RHAB W/ECG: CPT | Mod: S$GLB,,, | Performed by: INTERNAL MEDICINE

## 2017-12-15 ENCOUNTER — CLINICAL SUPPORT (OUTPATIENT)
Dept: CARDIAC REHAB | Facility: CLINIC | Age: 47
End: 2017-12-15
Payer: COMMERCIAL

## 2017-12-15 DIAGNOSIS — I25.10 CORONARY ARTERY DISEASE, ANGINA PRESENCE UNSPECIFIED, UNSPECIFIED VESSEL OR LESION TYPE, UNSPECIFIED WHETHER NATIVE OR TRANSPLANTED HEART: ICD-10-CM

## 2017-12-15 DIAGNOSIS — Z95.1 POSTSURGICAL AORTOCORONARY BYPASS STATUS: ICD-10-CM

## 2017-12-15 PROCEDURE — 93798 PHYS/QHP OP CAR RHAB W/ECG: CPT | Mod: S$GLB,,, | Performed by: INTERNAL MEDICINE

## 2017-12-18 ENCOUNTER — CLINICAL SUPPORT (OUTPATIENT)
Dept: CARDIAC REHAB | Facility: CLINIC | Age: 47
End: 2017-12-18
Payer: COMMERCIAL

## 2017-12-18 DIAGNOSIS — Z95.1 S/P CABG (CORONARY ARTERY BYPASS GRAFT): ICD-10-CM

## 2017-12-18 DIAGNOSIS — I25.10 ATHEROSCLEROSIS OF NATIVE CORONARY ARTERY OF NATIVE HEART WITHOUT ANGINA PECTORIS: ICD-10-CM

## 2017-12-18 PROCEDURE — 93798 PHYS/QHP OP CAR RHAB W/ECG: CPT | Mod: S$GLB,,, | Performed by: INTERNAL MEDICINE

## 2017-12-20 ENCOUNTER — CLINICAL SUPPORT (OUTPATIENT)
Dept: CARDIAC REHAB | Facility: CLINIC | Age: 47
End: 2017-12-20
Payer: COMMERCIAL

## 2017-12-20 DIAGNOSIS — I25.10 ATHEROSCLEROSIS OF NATIVE CORONARY ARTERY OF NATIVE HEART WITHOUT ANGINA PECTORIS: ICD-10-CM

## 2017-12-20 DIAGNOSIS — Z95.1 S/P CABG (CORONARY ARTERY BYPASS GRAFT): ICD-10-CM

## 2017-12-20 PROCEDURE — 93798 PHYS/QHP OP CAR RHAB W/ECG: CPT | Mod: S$GLB,,, | Performed by: INTERNAL MEDICINE

## 2017-12-22 ENCOUNTER — CLINICAL SUPPORT (OUTPATIENT)
Dept: CARDIAC REHAB | Facility: CLINIC | Age: 47
End: 2017-12-22
Payer: COMMERCIAL

## 2017-12-22 DIAGNOSIS — I25.10 CORONARY ARTERY DISEASE INVOLVING NATIVE CORONARY ARTERY OF NATIVE HEART WITHOUT ANGINA PECTORIS: ICD-10-CM

## 2017-12-22 DIAGNOSIS — Z95.1 S/P CABG X 3: ICD-10-CM

## 2017-12-22 PROCEDURE — 93798 PHYS/QHP OP CAR RHAB W/ECG: CPT | Mod: S$GLB,,, | Performed by: INTERNAL MEDICINE

## 2017-12-27 ENCOUNTER — PATIENT MESSAGE (OUTPATIENT)
Dept: INTERNAL MEDICINE | Facility: CLINIC | Age: 47
End: 2017-12-27

## 2017-12-27 ENCOUNTER — CLINICAL SUPPORT (OUTPATIENT)
Dept: CARDIAC REHAB | Facility: CLINIC | Age: 47
End: 2017-12-27
Payer: COMMERCIAL

## 2017-12-27 DIAGNOSIS — I25.10 CORONARY ARTERY DISEASE INVOLVING NATIVE CORONARY ARTERY OF NATIVE HEART WITHOUT ANGINA PECTORIS: ICD-10-CM

## 2017-12-27 DIAGNOSIS — Z95.1 S/P CABG X 3: ICD-10-CM

## 2017-12-27 PROCEDURE — 93798 PHYS/QHP OP CAR RHAB W/ECG: CPT | Mod: S$GLB,,, | Performed by: INTERNAL MEDICINE

## 2017-12-27 NOTE — LETTER
January 10, 2018    Fausto Patterson  [address]  [place] [address]             [facility]  [address]  [place] [address]  Phone: [phone]  Fax: [phone] To whom it may concern:    Fausto Patterson may return to work Jan 1, 2018.  Please excuse his absence due to medical illness.        If you have any questions or concerns, please don't hesitate to call.    Sincerely,        Chelsea Calalhan MD

## 2017-12-28 ENCOUNTER — PATIENT MESSAGE (OUTPATIENT)
Dept: ELECTROPHYSIOLOGY | Facility: CLINIC | Age: 47
End: 2017-12-28

## 2017-12-29 ENCOUNTER — CLINICAL SUPPORT (OUTPATIENT)
Dept: CARDIAC REHAB | Facility: CLINIC | Age: 47
End: 2017-12-29
Payer: COMMERCIAL

## 2017-12-29 DIAGNOSIS — I25.10 ATHEROSCLEROSIS OF NATIVE CORONARY ARTERY OF NATIVE HEART WITHOUT ANGINA PECTORIS: ICD-10-CM

## 2017-12-29 DIAGNOSIS — Z95.1 S/P CABG (CORONARY ARTERY BYPASS GRAFT): ICD-10-CM

## 2017-12-29 PROCEDURE — 93798 PHYS/QHP OP CAR RHAB W/ECG: CPT | Mod: S$GLB,,, | Performed by: INTERNAL MEDICINE

## 2018-01-03 ENCOUNTER — CLINICAL SUPPORT (OUTPATIENT)
Dept: CARDIAC REHAB | Facility: CLINIC | Age: 48
End: 2018-01-03
Payer: COMMERCIAL

## 2018-01-03 DIAGNOSIS — I25.10 CORONARY ARTERY DISEASE INVOLVING NATIVE CORONARY ARTERY OF NATIVE HEART WITHOUT ANGINA PECTORIS: ICD-10-CM

## 2018-01-03 DIAGNOSIS — Z95.1 S/P CABG X 3: ICD-10-CM

## 2018-01-03 PROCEDURE — 93798 PHYS/QHP OP CAR RHAB W/ECG: CPT | Mod: S$GLB,,, | Performed by: INTERNAL MEDICINE

## 2018-01-05 ENCOUNTER — CLINICAL SUPPORT (OUTPATIENT)
Dept: CARDIAC REHAB | Facility: CLINIC | Age: 48
End: 2018-01-05
Payer: COMMERCIAL

## 2018-01-05 DIAGNOSIS — I25.10 ATHEROSCLEROSIS OF NATIVE CORONARY ARTERY OF NATIVE HEART WITHOUT ANGINA PECTORIS: ICD-10-CM

## 2018-01-05 DIAGNOSIS — Z95.1 S/P CABG (CORONARY ARTERY BYPASS GRAFT): ICD-10-CM

## 2018-01-05 PROCEDURE — 93798 PHYS/QHP OP CAR RHAB W/ECG: CPT | Mod: S$GLB,,, | Performed by: INTERNAL MEDICINE

## 2018-01-08 ENCOUNTER — PATIENT MESSAGE (OUTPATIENT)
Dept: INTERNAL MEDICINE | Facility: CLINIC | Age: 48
End: 2018-01-08

## 2018-01-08 ENCOUNTER — CLINICAL SUPPORT (OUTPATIENT)
Dept: CARDIAC REHAB | Facility: CLINIC | Age: 48
End: 2018-01-08
Payer: COMMERCIAL

## 2018-01-08 DIAGNOSIS — Z95.1 S/P CABG X 3: ICD-10-CM

## 2018-01-08 DIAGNOSIS — I25.10 CORONARY ATHEROSCLEROSIS OF NATIVE CORONARY ARTERY: ICD-10-CM

## 2018-01-08 PROCEDURE — 93798 PHYS/QHP OP CAR RHAB W/ECG: CPT | Mod: S$GLB,,, | Performed by: INTERNAL MEDICINE

## 2018-01-10 ENCOUNTER — CLINICAL SUPPORT (OUTPATIENT)
Dept: CARDIAC REHAB | Facility: CLINIC | Age: 48
End: 2018-01-10
Payer: COMMERCIAL

## 2018-01-10 DIAGNOSIS — I25.10 CORONARY ARTERY DISEASE, ANGINA PRESENCE UNSPECIFIED, UNSPECIFIED VESSEL OR LESION TYPE, UNSPECIFIED WHETHER NATIVE OR TRANSPLANTED HEART: ICD-10-CM

## 2018-01-10 DIAGNOSIS — Z95.1 S/P CABG X 3: ICD-10-CM

## 2018-01-10 PROCEDURE — 93798 PHYS/QHP OP CAR RHAB W/ECG: CPT | Mod: S$GLB,,, | Performed by: INTERNAL MEDICINE

## 2018-01-12 ENCOUNTER — CLINICAL SUPPORT (OUTPATIENT)
Dept: CARDIAC REHAB | Facility: CLINIC | Age: 48
End: 2018-01-12
Payer: COMMERCIAL

## 2018-01-12 DIAGNOSIS — I25.10 ATHEROSCLEROSIS OF NATIVE CORONARY ARTERY OF NATIVE HEART WITHOUT ANGINA PECTORIS: ICD-10-CM

## 2018-01-12 DIAGNOSIS — Z95.1 S/P CABG (CORONARY ARTERY BYPASS GRAFT): ICD-10-CM

## 2018-01-12 PROCEDURE — 93798 PHYS/QHP OP CAR RHAB W/ECG: CPT | Mod: S$GLB,,, | Performed by: INTERNAL MEDICINE

## 2018-01-15 ENCOUNTER — CLINICAL SUPPORT (OUTPATIENT)
Dept: CARDIAC REHAB | Facility: CLINIC | Age: 48
End: 2018-01-15
Payer: COMMERCIAL

## 2018-01-15 DIAGNOSIS — I25.10 ATHEROSCLEROSIS OF NATIVE CORONARY ARTERY OF NATIVE HEART WITHOUT ANGINA PECTORIS: ICD-10-CM

## 2018-01-15 DIAGNOSIS — Z95.1 S/P CABG (CORONARY ARTERY BYPASS GRAFT): ICD-10-CM

## 2018-01-15 PROCEDURE — 93798 PHYS/QHP OP CAR RHAB W/ECG: CPT | Mod: S$GLB,,, | Performed by: INTERNAL MEDICINE

## 2018-01-22 ENCOUNTER — CLINICAL SUPPORT (OUTPATIENT)
Dept: CARDIAC REHAB | Facility: CLINIC | Age: 48
End: 2018-01-22
Payer: COMMERCIAL

## 2018-01-22 DIAGNOSIS — I25.10 CORONARY ARTERY DISEASE, ANGINA PRESENCE UNSPECIFIED, UNSPECIFIED VESSEL OR LESION TYPE, UNSPECIFIED WHETHER NATIVE OR TRANSPLANTED HEART: ICD-10-CM

## 2018-01-22 DIAGNOSIS — Z95.1 S/P CABG X 3: ICD-10-CM

## 2018-01-22 PROCEDURE — 93798 PHYS/QHP OP CAR RHAB W/ECG: CPT | Mod: S$GLB,,, | Performed by: INTERNAL MEDICINE

## 2018-01-24 ENCOUNTER — CLINICAL SUPPORT (OUTPATIENT)
Dept: CARDIAC REHAB | Facility: CLINIC | Age: 48
End: 2018-01-24
Payer: COMMERCIAL

## 2018-01-24 DIAGNOSIS — Z95.1 S/P CABG X 3: ICD-10-CM

## 2018-01-24 DIAGNOSIS — I25.10 CORONARY ARTERY DISEASE INVOLVING NATIVE CORONARY ARTERY OF NATIVE HEART WITHOUT ANGINA PECTORIS: ICD-10-CM

## 2018-01-24 PROCEDURE — 93798 PHYS/QHP OP CAR RHAB W/ECG: CPT | Mod: S$GLB,,, | Performed by: INTERNAL MEDICINE

## 2018-01-26 ENCOUNTER — CLINICAL SUPPORT (OUTPATIENT)
Dept: CARDIAC REHAB | Facility: CLINIC | Age: 48
End: 2018-01-26
Payer: COMMERCIAL

## 2018-01-26 DIAGNOSIS — Z95.1 POSTSURGICAL AORTOCORONARY BYPASS STATUS: ICD-10-CM

## 2018-01-26 DIAGNOSIS — I25.10 CORONARY ARTERY DISEASE, ANGINA PRESENCE UNSPECIFIED, UNSPECIFIED VESSEL OR LESION TYPE, UNSPECIFIED WHETHER NATIVE OR TRANSPLANTED HEART: ICD-10-CM

## 2018-01-26 PROCEDURE — 93798 PHYS/QHP OP CAR RHAB W/ECG: CPT | Mod: S$GLB,,, | Performed by: INTERNAL MEDICINE

## 2018-01-29 ENCOUNTER — CLINICAL SUPPORT (OUTPATIENT)
Dept: CARDIAC REHAB | Facility: CLINIC | Age: 48
End: 2018-01-29
Payer: COMMERCIAL

## 2018-01-29 DIAGNOSIS — Z95.1 S/P CABG X 3: ICD-10-CM

## 2018-01-29 DIAGNOSIS — I25.10 CORONARY ARTERY DISEASE, ANGINA PRESENCE UNSPECIFIED, UNSPECIFIED VESSEL OR LESION TYPE, UNSPECIFIED WHETHER NATIVE OR TRANSPLANTED HEART: ICD-10-CM

## 2018-01-29 PROCEDURE — 93798 PHYS/QHP OP CAR RHAB W/ECG: CPT | Mod: S$GLB,,, | Performed by: INTERNAL MEDICINE

## 2018-01-31 ENCOUNTER — CLINICAL SUPPORT (OUTPATIENT)
Dept: CARDIAC REHAB | Facility: CLINIC | Age: 48
End: 2018-01-31
Payer: COMMERCIAL

## 2018-01-31 DIAGNOSIS — Z95.1 S/P CABG X 3: ICD-10-CM

## 2018-01-31 DIAGNOSIS — I25.10 CORONARY ARTERY DISEASE, ANGINA PRESENCE UNSPECIFIED, UNSPECIFIED VESSEL OR LESION TYPE, UNSPECIFIED WHETHER NATIVE OR TRANSPLANTED HEART: ICD-10-CM

## 2018-01-31 PROCEDURE — 93798 PHYS/QHP OP CAR RHAB W/ECG: CPT | Mod: S$GLB,,, | Performed by: INTERNAL MEDICINE

## 2018-02-02 ENCOUNTER — CLINICAL SUPPORT (OUTPATIENT)
Dept: CARDIAC REHAB | Facility: CLINIC | Age: 48
End: 2018-02-02
Payer: COMMERCIAL

## 2018-02-02 DIAGNOSIS — I25.10 CORONARY ARTERY DISEASE INVOLVING NATIVE CORONARY ARTERY OF NATIVE HEART WITHOUT ANGINA PECTORIS: ICD-10-CM

## 2018-02-02 DIAGNOSIS — Z95.1 S/P CABG X 3: ICD-10-CM

## 2018-02-02 PROCEDURE — 93798 PHYS/QHP OP CAR RHAB W/ECG: CPT | Mod: S$GLB,,, | Performed by: INTERNAL MEDICINE

## 2018-02-05 ENCOUNTER — CLINICAL SUPPORT (OUTPATIENT)
Dept: CARDIAC REHAB | Facility: CLINIC | Age: 48
End: 2018-02-05
Payer: COMMERCIAL

## 2018-02-05 DIAGNOSIS — I25.10 CORONARY ARTERY DISEASE, ANGINA PRESENCE UNSPECIFIED, UNSPECIFIED VESSEL OR LESION TYPE, UNSPECIFIED WHETHER NATIVE OR TRANSPLANTED HEART: ICD-10-CM

## 2018-02-05 DIAGNOSIS — Z95.1 S/P CABG X 3: ICD-10-CM

## 2018-02-05 PROCEDURE — 93798 PHYS/QHP OP CAR RHAB W/ECG: CPT | Mod: S$GLB,,, | Performed by: INTERNAL MEDICINE

## 2018-02-07 ENCOUNTER — CLINICAL SUPPORT (OUTPATIENT)
Dept: CARDIAC REHAB | Facility: CLINIC | Age: 48
End: 2018-02-07
Payer: COMMERCIAL

## 2018-02-07 DIAGNOSIS — Z95.1 S/P CABG X 3: ICD-10-CM

## 2018-02-07 DIAGNOSIS — I25.10 CORONARY ARTERY DISEASE INVOLVING NATIVE CORONARY ARTERY OF NATIVE HEART WITHOUT ANGINA PECTORIS: ICD-10-CM

## 2018-02-07 PROCEDURE — 93798 PHYS/QHP OP CAR RHAB W/ECG: CPT | Mod: S$GLB,,, | Performed by: INTERNAL MEDICINE

## 2018-02-09 ENCOUNTER — CLINICAL SUPPORT (OUTPATIENT)
Dept: CARDIAC REHAB | Facility: CLINIC | Age: 48
End: 2018-02-09
Payer: COMMERCIAL

## 2018-02-09 DIAGNOSIS — Z95.1 S/P CABG X 3: ICD-10-CM

## 2018-02-09 DIAGNOSIS — I25.10 CORONARY ARTERY DISEASE INVOLVING NATIVE CORONARY ARTERY OF NATIVE HEART WITHOUT ANGINA PECTORIS: ICD-10-CM

## 2018-02-09 PROCEDURE — 93798 PHYS/QHP OP CAR RHAB W/ECG: CPT | Mod: S$GLB,,, | Performed by: INTERNAL MEDICINE

## 2018-02-12 ENCOUNTER — CLINICAL SUPPORT (OUTPATIENT)
Dept: CARDIAC REHAB | Facility: CLINIC | Age: 48
End: 2018-02-12
Payer: COMMERCIAL

## 2018-02-12 DIAGNOSIS — I25.10 CORONARY ARTERY DISEASE, ANGINA PRESENCE UNSPECIFIED, UNSPECIFIED VESSEL OR LESION TYPE, UNSPECIFIED WHETHER NATIVE OR TRANSPLANTED HEART: ICD-10-CM

## 2018-02-12 DIAGNOSIS — Z95.1 S/P CABG X 3: ICD-10-CM

## 2018-02-12 PROCEDURE — 93798 PHYS/QHP OP CAR RHAB W/ECG: CPT | Mod: S$GLB,,, | Performed by: INTERNAL MEDICINE

## 2018-02-14 ENCOUNTER — CLINICAL SUPPORT (OUTPATIENT)
Dept: CARDIAC REHAB | Facility: CLINIC | Age: 48
End: 2018-02-14
Payer: COMMERCIAL

## 2018-02-14 DIAGNOSIS — Z95.1 POSTSURGICAL AORTOCORONARY BYPASS STATUS: ICD-10-CM

## 2018-02-14 DIAGNOSIS — I25.10 CORONARY ARTERY DISEASE, ANGINA PRESENCE UNSPECIFIED, UNSPECIFIED VESSEL OR LESION TYPE, UNSPECIFIED WHETHER NATIVE OR TRANSPLANTED HEART: ICD-10-CM

## 2018-02-14 PROCEDURE — 93798 PHYS/QHP OP CAR RHAB W/ECG: CPT | Mod: S$GLB,,, | Performed by: INTERNAL MEDICINE

## 2018-02-15 DIAGNOSIS — Z95.1 POSTSURGICAL AORTOCORONARY BYPASS STATUS: Primary | ICD-10-CM

## 2018-02-15 DIAGNOSIS — I25.10 CORONARY ATHEROSCLEROSIS OF NATIVE CORONARY ARTERY: ICD-10-CM

## 2018-02-16 ENCOUNTER — CLINICAL SUPPORT (OUTPATIENT)
Dept: CARDIAC REHAB | Facility: CLINIC | Age: 48
End: 2018-02-16
Payer: COMMERCIAL

## 2018-02-16 DIAGNOSIS — Z95.1 S/P CABG X 3: ICD-10-CM

## 2018-02-16 DIAGNOSIS — I25.10 CORONARY ARTERY DISEASE INVOLVING NATIVE CORONARY ARTERY OF NATIVE HEART WITHOUT ANGINA PECTORIS: ICD-10-CM

## 2018-02-16 PROCEDURE — 93798 PHYS/QHP OP CAR RHAB W/ECG: CPT | Mod: S$GLB,,, | Performed by: INTERNAL MEDICINE

## 2018-02-19 ENCOUNTER — CLINICAL SUPPORT (OUTPATIENT)
Dept: CARDIAC REHAB | Facility: CLINIC | Age: 48
End: 2018-02-19
Payer: COMMERCIAL

## 2018-02-19 DIAGNOSIS — I25.10 ATHEROSCLEROSIS OF NATIVE CORONARY ARTERY OF NATIVE HEART WITHOUT ANGINA PECTORIS: ICD-10-CM

## 2018-02-19 DIAGNOSIS — Z95.1 S/P CABG (CORONARY ARTERY BYPASS GRAFT): ICD-10-CM

## 2018-02-19 PROCEDURE — 93798 PHYS/QHP OP CAR RHAB W/ECG: CPT | Mod: S$GLB,,, | Performed by: INTERNAL MEDICINE

## 2018-02-21 ENCOUNTER — CLINICAL SUPPORT (OUTPATIENT)
Dept: CARDIAC REHAB | Facility: CLINIC | Age: 48
End: 2018-02-21
Payer: COMMERCIAL

## 2018-02-21 DIAGNOSIS — I25.10 ATHEROSCLEROSIS OF NATIVE CORONARY ARTERY OF NATIVE HEART WITHOUT ANGINA PECTORIS: ICD-10-CM

## 2018-02-21 DIAGNOSIS — Z95.1 S/P CABG (CORONARY ARTERY BYPASS GRAFT): ICD-10-CM

## 2018-02-21 PROCEDURE — 93798 PHYS/QHP OP CAR RHAB W/ECG: CPT | Mod: S$GLB,,, | Performed by: INTERNAL MEDICINE

## 2018-02-23 ENCOUNTER — CLINICAL SUPPORT (OUTPATIENT)
Dept: CARDIAC REHAB | Facility: CLINIC | Age: 48
End: 2018-02-23
Payer: COMMERCIAL

## 2018-02-23 DIAGNOSIS — I25.10 ATHEROSCLEROSIS OF NATIVE CORONARY ARTERY OF NATIVE HEART WITHOUT ANGINA PECTORIS: ICD-10-CM

## 2018-02-23 DIAGNOSIS — Z95.1 S/P CABG (CORONARY ARTERY BYPASS GRAFT): ICD-10-CM

## 2018-02-23 PROCEDURE — 93798 PHYS/QHP OP CAR RHAB W/ECG: CPT | Mod: S$GLB,,, | Performed by: INTERNAL MEDICINE

## 2018-02-26 ENCOUNTER — CLINICAL SUPPORT (OUTPATIENT)
Dept: CARDIAC REHAB | Facility: CLINIC | Age: 48
End: 2018-02-26
Payer: COMMERCIAL

## 2018-02-26 DIAGNOSIS — Z95.1 S/P CABG (CORONARY ARTERY BYPASS GRAFT): ICD-10-CM

## 2018-02-26 DIAGNOSIS — I25.10 ATHEROSCLEROSIS OF NATIVE CORONARY ARTERY OF NATIVE HEART WITHOUT ANGINA PECTORIS: ICD-10-CM

## 2018-02-26 PROCEDURE — 93798 PHYS/QHP OP CAR RHAB W/ECG: CPT | Mod: S$GLB,,, | Performed by: INTERNAL MEDICINE

## 2018-02-28 ENCOUNTER — CLINICAL SUPPORT (OUTPATIENT)
Dept: CARDIAC REHAB | Facility: CLINIC | Age: 48
End: 2018-02-28
Payer: COMMERCIAL

## 2018-02-28 DIAGNOSIS — Z95.1 S/P CABG X 3: ICD-10-CM

## 2018-02-28 DIAGNOSIS — I25.10 CORONARY ARTERY DISEASE, ANGINA PRESENCE UNSPECIFIED, UNSPECIFIED VESSEL OR LESION TYPE, UNSPECIFIED WHETHER NATIVE OR TRANSPLANTED HEART: ICD-10-CM

## 2018-02-28 PROCEDURE — 93798 PHYS/QHP OP CAR RHAB W/ECG: CPT | Mod: S$GLB,,, | Performed by: INTERNAL MEDICINE

## 2018-03-05 ENCOUNTER — HOSPITAL ENCOUNTER (OUTPATIENT)
Dept: CARDIOLOGY | Facility: CLINIC | Age: 48
Discharge: HOME | End: 2018-03-05
Attending: INTERNAL MEDICINE
Payer: COMMERCIAL

## 2018-03-05 DIAGNOSIS — I25.10 CORONARY ATHEROSCLEROSIS OF NATIVE CORONARY ARTERY: ICD-10-CM

## 2018-03-05 DIAGNOSIS — Z95.1 POSTSURGICAL AORTOCORONARY BYPASS STATUS: ICD-10-CM

## 2018-03-05 LAB — DIASTOLIC DYSFUNCTION: NO

## 2018-03-05 PROCEDURE — 94621 CARDIOPULM EXERCISE TESTING: CPT | Mod: S$GLB,,, | Performed by: INTERNAL MEDICINE

## 2018-03-09 ENCOUNTER — TELEPHONE (OUTPATIENT)
Dept: ELECTROPHYSIOLOGY | Facility: CLINIC | Age: 48
End: 2018-03-09

## 2018-03-09 ENCOUNTER — CLINICAL SUPPORT (OUTPATIENT)
Dept: CARDIAC REHAB | Facility: CLINIC | Age: 48
End: 2018-03-09
Payer: COMMERCIAL

## 2018-03-09 DIAGNOSIS — Z95.1 S/P CABG X 3: ICD-10-CM

## 2018-03-09 PROCEDURE — 93798 PHYS/QHP OP CAR RHAB W/ECG: CPT | Mod: S$GLB,,, | Performed by: INTERNAL MEDICINE

## 2018-03-09 NOTE — TELEPHONE ENCOUNTER
Left message that CPX and lab results were reviewed by Dr. Ennis and all are okay. Instructed to call back with any questions.

## 2018-03-09 NOTE — PROGRESS NOTES
Patient here for exit interview for Phase II Cardiac rehab.    Discussed with patient pre/post labs, stress tests, QOL, risk factors, goals & home exercise prescription - pt already participating in exercise program.     QOL:  The following changes were noted on Dewayne & SF36 Questionnaire:  Dewayne:                 PRE:       POST:              SF36         PRE:        POST:  Anxiety                    5             3                                       110.4         125.4                                  Somatic                  9            4                                                     Depression             1            1                                                      Hostility                   0            1                                                         RISK FACTORS:  The following risk factors have improved:  Nutrition - pt states he is eating healthier, hyperlipidemia Total cholesterol 128 to 116, HDL 41 to 43, LDL 54 to 53,  to 102, sedentary lifestyle - pt is exercising 5-6 days per week, stress - pt states stress is decreased, exercise - see above.     GOALS:  The following goals have been met:  Decrease cholesterol level  Increase exercise tolerance  Increase knowledge of CAD  Decrease blood pressure  Weight loss - BMI decreased  ID & manage personal areas of stress  Learn more about healthy eating      Home exercise prescription discussed with patient.  Patient has been instructed to follow up with Cardiologist.   Information on Phase III Cardiac Rehab given to patient.  This report forwarded to Dr. Ennis for review.    KEVIN Shnae RN  Cardiac Rehab Nurse

## 2018-03-09 NOTE — TELEPHONE ENCOUNTER
----- Message from Link Ennis MD sent at 3/7/2018  4:32 AM CST -----  All results are OK. Please see comments below- if any- and call patient.  In general you do not need to route back to me.

## 2018-03-09 NOTE — PROGRESS NOTES
Exercise:  Met with the patient for exit interview.  Entry and exit CPX test results were discussed as well as current and future exercise routine.      On entry, an estimated MET Level of 11.0 and a Peak VO2 of 17.8ml/kg/min (5.1 actual METS) were measured.  Upon exit, the patient achieved 15.0 estimated METS on the CPX test and a Peak VO2 of 28.7ml/kg/min (8.2 actual METS).      The goal of a 30% improvement to 14.3 was exceeded.    Based on exit CPX test, the target heart range during aerobic exercise for this patient is 121 to 139 bpm.      Exit CPX test results also included weight (169 lb), abdominal girth (36.5 in), BMI (24.95), and body composition (19.3%).    Patient stated he has continued to exercise since completing the Phase II cardiac rehab program.  He is walking on the treadmill and/or using the elliptical for about 32 minutes 6 days/week.  He is also doing resistance training 3 non-consecutive days and stretching after each exercise session.       Aerobic exercise recommendations of 5 to 6 days/week for 30 to 60 minutes, resistance training 2 to 3 non-consecutive days/week and stretching after each session of exercise was reviewed and patient was asked to call if they have and questions in the future.  Patient stated understanding.    Patient was compliant with attendance to the rehab classes.    Jagdeep Hensley., CEP

## 2018-04-26 ENCOUNTER — HOSPITAL ENCOUNTER (OUTPATIENT)
Dept: CARDIOLOGY | Facility: CLINIC | Age: 48
Discharge: HOME | End: 2018-04-26
Payer: COMMERCIAL

## 2018-04-26 ENCOUNTER — OFFICE VISIT (OUTPATIENT)
Dept: ELECTROPHYSIOLOGY | Facility: CLINIC | Age: 48
End: 2018-04-26
Payer: COMMERCIAL

## 2018-04-26 VITALS
DIASTOLIC BLOOD PRESSURE: 62 MMHG | SYSTOLIC BLOOD PRESSURE: 120 MMHG | WEIGHT: 171.5 LBS | BODY MASS INDEX: 25.4 KG/M2 | OXYGEN SATURATION: 97 % | HEIGHT: 69 IN | HEART RATE: 62 BPM

## 2018-04-26 DIAGNOSIS — E78.2 MIXED HYPERLIPIDEMIA: ICD-10-CM

## 2018-04-26 DIAGNOSIS — Z95.1 S/P CABG X 3: Primary | ICD-10-CM

## 2018-04-26 DIAGNOSIS — I25.10 CORONARY ARTERY DISEASE INVOLVING NATIVE CORONARY ARTERY OF NATIVE HEART WITHOUT ANGINA PECTORIS: ICD-10-CM

## 2018-04-26 DIAGNOSIS — I25.10 CORONARY ARTERY DISEASE, ANGINA PRESENCE UNSPECIFIED, UNSPECIFIED VESSEL OR LESION TYPE, UNSPECIFIED WHETHER NATIVE OR TRANSPLANTED HEART: ICD-10-CM

## 2018-04-26 PROCEDURE — 93000 ELECTROCARDIOGRAM COMPLETE: CPT | Mod: S$GLB,,, | Performed by: INTERNAL MEDICINE

## 2018-04-26 PROCEDURE — 99214 OFFICE O/P EST MOD 30 MIN: CPT | Mod: S$GLB,,, | Performed by: INTERNAL MEDICINE

## 2018-04-26 PROCEDURE — 99999 PR PBB SHADOW E&M-EST. PATIENT-LVL III: CPT | Mod: PBBFAC,,, | Performed by: INTERNAL MEDICINE

## 2018-04-26 RX ORDER — METOPROLOL SUCCINATE 50 MG/1
50 TABLET, EXTENDED RELEASE ORAL DAILY
Qty: 90 TABLET | Refills: 3 | Status: SHIPPED | OUTPATIENT
Start: 2018-04-26 | End: 2019-04-22 | Stop reason: SDUPTHER

## 2018-04-26 NOTE — PROGRESS NOTES
Subjective:   Patient ID:  Fausto Patterson is a 48 y.o. male     Chief complaint:No chief complaint on file.      HPI  Background:  CAD with AP followed by CABGX3 on 10/6/17     - Left Main Coronary Artery:             The distal LM has a 70% stenosis. There is BENITA 3 flow.     - Left Anterior Descending Artery:             The mid LAD has a 80% stenosis. There is BENITA 3 flow.     - Left Circumflex Artery:             The LCX has luminal irregularities. There is BENITA 3 flow.     - Right Coronary Artery:             The proximal RCA has a 50% stenosis. There is BENITA 3 flow.     - Ramus:             The proximal ramus has a 75% stenosis. There is BENITA 3 flow.     - OM2:             The proximal OM2 has a 50% stenosis. There is BENITA 3 flow.  >>  CABG:  left internal mammary artery to LAD, right mammary artery to distal RCA, SVG to OM1    Only risk factors were Fam Hx and HLP    Normal EF, no MIs    Update since then:  Has done well post op  He has completed his cardiac rehab and now is exercising daily on his own-- 30 min at least alternates treadmill and elliptical. Does wts after the treadmill days-- At least 5 times a week.   He feels that the BB causes some dizziness when he is hot, gets some OH and some ED  He is on Crestor 40 a day and his lipid profile is excellent  I have reviewed the actual image of the ECG tracing obtained today and it shows NSR with normal intervals    Current Outpatient Prescriptions   Medication Sig    aspirin (ECOTRIN) 81 MG EC tablet Take 1 tablet (81 mg total) by mouth once daily.    ticagrelor (BRILINTA) 90 mg tablet Take 1 tablet (90 mg total) by mouth 2 (two) times daily.    metoprolol succinate (TOPROL-XL) 50 MG 24 hr tablet Take 1 tablet (50 mg total) by mouth once daily.    rosuvastatin (CRESTOR) 40 MG Tab Take 1 tablet (40 mg total) by mouth once daily.     No current facility-administered medications for this visit.      Review of Systems   Constitution: Negative for  decreased appetite, weakness, malaise/fatigue, weight gain and weight loss.   Eyes: Negative for blurred vision.   Cardiovascular: Positive for leg swelling. Negative for chest pain, claudication, cyanosis, dyspnea on exertion, irregular heartbeat, near-syncope, orthopnea and palpitations.   Respiratory: Positive for shortness of breath, snoring and wheezing. Negative for cough and sleep disturbances due to breathing.    Endocrine: Negative for heat intolerance.   Hematologic/Lymphatic: Does not bruise/bleed easily.   Musculoskeletal: Negative for muscle weakness and myalgias.   Gastrointestinal: Negative for melena, nausea and vomiting.   Genitourinary: Negative for nocturia.   Neurological: Positive for dizziness. Negative for excessive daytime sleepiness, headaches and light-headedness.   Psychiatric/Behavioral: Negative for depression, memory loss and substance abuse. The patient does not have insomnia and is not nervous/anxious.        Objective:   Physical Exam   Constitutional: He is oriented to person, place, and time. He appears well-developed and well-nourished.   HENT:   Head: Normocephalic and atraumatic.   Right Ear: External ear normal.   Left Ear: External ear normal.   Eyes: Conjunctivae are normal. Pupils are equal, round, and reactive to light. Right eye exhibits no discharge. Left eye exhibits no discharge. Right conjunctiva is not injected. Left conjunctiva has no hemorrhage.   Neck: Neck supple. No JVD present. No thyromegaly present.   Cardiovascular: Normal rate, regular rhythm, normal heart sounds and intact distal pulses.  PMI is not displaced.  Exam reveals no gallop, no friction rub, no midsystolic click and no opening snap.    No murmur heard.  Pulses:       Carotid pulses are 2+ on the right side, and 2+ on the left side.       Radial pulses are 2+ on the right side, and 2+ on the left side.        Dorsalis pedis pulses are 2+ on the right side, and 2+ on the left side.        Posterior  "tibial pulses are 2+ on the right side, and 2+ on the left side.   Pulmonary/Chest: Effort normal and breath sounds normal. No respiratory distress. He has no wheezes. He has no rales. He exhibits no tenderness.   Abdominal: Soft. Normal appearance. He exhibits no pulsatile liver. There is no hepatomegaly. There is no tenderness. There is no rebound and no guarding.   Musculoskeletal: Normal range of motion. He exhibits no edema or tenderness.        Right knee: He exhibits no swelling.        Left knee: He exhibits no swelling.        Right ankle: He exhibits no swelling.        Left ankle: He exhibits no swelling.        Right lower leg: He exhibits no swelling.        Left lower leg: He exhibits no swelling.        Right foot: There is no swelling.        Left foot: There is no swelling.   Neurological: He is alert and oriented to person, place, and time. He has normal strength and normal reflexes. No cranial nerve deficit. Coordination normal.   Skin: Skin is warm, dry and intact. No rash noted. He is not diaphoretic. No cyanosis.   Psychiatric: He has a normal mood and affect. His behavior is normal.   Nursing note and vitals reviewed.    /62   Pulse 62   Ht 5' 9" (1.753 m)   Wt 77.8 kg (171 lb 8.3 oz)   SpO2 97%   BMI 25.33 kg/m²      Assessment:      1. S/P CABG x 3    2. Mixed hyperlipidemia    3. Coronary artery disease involving native coronary artery of native heart without angina pectoris        Plan:    We can decrease his Toprol to 50 a day and if he feels he needs more reduction, he will decrease to 25 a day after a month.     No orders of the defined types were placed in this encounter.    Follow-up in about 6 months (around 10/26/2018).  Medications Discontinued During This Encounter   Medication Reason    metoprolol succinate (TOPROL-XL) 100 MG 24 hr tablet Reorder     New Prescriptions    No medications on file     Modified Medications    Modified Medication Previous Medication    " METOPROLOL SUCCINATE (TOPROL-XL) 50 MG 24 HR TABLET metoprolol succinate (TOPROL-XL) 100 MG 24 hr tablet       Take 1 tablet (50 mg total) by mouth once daily.    Take 1 tablet (100 mg total) by mouth once daily.

## 2018-09-25 ENCOUNTER — TELEPHONE (OUTPATIENT)
Dept: INTERNAL MEDICINE | Facility: CLINIC | Age: 48
End: 2018-09-25

## 2018-09-25 DIAGNOSIS — Z00.00 ANNUAL PHYSICAL EXAM: Primary | ICD-10-CM

## 2018-09-25 NOTE — TELEPHONE ENCOUNTER
----- Message from Julissa Nielsen sent at 9/25/2018  8:25 AM CDT -----  Doctor appointment and lab have been scheduled.  Please link lab orders to the lab appointment.  Date of doctor appointment:  11/14/18  Physical or EP:  epp  Date of lab appointment:  11/109/18  Comments:

## 2018-10-05 RX ORDER — TICAGRELOR 90 MG/1
TABLET ORAL
Qty: 60 TABLET | Refills: 10 | Status: CANCELLED | OUTPATIENT
Start: 2018-10-05

## 2018-10-15 ENCOUNTER — DOCUMENTATION ONLY (OUTPATIENT)
Dept: CARDIOTHORACIC SURGERY | Facility: CLINIC | Age: 48
End: 2018-10-15

## 2018-10-15 ENCOUNTER — PATIENT MESSAGE (OUTPATIENT)
Dept: ELECTROPHYSIOLOGY | Facility: CLINIC | Age: 48
End: 2018-10-15

## 2018-10-15 NOTE — PROGRESS NOTES
Spoke to patient, who requested a refill on Brilinta (original Rx written by Bre be NP). Since patient was discharged from [facility] on 11-7 of 2017, he is now under the care of Dr. Ennis, who will be filling his prescriptions moving forward.

## 2018-10-16 ENCOUNTER — PATIENT MESSAGE (OUTPATIENT)
Dept: INTERNAL MEDICINE | Facility: CLINIC | Age: 48
End: 2018-10-16

## 2018-10-16 RX ORDER — METOPROLOL SUCCINATE 50 MG/1
50 TABLET, EXTENDED RELEASE ORAL DAILY
Qty: 90 TABLET | Refills: 3 | Status: CANCELLED | OUTPATIENT
Start: 2018-10-16 | End: 2018-11-15

## 2018-10-31 RX ORDER — ROSUVASTATIN CALCIUM 40 MG/1
40 TABLET, COATED ORAL DAILY
Qty: 90 TABLET | Refills: 3 | Status: SHIPPED | OUTPATIENT
Start: 2018-10-31 | End: 2019-11-09 | Stop reason: SDUPTHER

## 2018-11-09 ENCOUNTER — LAB VISIT (OUTPATIENT)
Dept: LAB | Facility: HOSPITAL | Age: 48
End: 2018-11-09
Payer: COMMERCIAL

## 2018-11-09 DIAGNOSIS — E78.2 MIXED HYPERLIPIDEMIA: ICD-10-CM

## 2018-11-09 DIAGNOSIS — Z00.00 ANNUAL PHYSICAL EXAM: ICD-10-CM

## 2018-11-09 LAB
ALBUMIN SERPL BCP-MCNC: 4.4 G/DL
ALP SERPL-CCNC: 65 U/L
ALT SERPL W/O P-5'-P-CCNC: 24 U/L
ANION GAP SERPL CALC-SCNC: 8 MMOL/L
AST SERPL-CCNC: 22 U/L
BASOPHILS # BLD AUTO: 0.03 K/UL
BASOPHILS NFR BLD: 0.5 %
BILIRUB SERPL-MCNC: 1.7 MG/DL
BUN SERPL-MCNC: 21 MG/DL
CALCIUM SERPL-MCNC: 10.1 MG/DL
CHLORIDE SERPL-SCNC: 105 MMOL/L
CHOLEST SERPL-MCNC: 131 MG/DL
CHOLEST SERPL-MCNC: 131 MG/DL
CHOLEST/HDLC SERPL: 2.9 {RATIO}
CHOLEST/HDLC SERPL: 2.9 {RATIO}
CO2 SERPL-SCNC: 26 MMOL/L
CREAT SERPL-MCNC: 1.2 MG/DL
DIFFERENTIAL METHOD: ABNORMAL
EOSINOPHIL # BLD AUTO: 0.5 K/UL
EOSINOPHIL NFR BLD: 8.7 %
ERYTHROCYTE [DISTWIDTH] IN BLOOD BY AUTOMATED COUNT: 13.2 %
EST. GFR  (AFRICAN AMERICAN): >60 ML/MIN/1.73 M^2
EST. GFR  (NON AFRICAN AMERICAN): >60 ML/MIN/1.73 M^2
GLUCOSE SERPL-MCNC: 98 MG/DL
HCT VFR BLD AUTO: 49.7 %
HDLC SERPL-MCNC: 45 MG/DL
HDLC SERPL-MCNC: 45 MG/DL
HDLC SERPL: 34.4 %
HDLC SERPL: 34.4 %
HGB BLD-MCNC: 16.4 G/DL
LDLC SERPL CALC-MCNC: 63.2 MG/DL
LDLC SERPL CALC-MCNC: 63.2 MG/DL
LYMPHOCYTES # BLD AUTO: 1.7 K/UL
LYMPHOCYTES NFR BLD: 29 %
MCH RBC QN AUTO: 28.9 PG
MCHC RBC AUTO-ENTMCNC: 33 G/DL
MCV RBC AUTO: 88 FL
MONOCYTES # BLD AUTO: 0.7 K/UL
MONOCYTES NFR BLD: 11.4 %
NEUTROPHILS # BLD AUTO: 2.9 K/UL
NEUTROPHILS NFR BLD: 50.2 %
NONHDLC SERPL-MCNC: 86 MG/DL
NONHDLC SERPL-MCNC: 86 MG/DL
PLATELET # BLD AUTO: 230 K/UL
PMV BLD AUTO: 9.3 FL
POTASSIUM SERPL-SCNC: 5.1 MMOL/L
PROT SERPL-MCNC: 7.7 G/DL
RBC # BLD AUTO: 5.67 M/UL
SODIUM SERPL-SCNC: 139 MMOL/L
TRIGL SERPL-MCNC: 114 MG/DL
TRIGL SERPL-MCNC: 114 MG/DL
WBC # BLD AUTO: 5.86 K/UL

## 2018-11-09 PROCEDURE — 80053 COMPREHEN METABOLIC PANEL: CPT

## 2018-11-09 PROCEDURE — 36415 COLL VENOUS BLD VENIPUNCTURE: CPT

## 2018-11-09 PROCEDURE — 85025 COMPLETE CBC W/AUTO DIFF WBC: CPT

## 2018-11-09 PROCEDURE — 80061 LIPID PANEL: CPT

## 2018-11-14 ENCOUNTER — IMMUNIZATION (OUTPATIENT)
Dept: PHARMACY | Facility: CLINIC | Age: 48
End: 2018-11-14
Payer: COMMERCIAL

## 2018-11-14 ENCOUNTER — OFFICE VISIT (OUTPATIENT)
Dept: INTERNAL MEDICINE | Facility: CLINIC | Age: 48
End: 2018-11-14
Payer: COMMERCIAL

## 2018-11-14 VITALS
WEIGHT: 171.75 LBS | HEIGHT: 68 IN | DIASTOLIC BLOOD PRESSURE: 76 MMHG | HEART RATE: 95 BPM | SYSTOLIC BLOOD PRESSURE: 106 MMHG | OXYGEN SATURATION: 98 % | BODY MASS INDEX: 26.03 KG/M2

## 2018-11-14 DIAGNOSIS — I25.10 CORONARY ARTERY DISEASE INVOLVING NATIVE CORONARY ARTERY OF NATIVE HEART WITHOUT ANGINA PECTORIS: ICD-10-CM

## 2018-11-14 DIAGNOSIS — N20.0 CALCIUM OXALATE KIDNEY STONES: ICD-10-CM

## 2018-11-14 DIAGNOSIS — Z00.00 ANNUAL PHYSICAL EXAM: Primary | ICD-10-CM

## 2018-11-14 DIAGNOSIS — K43.9 VENTRAL HERNIA WITHOUT OBSTRUCTION OR GANGRENE: ICD-10-CM

## 2018-11-14 DIAGNOSIS — Z95.1 S/P CABG X 3: ICD-10-CM

## 2018-11-14 PROCEDURE — 99396 PREV VISIT EST AGE 40-64: CPT | Mod: S$GLB,,, | Performed by: INTERNAL MEDICINE

## 2018-11-14 PROCEDURE — 99999 PR PBB SHADOW E&M-EST. PATIENT-LVL III: CPT | Mod: PBBFAC,,, | Performed by: INTERNAL MEDICINE

## 2018-11-14 NOTE — PROGRESS NOTES
Subjective:       Patient ID: Fausto Patterson is a 48 y.o. male.    Chief Complaint: Annual Exam    HPI   Aware of small compressible mass inferior to sternum, noticed after CABG last October.  He had a drain there post-operatively.    Walks on treadmill/elliptical 5-6 days.   Light wts 3 days a week.  Crunches 2 days.  Some sob when he first starts to exercise, but no cp.  Stamina good.    Wonders how long he should stay on Brilinta.  Seeing Dr Lennie Belle soon.        H/o schatzki's ring.  Some dysphagia if he eats too quickly.      H/o calcium oxalate stones.  He believes he passed a small piece of stone last week, assoc with malaise.  Review of Systems   Constitutional: Negative for activity change and unexpected weight change.   HENT: Negative for postnasal drip, rhinorrhea and sinus pressure.    Respiratory: Negative for chest tightness and shortness of breath.    Cardiovascular: Negative for chest pain and leg swelling.   Gastrointestinal: Negative for abdominal pain, nausea and vomiting.   Genitourinary: Negative for difficulty urinating, dysuria, hematuria and urgency.   Skin: Negative for rash.   Neurological: Negative for headaches.   Psychiatric/Behavioral: Negative for dysphoric mood and sleep disturbance. The patient is not nervous/anxious.        Objective:      Physical Exam   Constitutional: He is oriented to person, place, and time. He appears well-developed and well-nourished. No distress.   HENT:   Head: Normocephalic and atraumatic.   Eyes: Conjunctivae are normal. Left eye exhibits no discharge. No scleral icterus.   Neck: Normal range of motion. No JVD present. No thyromegaly present.   Cardiovascular: Normal rate, regular rhythm and normal heart sounds.   No murmur heard.  Pulmonary/Chest: Effort normal and breath sounds normal. No respiratory distress.   Abdominal: Soft. He exhibits no distension and no mass. There is no tenderness.   Compressible small hernia inferior to sternum.    Musculoskeletal: He exhibits no edema.   Lymphadenopathy:     He has no cervical adenopathy.   Neurological: He is alert and oriented to person, place, and time.   Skin: Skin is dry. No rash noted.   Psychiatric: He has a normal mood and affect. His behavior is normal. Judgment and thought content normal.       Results for orders placed or performed in visit on 11/09/18   Lipid panel   Result Value Ref Range    Cholesterol 131 120 - 199 mg/dL    Triglycerides 114 30 - 150 mg/dL    HDL 45 40 - 75 mg/dL    LDL Cholesterol 63.2 63.0 - 159.0 mg/dL    HDL/Chol Ratio 34.4 20.0 - 50.0 %    Total Cholesterol/HDL Ratio 2.9 2.0 - 5.0    Non-HDL Cholesterol 86 mg/dL   CBC auto differential   Result Value Ref Range    WBC 5.86 3.90 - 12.70 K/uL    RBC 5.67 4.60 - 6.20 M/uL    Hemoglobin 16.4 14.0 - 18.0 g/dL    Hematocrit 49.7 40.0 - 54.0 %    MCV 88 82 - 98 fL    MCH 28.9 27.0 - 31.0 pg    MCHC 33.0 32.0 - 36.0 g/dL    RDW 13.2 11.5 - 14.5 %    Platelets 230 150 - 350 K/uL    MPV 9.3 9.2 - 12.9 fL    Gran # (ANC) 2.9 1.8 - 7.7 K/uL    Lymph # 1.7 1.0 - 4.8 K/uL    Mono # 0.7 0.3 - 1.0 K/uL    Eos # 0.5 0.0 - 0.5 K/uL    Baso # 0.03 0.00 - 0.20 K/uL    Gran% 50.2 38.0 - 73.0 %    Lymph% 29.0 18.0 - 48.0 %    Mono% 11.4 4.0 - 15.0 %    Eosinophil% 8.7 (H) 0.0 - 8.0 %    Basophil% 0.5 0.0 - 1.9 %    Differential Method Automated    Comprehensive metabolic panel   Result Value Ref Range    Sodium 139 136 - 145 mmol/L    Potassium 5.1 3.5 - 5.1 mmol/L    Chloride 105 95 - 110 mmol/L    CO2 26 23 - 29 mmol/L    Glucose 98 70 - 110 mg/dL    BUN, Bld 21 (H) 6 - 20 mg/dL    Creatinine 1.2 0.5 - 1.4 mg/dL    Calcium 10.1 8.7 - 10.5 mg/dL    Total Protein 7.7 6.0 - 8.4 g/dL    Albumin 4.4 3.5 - 5.2 g/dL    Total Bilirubin 1.7 (H) 0.1 - 1.0 mg/dL    Alkaline Phosphatase 65 55 - 135 U/L    AST 22 10 - 40 U/L    ALT 24 10 - 44 U/L    Anion Gap 8 8 - 16 mmol/L    eGFR if African American >60.0 >60 mL/min/1.73 m^2    eGFR if non African  American >60.0 >60 mL/min/1.73 m^2   Lipid panel   Result Value Ref Range    Cholesterol 131 120 - 199 mg/dL    Triglycerides 114 30 - 150 mg/dL    HDL 45 40 - 75 mg/dL    LDL Cholesterol 63.2 63.0 - 159.0 mg/dL    HDL/Chol Ratio 34.4 20.0 - 50.0 %    Total Cholesterol/HDL Ratio 2.9 2.0 - 5.0    Non-HDL Cholesterol 86 mg/dL     Assessment:       1. Annual physical exam    2. Coronary artery disease involving native coronary artery of native heart without angina pectoris    3. S/P CABG x 3    4. Calcium oxalate kidney stones    5. Ventral hernia without obstruction or gangrene    6. BMI 26.0-26.9,adult        Plan:       Fausto was seen today for annual exam.    Diagnoses and all orders for this visit:    Annual physical exam    Coronary artery disease involving native coronary artery of native heart without angina pectoris    S/P CABG x 3    Calcium oxalate kidney stones    Ventral hernia without obstruction or gangrene    BMI 26.0-26.9,adult       EGD if dysphagia worsens.    Colonoscopy in upcoming year.

## 2018-12-27 ENCOUNTER — OFFICE VISIT (OUTPATIENT)
Dept: ELECTROPHYSIOLOGY | Facility: CLINIC | Age: 48
End: 2018-12-27
Payer: COMMERCIAL

## 2018-12-27 ENCOUNTER — HOSPITAL ENCOUNTER (OUTPATIENT)
Dept: CARDIOLOGY | Facility: CLINIC | Age: 48
Discharge: HOME | End: 2018-12-27
Payer: COMMERCIAL

## 2018-12-27 VITALS
SYSTOLIC BLOOD PRESSURE: 119 MMHG | BODY MASS INDEX: 26.12 KG/M2 | HEIGHT: 69 IN | WEIGHT: 176.38 LBS | HEART RATE: 79 BPM | DIASTOLIC BLOOD PRESSURE: 74 MMHG

## 2018-12-27 DIAGNOSIS — I25.10 CORONARY ARTERY DISEASE, ANGINA PRESENCE UNSPECIFIED, UNSPECIFIED VESSEL OR LESION TYPE, UNSPECIFIED WHETHER NATIVE OR TRANSPLANTED HEART: ICD-10-CM

## 2018-12-27 DIAGNOSIS — E78.2 MIXED HYPERLIPIDEMIA: ICD-10-CM

## 2018-12-27 DIAGNOSIS — Z95.1 S/P CABG X 3: Primary | ICD-10-CM

## 2018-12-27 PROCEDURE — 93005 ELECTROCARDIOGRAM TRACING: CPT | Mod: S$GLB,,, | Performed by: INTERNAL MEDICINE

## 2018-12-27 PROCEDURE — 99214 OFFICE O/P EST MOD 30 MIN: CPT | Mod: S$GLB,,, | Performed by: INTERNAL MEDICINE

## 2018-12-27 PROCEDURE — 93010 ELECTROCARDIOGRAM REPORT: CPT | Mod: S$GLB,,, | Performed by: INTERNAL MEDICINE

## 2018-12-27 PROCEDURE — 99999 PR PBB SHADOW E&M-EST. PATIENT-LVL III: CPT | Mod: PBBFAC,,, | Performed by: INTERNAL MEDICINE

## 2018-12-27 PROCEDURE — 3008F BODY MASS INDEX DOCD: CPT | Mod: CPTII,S$GLB,, | Performed by: INTERNAL MEDICINE

## 2018-12-27 NOTE — PROGRESS NOTES
Subjective:   Patient ID:  Fausto Patterson is a 48 y.o. male     Chief complaint:S/P CABG x 3      HPI  Background as recorded in my last note (4/26/17):  Background:  CAD with AP followed by CABGX3 on 10/6/17     - Left Main Coronary Artery:             The distal LM has a 70% stenosis. There is BENITA 3 flow.     - Left Anterior Descending Artery:             The mid LAD has a 80% stenosis. There is BENITA 3 flow.     - Left Circumflex Artery:             The LCX has luminal irregularities. There is BENITA 3 flow.     - Right Coronary Artery:             The proximal RCA has a 50% stenosis. There is BENITA 3 flow.     - Ramus:             The proximal ramus has a 75% stenosis. There is BENITA 3 flow.     - OM2:             The proximal OM2 has a 50% stenosis. There is BENITA 3 flow.  >>  CABG:  left internal mammary artery to LAD, right mammary artery to distal RCA, SVG to OM1     Only risk factors were Fam Hx and HLP     Normal EF, no MIs     Update since then:  Has done well post op  He has completed his cardiac rehab and now is exercising daily on his own-- 30 min at least alternates treadmill and elliptical. Does wts after the treadmill days-- At least 5 times a week.   He feels that the BB causes some dizziness when he is hot, gets some OH and some ED  He is on Crestor 40 a day and his lipid profile is excellent  I have reviewed the actual image of the ECG tracing obtained today and it shows NSR with normal intervals     Update since then:  He has been doing well-- his only c/o is a small substernal hernia that appears to be more prominent during exercise. He gets tired within the first 10 min of exercise -- 25 to 30 min at the treadmill 5 times a week -- full speed after the first min -- he gets second wind after 10 min- he does not warm up much  Some  Easy bruisability with DAPT  I have reviewed the actual image of the ECG tracing obtained today and it shows NSR with normal intervals    Results for FAUSTO PATTERSON (MRN  [id number]) as of 12/27/2018 09:25   Ref. Range 10/17/2016 07:01 10/2/2017 07:19 11/21/2017 07:26 3/5/2018 07:02 11/9/2018 07:00 11/9/2018 07:00   Cholesterol Latest Ref Range: 120 - 199 mg/dL 223 (H) 160 128 116 (L) 131 131   HDL Latest Ref Range: 40 - 75 mg/dL 47 42 41 43 45 45   HDL/Chol Ratio Latest Ref Range: 20.0 - 50.0 % 21.1 26.3 32.0 37.1 34.4 34.4   LDL Cholesterol Latest Ref Range: 63.0 - 159.0 mg/dL 140.2 89.8 53.8 (L) 52.6 (L) 63.2 63.2   Non-HDL Cholesterol Latest Units: mg/dL 176 118 87 73 86 86   Total Cholesterol/HDL Ratio Latest Ref Range: 2.0 - 5.0  4.7 3.8 3.1 2.7 2.9 2.9   Triglycerides Latest Ref Range: 30 - 150 mg/dL 179 (H) 141 166 (H) 102 114 114     Current Outpatient Medications   Medication Sig    aspirin (ECOTRIN) 81 MG EC tablet Take 1 tablet (81 mg total) by mouth once daily.    metoprolol succinate (TOPROL-XL) 50 MG 24 hr tablet Take 1 tablet (50 mg total) by mouth once daily.    rosuvastatin (CRESTOR) 40 MG Tab TAKE 1 TABLET (40 MG TOTAL) BY MOUTH ONCE DAILY.    sennosides/docusate sodium (STOOL SOFTENER-LAXATIVE ORAL) Take by mouth.    ticagrelor (BRILINTA) 90 mg tablet Take 1 tablet (90 mg total) by mouth 2 (two) times daily.     No current facility-administered medications for this visit.      Review of Systems   Constitution: Negative for decreased appetite, weakness, malaise/fatigue, weight gain and weight loss.   Eyes: Negative for blurred vision.   Cardiovascular: Negative for chest pain, claudication, cyanosis, dyspnea on exertion, irregular heartbeat, leg swelling, near-syncope, orthopnea and palpitations.   Respiratory: Negative for cough, shortness of breath, sleep disturbances due to breathing, snoring and wheezing.    Endocrine: Negative for heat intolerance.   Hematologic/Lymphatic: Does not bruise/bleed easily.   Musculoskeletal: Negative for muscle weakness and myalgias.   Gastrointestinal: Negative for melena, nausea and vomiting.   Genitourinary: Negative for  nocturia.   Neurological: Negative for excessive daytime sleepiness, dizziness, headaches and light-headedness.   Psychiatric/Behavioral: Negative for depression, memory loss and substance abuse. The patient does not have insomnia and is not nervous/anxious.        Objective:   Physical Exam   Constitutional: He is oriented to person, place, and time. He appears well-developed and well-nourished.   HENT:   Head: Normocephalic and atraumatic.   Right Ear: External ear normal.   Left Ear: External ear normal.   Eyes: Conjunctivae are normal. Pupils are equal, round, and reactive to light. Right eye exhibits no discharge. Left eye exhibits no discharge. Right conjunctiva is not injected. Left conjunctiva has no hemorrhage.   Neck: Neck supple. No JVD present. No thyromegaly present.   Cardiovascular: Normal rate, regular rhythm, normal heart sounds and intact distal pulses. PMI is not displaced. Exam reveals no gallop, no friction rub, no midsystolic click and no opening snap.   No murmur heard.  Pulses:       Carotid pulses are 2+ on the right side, and 2+ on the left side.       Radial pulses are 2+ on the right side, and 2+ on the left side.        Dorsalis pedis pulses are 2+ on the right side, and 2+ on the left side.        Posterior tibial pulses are 2+ on the right side, and 2+ on the left side.   Pulmonary/Chest: Effort normal and breath sounds normal. No respiratory distress. He has no wheezes. He has no rales. He exhibits no tenderness.   Minor substernal muscular diathesis.    Abdominal: Soft. Normal appearance. He exhibits no pulsatile liver. There is no hepatomegaly. There is no tenderness. There is no rebound and no guarding.   Musculoskeletal: Normal range of motion. He exhibits no edema or tenderness.        Right knee: He exhibits no swelling.        Left knee: He exhibits no swelling.        Right ankle: He exhibits no swelling.        Left ankle: He exhibits no swelling.        Right lower leg: He  "exhibits no swelling.        Left lower leg: He exhibits no swelling.        Right foot: There is no swelling.        Left foot: There is no swelling.   Neurological: He is alert and oriented to person, place, and time. He has normal strength and normal reflexes. No cranial nerve deficit. Coordination normal.   Skin: Skin is warm, dry and intact. No rash noted. He is not diaphoretic. No cyanosis.   Psychiatric: He has a normal mood and affect. His behavior is normal.   Nursing note and vitals reviewed.    /74   Pulse 79   Ht 5' 9" (1.753 m)   Wt 80 kg (176 lb 5.9 oz)   BMI 26.05 kg/m²      Assessment:    Doing very well - excellent risk modification   1. S/P CABG x 3    2. Mixed hyperlipidemia        Plan:    Keep same  Will get stress test   Will keep Brelenta yet   Orders Placed This Encounter   Procedures    Echocardiogram stress test (Cupid Only)     Standing Status:   Future     Standing Expiration Date:   12/27/2019     Order Specific Question:   Which stress agent will be used     Answer:   Exercise     Follow-up in about 1 year (around 12/27/2019), or if symptoms worsen or fail to improve.  There are no discontinued medications.     This SmartLink is deprecated. Use [employer] instead to display the medication list for a patient.           "

## 2018-12-27 NOTE — PROGRESS NOTES
"  Subjective:   Patient ID:  Fausto Patterson is a 48 y.o. male     Chief complaint:S/P CABG x 3      HPI  New patient to me.  Referred by Dr lopez    --   From his records:  Current Outpatient Medications   Medication Sig    aspirin (ECOTRIN) 81 MG EC tablet Take 1 tablet (81 mg total) by mouth once daily.    metoprolol succinate (TOPROL-XL) 50 MG 24 hr tablet Take 1 tablet (50 mg total) by mouth once daily.    rosuvastatin (CRESTOR) 40 MG Tab TAKE 1 TABLET (40 MG TOTAL) BY MOUTH ONCE DAILY.    sennosides/docusate sodium (STOOL SOFTENER-LAXATIVE ORAL) Take by mouth.    ticagrelor (BRILINTA) 90 mg tablet Take 1 tablet (90 mg total) by mouth 2 (two) times daily.     No current facility-administered medications for this visit.      Review of Systems   Constitution: Positive for malaise/fatigue.       Objective:   Physical Exam  /74   Pulse 79   Ht 5' 9" (1.753 m)   Wt 80 kg (176 lb 5.9 oz)   BMI 26.05 kg/m²      Assessment:      No diagnosis found.    Plan:      No orders of the defined types were placed in this encounter.    No Follow-up on file.  There are no discontinued medications.  Outpatient Encounter Medications as of 12/27/2018   Medication Sig Dispense Refill    aspirin (ECOTRIN) 81 MG EC tablet Take 1 tablet (81 mg total) by mouth once daily.  0    metoprolol succinate (TOPROL-XL) 50 MG 24 hr tablet Take 1 tablet (50 mg total) by mouth once daily. 90 tablet 3    rosuvastatin (CRESTOR) 40 MG Tab TAKE 1 TABLET (40 MG TOTAL) BY MOUTH ONCE DAILY. 90 tablet 3    sennosides/docusate sodium (STOOL SOFTENER-LAXATIVE ORAL) Take by mouth.      ticagrelor (BRILINTA) 90 mg tablet Take 1 tablet (90 mg total) by mouth 2 (two) times daily. 60 tablet 11     No facility-administered encounter medications on file as of 12/27/2018.      This SmartLink is deprecated. Use [employer] instead to display the medication list for a patient.             "

## 2019-04-22 RX ORDER — METOPROLOL SUCCINATE 50 MG/1
50 TABLET, EXTENDED RELEASE ORAL DAILY
Qty: 90 TABLET | Refills: 0 | Status: SHIPPED | OUTPATIENT
Start: 2019-04-22 | End: 2019-07-18 | Stop reason: SDUPTHER

## 2019-07-18 RX ORDER — METOPROLOL SUCCINATE 50 MG/1
TABLET, EXTENDED RELEASE ORAL
Qty: 90 TABLET | Refills: 0 | Status: SHIPPED | OUTPATIENT
Start: 2019-07-18 | End: 2019-10-13 | Stop reason: SDUPTHER

## 2019-10-07 DIAGNOSIS — Z00.00 ANNUAL PHYSICAL EXAM: Primary | ICD-10-CM

## 2019-10-07 RX ORDER — TICAGRELOR 90 MG/1
TABLET ORAL
Qty: 60 TABLET | Refills: 11 | Status: SHIPPED | OUTPATIENT
Start: 2019-10-07 | End: 2019-12-20 | Stop reason: ALTCHOICE

## 2019-10-13 RX ORDER — METOPROLOL SUCCINATE 50 MG/1
TABLET, EXTENDED RELEASE ORAL
Qty: 90 TABLET | Refills: 0 | Status: SHIPPED | OUTPATIENT
Start: 2019-10-13 | End: 2019-12-23

## 2019-10-15 NOTE — TELEPHONE ENCOUNTER
No answer - left message on voicemail with # for PCW to call and schedule his annual visit and labs prior to that.

## 2019-10-29 ENCOUNTER — TELEPHONE (OUTPATIENT)
Dept: CARDIOLOGY | Facility: CLINIC | Age: 49
End: 2019-10-29

## 2019-10-29 ENCOUNTER — TELEPHONE (OUTPATIENT)
Dept: INTERNAL MEDICINE | Facility: CLINIC | Age: 49
End: 2019-10-29

## 2019-10-29 DIAGNOSIS — R00.2 PALPITATIONS: Primary | ICD-10-CM

## 2019-10-29 NOTE — TELEPHONE ENCOUNTER
Pt is aware that we have nothing sooner for an annual and I placed him on the wait list till something is available

## 2019-10-29 NOTE — TELEPHONE ENCOUNTER
----- Message from Vanessa Suárez sent at 10/29/2019  9:39 AM CDT -----  Contact: Patient/ [phone]  Caller is requesting an earlier appt than we can schedule.  Caller declined first available appointment listed below. Caller will not accept being placed on the wait list and is requesting a message be sent to the provider.  When is the next available appointment:  01/13/2020  Did you offer to schedule the next available appt and put the patient on the wait list?:   Yes/no  What visit type: Annual  Symptoms:    Patient preference of timeframe to be scheduled:  December  What is the reason the patient is requesting a sooner appointment? (insurance terminating, changing jobs):    Would the patient rather a call back or a response via [facility]?:  Call back  Comments:

## 2019-11-09 RX ORDER — ROSUVASTATIN CALCIUM 40 MG/1
40 TABLET, COATED ORAL DAILY
Qty: 90 TABLET | Refills: 3 | Status: SHIPPED | OUTPATIENT
Start: 2019-11-09 | End: 2020-01-08 | Stop reason: SDUPTHER

## 2019-12-20 ENCOUNTER — HOSPITAL ENCOUNTER (OUTPATIENT)
Dept: CARDIOLOGY | Facility: CLINIC | Age: 49
Discharge: HOME | End: 2019-12-20
Attending: INTERNAL MEDICINE
Payer: COMMERCIAL

## 2019-12-20 ENCOUNTER — OFFICE VISIT (OUTPATIENT)
Dept: CARDIOLOGY | Facility: CLINIC | Age: 49
End: 2019-12-20
Payer: COMMERCIAL

## 2019-12-20 ENCOUNTER — LAB VISIT (OUTPATIENT)
Dept: LAB | Facility: HOSPITAL | Age: 49
End: 2019-12-20
Attending: INTERNAL MEDICINE
Payer: COMMERCIAL

## 2019-12-20 ENCOUNTER — HOSPITAL ENCOUNTER (OUTPATIENT)
Dept: CARDIOLOGY | Facility: CLINIC | Age: 49
Discharge: HOME | End: 2019-12-20
Payer: COMMERCIAL

## 2019-12-20 VITALS
SYSTOLIC BLOOD PRESSURE: 120 MMHG | BODY MASS INDEX: 27.6 KG/M2 | WEIGHT: 186.31 LBS | HEART RATE: 72 BPM | DIASTOLIC BLOOD PRESSURE: 80 MMHG | OXYGEN SATURATION: 99 % | HEIGHT: 69 IN

## 2019-12-20 DIAGNOSIS — I25.10 CORONARY ARTERY DISEASE INVOLVING NATIVE CORONARY ARTERY OF NATIVE HEART WITHOUT ANGINA PECTORIS: ICD-10-CM

## 2019-12-20 DIAGNOSIS — E78.2 MIXED HYPERLIPIDEMIA: ICD-10-CM

## 2019-12-20 DIAGNOSIS — R00.2 PALPITATIONS: ICD-10-CM

## 2019-12-20 DIAGNOSIS — K22.2 SCHATZKI'S RING: ICD-10-CM

## 2019-12-20 DIAGNOSIS — Z95.1 S/P CABG X 3: ICD-10-CM

## 2019-12-20 DIAGNOSIS — R07.2 PRECORDIAL PAIN: Primary | ICD-10-CM

## 2019-12-20 DIAGNOSIS — R07.2 PRECORDIAL PAIN: ICD-10-CM

## 2019-12-20 LAB
ALBUMIN SERPL BCP-MCNC: 4.5 G/DL (ref 3.5–5.2)
ALP SERPL-CCNC: 63 U/L (ref 55–135)
ALT SERPL W/O P-5'-P-CCNC: 26 U/L (ref 10–44)
ANION GAP SERPL CALC-SCNC: 8 MMOL/L (ref 8–16)
AST SERPL-CCNC: 20 U/L (ref 10–40)
BASOPHILS # BLD AUTO: 0.05 K/UL (ref 0–0.2)
BASOPHILS NFR BLD: 0.8 % (ref 0–1.9)
BILIRUB SERPL-MCNC: 1.3 MG/DL (ref 0.1–1)
BUN SERPL-MCNC: 14 MG/DL (ref 6–20)
CALCIUM SERPL-MCNC: 10 MG/DL (ref 8.7–10.5)
CHLORIDE SERPL-SCNC: 105 MMOL/L (ref 95–110)
CHOLEST SERPL-MCNC: 142 MG/DL (ref 120–199)
CHOLEST/HDLC SERPL: 2.8 {RATIO} (ref 2–5)
CO2 SERPL-SCNC: 27 MMOL/L (ref 23–29)
CREAT SERPL-MCNC: 1.3 MG/DL (ref 0.5–1.4)
CV STRESS BASE HR: 68 BPM
DIASTOLIC BLOOD PRESSURE: 79 MMHG
DIFFERENTIAL METHOD: ABNORMAL
EOSINOPHIL # BLD AUTO: 0.3 K/UL (ref 0–0.5)
EOSINOPHIL NFR BLD: 5.3 % (ref 0–8)
ERYTHROCYTE [DISTWIDTH] IN BLOOD BY AUTOMATED COUNT: 12.7 % (ref 11.5–14.5)
EST. GFR  (AFRICAN AMERICAN): >60 ML/MIN/1.73 M^2
EST. GFR  (NON AFRICAN AMERICAN): >60 ML/MIN/1.73 M^2
GLUCOSE SERPL-MCNC: 95 MG/DL (ref 70–110)
HCT VFR BLD AUTO: 51.3 % (ref 40–54)
HDLC SERPL-MCNC: 51 MG/DL (ref 40–75)
HDLC SERPL: 35.9 % (ref 20–50)
HGB BLD-MCNC: 17 G/DL (ref 14–18)
IMM GRANULOCYTES # BLD AUTO: 0.02 K/UL (ref 0–0.04)
IMM GRANULOCYTES NFR BLD AUTO: 0.3 % (ref 0–0.5)
LDLC SERPL CALC-MCNC: 67.4 MG/DL (ref 63–159)
LYMPHOCYTES # BLD AUTO: 1.6 K/UL (ref 1–4.8)
LYMPHOCYTES NFR BLD: 27 % (ref 18–48)
MCH RBC QN AUTO: 29.6 PG (ref 27–31)
MCHC RBC AUTO-ENTMCNC: 33.1 G/DL (ref 32–36)
MCV RBC AUTO: 89 FL (ref 82–98)
MONOCYTES # BLD AUTO: 0.7 K/UL (ref 0.3–1)
MONOCYTES NFR BLD: 10.9 % (ref 4–15)
NEUTROPHILS # BLD AUTO: 3.4 K/UL (ref 1.8–7.7)
NEUTROPHILS NFR BLD: 55.7 % (ref 38–73)
NONHDLC SERPL-MCNC: 91 MG/DL
NRBC BLD-RTO: 0 /100 WBC
OHS CV CPX 1 MINUTE RECOVERY HEART RATE: 141 BPM
OHS CV CPX 85 PERCENT MAX PREDICTED HEART RATE MALE: 145
OHS CV CPX ESTIMATED METS: 15
OHS CV CPX MAX PREDICTED HEART RATE: 170
OHS CV CPX PATIENT IS FEMALE: 0
OHS CV CPX PATIENT IS MALE: 1
OHS CV CPX PEAK DIASTOLIC BLOOD PRESSURE: 78 MMHG
OHS CV CPX PEAK HEAR RATE: 157 BPM
OHS CV CPX PEAK RATE PRESSURE PRODUCT: NORMAL
OHS CV CPX PEAK SYSTOLIC BLOOD PRESSURE: 163 MMHG
OHS CV CPX PERCENT MAX PREDICTED HEART RATE ACHIEVED: 92
OHS CV CPX RATE PRESSURE PRODUCT PRESENTING: 9180
PLATELET # BLD AUTO: 217 K/UL (ref 150–350)
PMV BLD AUTO: 9 FL (ref 9.2–12.9)
POTASSIUM SERPL-SCNC: 4.3 MMOL/L (ref 3.5–5.1)
PROT SERPL-MCNC: 7.8 G/DL (ref 6–8.4)
RBC # BLD AUTO: 5.74 M/UL (ref 4.6–6.2)
SODIUM SERPL-SCNC: 140 MMOL/L (ref 136–145)
STRESS ECHO POST EXERCISE DUR MIN: 9 MINUTES
STRESS ECHO POST EXERCISE DUR SEC: 1 SECONDS
STRESS ECHO TARGET HR: 145 BPM
SYSTOLIC BLOOD PRESSURE: 135 MMHG
TRIGL SERPL-MCNC: 118 MG/DL (ref 30–150)
WBC # BLD AUTO: 6.08 K/UL (ref 3.9–12.7)

## 2019-12-20 PROCEDURE — 99999 PR PBB SHADOW E&M-EST. PATIENT-LVL III: CPT | Mod: PBBFAC,,, | Performed by: INTERNAL MEDICINE

## 2019-12-20 PROCEDURE — 99999 PR PBB SHADOW E&M-EST. PATIENT-LVL III: ICD-10-PCS | Mod: PBBFAC,,, | Performed by: INTERNAL MEDICINE

## 2019-12-20 PROCEDURE — 99214 OFFICE O/P EST MOD 30 MIN: CPT | Mod: S$GLB,,, | Performed by: INTERNAL MEDICINE

## 2019-12-20 PROCEDURE — 93015 CV STRESS TEST SUPVJ I&R: CPT | Mod: S$GLB,,, | Performed by: INTERNAL MEDICINE

## 2019-12-20 PROCEDURE — 93000 EKG 12-LEAD: ICD-10-PCS | Mod: S$GLB,,, | Performed by: INTERNAL MEDICINE

## 2019-12-20 PROCEDURE — 93015 EXERCISE STRESS - EKG (CUPID ONLY): ICD-10-PCS | Mod: S$GLB,,, | Performed by: INTERNAL MEDICINE

## 2019-12-20 PROCEDURE — 36415 COLL VENOUS BLD VENIPUNCTURE: CPT

## 2019-12-20 PROCEDURE — 99214 PR OFFICE/OUTPT VISIT, EST, LEVL IV, 30-39 MIN: ICD-10-PCS | Mod: S$GLB,,, | Performed by: INTERNAL MEDICINE

## 2019-12-20 PROCEDURE — 80053 COMPREHEN METABOLIC PANEL: CPT

## 2019-12-20 PROCEDURE — 85025 COMPLETE CBC W/AUTO DIFF WBC: CPT

## 2019-12-20 PROCEDURE — 3008F BODY MASS INDEX DOCD: CPT | Mod: CPTII,S$GLB,, | Performed by: INTERNAL MEDICINE

## 2019-12-20 PROCEDURE — 3008F PR BODY MASS INDEX (BMI) DOCUMENTED: ICD-10-PCS | Mod: CPTII,S$GLB,, | Performed by: INTERNAL MEDICINE

## 2019-12-20 PROCEDURE — 80061 LIPID PANEL: CPT

## 2019-12-20 PROCEDURE — 93000 ELECTROCARDIOGRAM COMPLETE: CPT | Mod: S$GLB,,, | Performed by: INTERNAL MEDICINE

## 2019-12-20 NOTE — PROGRESS NOTES
Subjective:    Patient ID:  Fausto Patterson is a 49 y.o. male who presents for evaluation of     HPI     The patient is a 49 year old male  wiht CAD post CABG X3 10/6/17 followed by Dr Middleton.He has noted 2 episodes of precordial  tightness occurring mid day lasting 2 hours and 2/10 in severity . The discomfort is not effort of food related. He exercises regularly . He is a non smoker and no hypertension or diabetes.He has a very strong family history of CAD in both parents.  Lab Results   Component Value Date     11/09/2018    K 5.1 11/09/2018     11/09/2018    CO2 26 11/09/2018    BUN 21 (H) 11/09/2018    CREATININE 1.2 11/09/2018    GLU 98 11/09/2018    HGBA1C 5.3 10/06/2017    MG 2.3 10/11/2017    AST 22 11/09/2018    ALT 24 11/09/2018    ALBUMIN 4.4 11/09/2018    PROT 7.7 11/09/2018    BILITOT 1.7 (H) 11/09/2018    WBC 5.86 11/09/2018    HGB 16.4 11/09/2018    HCT 49.7 11/09/2018    HCT 34 (L) 10/06/2017    MCV 88 11/09/2018     11/09/2018    INR 1.1 10/07/2017    TSH 1.68 09/15/2009         Lab Results   Component Value Date    CHOL 131 11/09/2018    CHOL 131 11/09/2018    HDL 45 11/09/2018    HDL 45 11/09/2018    TRIG 114 11/09/2018    TRIG 114 11/09/2018       Lab Results   Component Value Date    LDLCALC 63.2 11/09/2018    LDLCALC 63.2 11/09/2018       Past Medical History:   Diagnosis Date    CAD (coronary artery disease)     Hyperlipidemia     Kidney stone     Schatzki's ring     Unspecified disorder of kidney and ureter     calcium oxalate nephrolithiasis       Current Outpatient Medications:     metoprolol succinate (TOPROL-XL) 50 MG 24 hr tablet, TAKE 1 TABLET BY MOUTH EVERY DAY, Disp: 90 tablet, Rfl: 0    sennosides/docusate sodium (STOOL SOFTENER-LAXATIVE ORAL), Take by mouth., Disp: , Rfl:     aspirin (ECOTRIN) 81 MG EC tablet, Take 1 tablet (81 mg total) by mouth once daily., Disp: , Rfl: 0    rosuvastatin (CRESTOR) 40 MG Tab, TAKE 1 TABLET (40 MG TOTAL) BY MOUTH ONCE  "DAILY., Disp: 90 tablet, Rfl: 3          Review of Systems   Constitution: Negative for decreased appetite, diaphoresis, fever, malaise/fatigue, weight gain and weight loss.   HENT: Negative for congestion, ear discharge, ear pain and nosebleeds.    Eyes: Negative for blurred vision, double vision and visual disturbance.   Cardiovascular: Positive for chest pain. Negative for claudication, cyanosis, dyspnea on exertion, irregular heartbeat, leg swelling, near-syncope, orthopnea, palpitations, paroxysmal nocturnal dyspnea and syncope.   Respiratory: Negative for cough, hemoptysis, shortness of breath, sleep disturbances due to breathing, snoring, sputum production and wheezing.    Endocrine: Negative for polydipsia, polyphagia and polyuria.   Hematologic/Lymphatic: Negative for adenopathy and bleeding problem. Does not bruise/bleed easily.   Skin: Negative for color change, nail changes, poor wound healing and rash.   Musculoskeletal: Negative for muscle cramps and muscle weakness.   Gastrointestinal: Negative for abdominal pain, anorexia, change in bowel habit, hematochezia, nausea and vomiting.   Genitourinary: Negative for dysuria, frequency and hematuria.   Neurological: Negative for brief paralysis, difficulty with concentration, excessive daytime sleepiness, dizziness, focal weakness, headaches, light-headedness, seizures, vertigo and weakness.   Psychiatric/Behavioral: Negative for altered mental status and depression.   Allergic/Immunologic: Negative for persistent infections.        Objective:/80   Pulse 72   Ht 5' 9" (1.753 m)   Wt 84.5 kg (186 lb 4.6 oz)   SpO2 99%   BMI 27.51 kg/m²             Physical Exam   Constitutional: He is oriented to person, place, and time. He appears well-developed and well-nourished.   HENT:   Head: Normocephalic.   Right Ear: External ear normal.   Left Ear: External ear normal.   Nose: Nose normal.   Inspection of lips, teeth and gums normal   Eyes: Pupils are " equal, round, and reactive to light. EOM are normal. No scleral icterus.   Neck: Normal range of motion. Neck supple. No JVD present. No tracheal deviation present. No thyromegaly present.   Cardiovascular: Normal rate, regular rhythm and intact distal pulses. Exam reveals no gallop and no friction rub.   No murmur heard.  Pulses:       Carotid pulses are 2+ on the right side, and 2+ on the left side.       Dorsalis pedis pulses are 2+ on the left side.        Posterior tibial pulses are 2+ on the right side, and 2+ on the left side.   Pulmonary/Chest: Effort normal and breath sounds normal.       Abdominal: Bowel sounds are normal. He exhibits no distension. There is no hepatosplenomegaly. There is no tenderness. There is no guarding.   Musculoskeletal: Normal range of motion. He exhibits no edema or tenderness.   Lymphadenopathy:   Palpation of neck and groin lymph nodes normal   Neurological: He is alert and oriented to person, place, and time. No cranial nerve deficit. He exhibits normal muscle tone. Coordination normal.   Skin: Skin is dry.   Palpation of skin normal   Psychiatric: His behavior is normal. Judgment and thought content normal.         Assessment:       1. Precordial pain    2. Coronary artery disease involving native coronary artery of native heart without angina pectoris    3. Mixed hyperlipidemia    4. S/P CABG x 3    5. Schatzki's ring         Plan:       Fausto was seen today for coronary artery disease.    Diagnoses and all orders for this visit:    Precordial pain  -     Exercise Stress - EKG; Future    Coronary artery disease involving native coronary artery of native heart without angina pectoris  -     CBC auto differential; Future; Expected date: 12/20/2019  -     Comprehensive metabolic panel; Future; Expected date: 12/20/2019  -     Lipid panel; Future; Expected date: 12/20/2019  -     Exercise Stress - EKG; Future    Mixed hyperlipidemia  -     Lipid panel; Future; Expected date:  12/20/2019    S/P CABG x 3    Schatzki's ring                 Mohs Histo Method Verbiage: Each section was then chromacoded and processed in the Mohs lab using the Mohs protocol and submitted for frozen section.

## 2019-12-23 RX ORDER — METOPROLOL SUCCINATE 50 MG/1
TABLET, EXTENDED RELEASE ORAL
Qty: 90 TABLET | Refills: 3 | Status: SHIPPED | OUTPATIENT
Start: 2019-12-23 | End: 2020-01-08 | Stop reason: SDUPTHER

## 2020-01-05 ENCOUNTER — PATIENT MESSAGE (OUTPATIENT)
Dept: INTERNAL MEDICINE | Facility: CLINIC | Age: 50
End: 2020-01-05

## 2020-01-05 DIAGNOSIS — Z00.00 ANNUAL PHYSICAL EXAM: Primary | ICD-10-CM

## 2020-01-08 ENCOUNTER — OFFICE VISIT (OUTPATIENT)
Dept: INTERNAL MEDICINE | Facility: CLINIC | Age: 50
End: 2020-01-08
Payer: COMMERCIAL

## 2020-01-08 VITALS
WEIGHT: 184.06 LBS | SYSTOLIC BLOOD PRESSURE: 120 MMHG | HEART RATE: 89 BPM | OXYGEN SATURATION: 97 % | DIASTOLIC BLOOD PRESSURE: 84 MMHG | HEIGHT: 69 IN | BODY MASS INDEX: 27.26 KG/M2

## 2020-01-08 DIAGNOSIS — N20.0 CALCIUM OXALATE KIDNEY STONES: ICD-10-CM

## 2020-01-08 DIAGNOSIS — Z00.00 ANNUAL PHYSICAL EXAM: ICD-10-CM

## 2020-01-08 DIAGNOSIS — Z01.00 ROUTINE EYE EXAM: ICD-10-CM

## 2020-01-08 DIAGNOSIS — I25.10 CORONARY ARTERY DISEASE INVOLVING NATIVE CORONARY ARTERY OF NATIVE HEART WITHOUT ANGINA PECTORIS: ICD-10-CM

## 2020-01-08 DIAGNOSIS — Z12.11 COLON CANCER SCREENING: ICD-10-CM

## 2020-01-08 DIAGNOSIS — M25.521 PAIN OF BOTH ELBOWS: Primary | ICD-10-CM

## 2020-01-08 DIAGNOSIS — M25.522 PAIN OF BOTH ELBOWS: Primary | ICD-10-CM

## 2020-01-08 PROBLEM — R07.2 PRECORDIAL PAIN: Status: RESOLVED | Noted: 2019-12-20 | Resolved: 2020-01-08

## 2020-01-08 PROCEDURE — 99999 PR PBB SHADOW E&M-EST. PATIENT-LVL III: CPT | Mod: PBBFAC,,, | Performed by: INTERNAL MEDICINE

## 2020-01-08 PROCEDURE — 99396 PREV VISIT EST AGE 40-64: CPT | Mod: S$GLB,,, | Performed by: INTERNAL MEDICINE

## 2020-01-08 PROCEDURE — 99999 PR PBB SHADOW E&M-EST. PATIENT-LVL III: ICD-10-PCS | Mod: PBBFAC,,, | Performed by: INTERNAL MEDICINE

## 2020-01-08 PROCEDURE — 99396 PR PREVENTIVE VISIT,EST,40-64: ICD-10-PCS | Mod: S$GLB,,, | Performed by: INTERNAL MEDICINE

## 2020-01-08 RX ORDER — METOPROLOL SUCCINATE 50 MG/1
50 TABLET, EXTENDED RELEASE ORAL DAILY
Qty: 90 TABLET | Refills: 3 | Status: SHIPPED | OUTPATIENT
Start: 2020-01-08 | End: 2020-12-10 | Stop reason: SDUPTHER

## 2020-01-08 RX ORDER — ROSUVASTATIN CALCIUM 40 MG/1
40 TABLET, COATED ORAL DAILY
Qty: 90 TABLET | Refills: 3 | Status: SHIPPED | OUTPATIENT
Start: 2020-01-08 | End: 2020-12-10 | Stop reason: SDUPTHER

## 2020-01-08 NOTE — PROGRESS NOTES
Subjective:       Patient ID: Fausto Patterson is a 49 y.o. male.    Chief Complaint: Annual Exam    HPI   Had some CP in Dec - saw Dr Sullivan.  Neg stress test.    Brilinta stopped.    Passed 2 kidney stones recently. Bleeding and groin pain before passage.  No urinary symptoms now.    Recent pain medial elbow.  Pain when he extends hands.  Began 1 year ago.  Applies diclofenac gel.    Infreq dysphagia.  Had schatzki's ring dilated.     Lids feel heavy.    Review of Systems   Constitutional: Negative for activity change and unexpected weight change.   HENT: Positive for rhinorrhea and trouble swallowing. Negative for hearing loss.    Eyes: Positive for visual disturbance. Negative for discharge.   Respiratory: Negative for chest tightness and wheezing.    Cardiovascular: Negative for chest pain and palpitations.   Gastrointestinal: Negative for constipation, diarrhea and vomiting.   Endocrine: Negative for polydipsia and polyuria.   Genitourinary: Positive for hematuria. Negative for difficulty urinating and urgency.   Musculoskeletal: Positive for arthralgias. Negative for joint swelling and neck pain.   Neurological: Negative for weakness and headaches.   Psychiatric/Behavioral: Negative for confusion and dysphoric mood.       Objective:      Physical Exam   Constitutional: He appears well-developed and well-nourished. No distress.        Neck: Neck supple. No JVD present.   Cardiovascular: Regular rhythm.   Pulmonary/Chest: Effort normal. No respiratory distress.   Abdominal: He exhibits no mass. There is no tenderness.   Musculoskeletal: He exhibits no edema.   Psychiatric: He has a normal mood and affect.       Lab Results   Component Value Date    WBC 6.08 12/20/2019    HGB 17.0 12/20/2019    HCT 51.3 12/20/2019     12/20/2019    CHOL 142 12/20/2019    TRIG 118 12/20/2019    HDL 51 12/20/2019    ALT 26 12/20/2019    AST 20 12/20/2019     12/20/2019    K 4.3 12/20/2019     12/20/2019     CREATININE 1.3 12/20/2019    BUN 14 12/20/2019    CO2 27 12/20/2019    TSH 1.68 09/15/2009    INR 1.1 10/07/2017    GLUF 100 03/05/2018    HGBA1C 5.3 10/06/2017     Assessment:       1. Pain of both elbows    2. Annual physical exam    3. Calcium oxalate kidney stones    4. Coronary artery disease involving native coronary artery of native heart without angina pectoris    5. Routine eye exam    6. Colon cancer screening        Plan:       Fausto was seen today for annual exam.    Diagnoses and all orders for this visit:    Pain of both elbows    Annual physical exam    Calcium oxalate kidney stones    Coronary artery disease involving native coronary artery of native heart without angina pectoris    Routine eye exam  -     Ambulatory consult to Optometry    Colon cancer screening  -     Case request GI: COLONOSCOPY    Other orders  -     metoprolol succinate (TOPROL-XL) 50 MG 24 hr tablet; Take 1 tablet (50 mg total) by mouth once daily.       Declines ortho appt    PSA next visit

## 2020-02-16 ENCOUNTER — PATIENT OUTREACH (OUTPATIENT)
Dept: ADMINISTRATIVE | Facility: OTHER | Age: 50
End: 2020-02-16

## 2020-02-17 ENCOUNTER — OFFICE VISIT (OUTPATIENT)
Dept: OPTOMETRY | Facility: CLINIC | Age: 50
End: 2020-02-17
Payer: COMMERCIAL

## 2020-02-17 DIAGNOSIS — H01.004 BLEPHARITIS OF UPPER EYELIDS OF BOTH EYES, UNSPECIFIED TYPE: Primary | ICD-10-CM

## 2020-02-17 DIAGNOSIS — H52.01 HYPEROPIA WITH PRESBYOPIA OF RIGHT EYE: ICD-10-CM

## 2020-02-17 DIAGNOSIS — H52.02 HYPEROPIA OF LEFT EYE WITH ASTIGMATISM AND PRESBYOPIA: ICD-10-CM

## 2020-02-17 DIAGNOSIS — H52.4 HYPEROPIA OF LEFT EYE WITH ASTIGMATISM AND PRESBYOPIA: ICD-10-CM

## 2020-02-17 DIAGNOSIS — H25.13 NUCLEAR SCLEROSIS OF BOTH EYES: ICD-10-CM

## 2020-02-17 DIAGNOSIS — H52.4 HYPEROPIA WITH PRESBYOPIA OF RIGHT EYE: ICD-10-CM

## 2020-02-17 DIAGNOSIS — H52.202 HYPEROPIA OF LEFT EYE WITH ASTIGMATISM AND PRESBYOPIA: ICD-10-CM

## 2020-02-17 DIAGNOSIS — H01.001 BLEPHARITIS OF UPPER EYELIDS OF BOTH EYES, UNSPECIFIED TYPE: Primary | ICD-10-CM

## 2020-02-17 PROCEDURE — 92015 DETERMINE REFRACTIVE STATE: CPT | Mod: S$GLB,,, | Performed by: OPTOMETRIST

## 2020-02-17 PROCEDURE — 92004 PR EYE EXAM, NEW PATIENT,COMPREHESV: ICD-10-PCS | Mod: S$GLB,,, | Performed by: OPTOMETRIST

## 2020-02-17 PROCEDURE — 99999 PR PBB SHADOW E&M-EST. PATIENT-LVL II: ICD-10-PCS | Mod: PBBFAC,,, | Performed by: OPTOMETRIST

## 2020-02-17 PROCEDURE — 92015 PR REFRACTION: ICD-10-PCS | Mod: S$GLB,,, | Performed by: OPTOMETRIST

## 2020-02-17 PROCEDURE — 92004 COMPRE OPH EXAM NEW PT 1/>: CPT | Mod: S$GLB,,, | Performed by: OPTOMETRIST

## 2020-02-17 PROCEDURE — 99999 PR PBB SHADOW E&M-EST. PATIENT-LVL II: CPT | Mod: PBBFAC,,, | Performed by: OPTOMETRIST

## 2020-02-17 NOTE — PROGRESS NOTES
HPI     Mr. Fausto Patterson was referred by self for a complete eye exam    Patient complains of problems with near vision, uses otc reading glasses   +3.00    Would patient like a refraction today? yes     Maggietent denies diplopia, headaches, flashes/floaters, itching, tearing,   burning, redness, and pain.    (-)drops    (-)Diabetes              OCULAR HISTORY  Last Eye Exam: 2012 with Dr Walters  (-)eye surgery   (-)diagnosed or treated for any eye conditions or diseases n/a    FAMILY HISTORY  (-)Glaucoma        Last edited by Mariajose Bryson, OD on 2/17/2020  3:54 PM. (History)            Assessment /Plan     For exam results, see Encounter Report.    Blepharitis of upper eyelids of both eyes, unspecified type    Nuclear sclerosis of both eyes    Hyperopia with presbyopia of right eye    Hyperopia of left eye with astigmatism and presbyopia      1. Educated pt. Recommend J&J baby shampoo or lid scrubs along eyelid margin Qday + ATs prn. Monitor.     2. Trace NS OU. Not visually significant. No need for removal at this time. Monitor yearly.     3-4. Updated SRx. First time PAL wearer, educated on symptoms. Also given option of NVO SRx or OTC reading glasses only. Pt interested in PALs. Monitor yearly.       RTC in 1 year for annual eye exam or sooner if needed.

## 2020-02-17 NOTE — PROGRESS NOTES
Chart reviewed.   Immunizations: updated  Orders placed: n/a  Upcoming appts to satisfy DAREN topics: n/a  Open case request for Colonoscopy.

## 2020-02-17 NOTE — LETTER
February 17, 2020      Chelsea Callahan MD  [address]  [facility] [address]           [facility]  [address]  [facility] [address]  Phone: [phone]          Patient: Fausto Patterson   MR Number: [id number]   YOB: 1970   Date of Visit: 2/17/2020       Dear Dr. Chelsea Callahan:    Thank you for referring Fausto Patterson to me for evaluation. Attached you will find relevant portions of my assessment and plan of care.    If you have questions, please do not hesitate to call me. I look forward to following Fausto Patterson along with you.    Sincerely,    Mariajose Bryson, OD    Enclosure  CC:  No Recipients    If you would like to receive this communication electronically, please contact [email or web address] or [phone] to request more information on [employer] access.    For providers and/or their staff who would like to refer a patient to [facility], please contact us through our one-stop-shop provider referral line, [facility], at [phone].    If you feel you have received this communication in error or would no longer like to receive these types of communications, please e-mail [email or web address]

## 2020-03-30 ENCOUNTER — TELEPHONE (OUTPATIENT)
Dept: INTERNAL MEDICINE | Facility: CLINIC | Age: 50
End: 2020-03-30

## 2020-03-30 RX ORDER — HYDROCODONE BITARTRATE AND ACETAMINOPHEN 5; 325 MG/1; MG/1
TABLET ORAL
Qty: 30 TABLET | Refills: 0 | Status: SHIPPED | OUTPATIENT
Start: 2020-03-30 | End: 2022-07-18

## 2020-03-30 NOTE — TELEPHONE ENCOUNTER
He can come here for IVF, but I don't have any strong pain meds.  He should start flomax - does he have any?  Would he like appt?

## 2020-03-30 NOTE — TELEPHONE ENCOUNTER
Spoke with pt wife, pt has history of kidney stones and he feels this is the same type of pain, he has had triple bypass and is trying to avoid ER.    She stated that pt has vomited once.     He does not want to take flomax because it makes him sick.      Pt opted not to come for ivf and will hydrate by drinking water for now. He will call if he changes his mind,  I explained that try to call before 3 to get an appt.   Pt wife verbalized understanding.

## 2020-03-30 NOTE — TELEPHONE ENCOUNTER
----- Message from Cathryn Santana sent at 3/30/2020  8:43 AM CDT -----  Contact: [phone]/ Millicent/wife  Patient is requesting a call from the office regarding the patient history of kidney stones.  Patient thinks he may have another kidney stone, he is in a lot of pain.  Where can he go to get IV fluid if needed beside the ER?    Please advise, thank you

## 2020-04-02 ENCOUNTER — TELEPHONE (OUTPATIENT)
Dept: UROLOGY | Facility: CLINIC | Age: 50
End: 2020-04-02

## 2020-04-02 ENCOUNTER — HOSPITAL ENCOUNTER (OUTPATIENT)
Dept: RADIOLOGY | Facility: OTHER | Age: 50
Discharge: HOME | End: 2020-04-02
Attending: UROLOGY
Payer: COMMERCIAL

## 2020-04-02 DIAGNOSIS — N20.1 URETERAL CALCULUS: ICD-10-CM

## 2020-04-02 DIAGNOSIS — R10.9 FLANK PAIN: ICD-10-CM

## 2020-04-02 DIAGNOSIS — N20.1 URETERAL CALCULUS: Primary | ICD-10-CM

## 2020-04-02 PROCEDURE — 74018 XR ABDOMEN AP 1 VIEW: ICD-10-PCS | Mod: 26,,, | Performed by: RADIOLOGY

## 2020-04-02 PROCEDURE — 74018 RADEX ABDOMEN 1 VIEW: CPT | Mod: TC,FY

## 2020-04-02 PROCEDURE — 74176 CT RENAL STONE STUDY ABD PELVIS WO: ICD-10-PCS | Mod: 26,,, | Performed by: RADIOLOGY

## 2020-04-02 PROCEDURE — 74176 CT ABD & PELVIS W/O CONTRAST: CPT | Mod: 26,,, | Performed by: RADIOLOGY

## 2020-04-02 PROCEDURE — 74176 CT ABD & PELVIS W/O CONTRAST: CPT | Mod: TC

## 2020-04-02 PROCEDURE — 74018 RADEX ABDOMEN 1 VIEW: CPT | Mod: 26,,, | Performed by: RADIOLOGY

## 2020-04-02 RX ORDER — KETOROLAC TROMETHAMINE 10 MG/1
10 TABLET, FILM COATED ORAL EVERY 6 HOURS
Qty: 15 TABLET | Refills: 0 | Status: SHIPPED | OUTPATIENT
Start: 2020-04-02 | End: 2022-07-18

## 2020-07-23 DIAGNOSIS — Z12.11 COLON CANCER SCREENING: Primary | ICD-10-CM

## 2020-09-04 ENCOUNTER — PATIENT OUTREACH (OUTPATIENT)
Dept: ADMINISTRATIVE | Facility: HOSPITAL | Age: 50
End: 2020-09-04

## 2020-10-07 ENCOUNTER — PATIENT OUTREACH (OUTPATIENT)
Dept: ADMINISTRATIVE | Facility: OTHER | Age: 50
End: 2020-10-07

## 2020-10-07 NOTE — PROGRESS NOTES
Health Maintenance Due   Topic Date Due    Hepatitis C Screening  1970    HIV Screening  02/20/1984    Aspirin/Antiplatelet Therapy  02/20/1987    High Dose Statin  02/20/1990    Shingles Vaccine (1 of 2) 02/20/2019    Influenza Vaccine (1) 08/01/2020     Updates were requested from care everywhere.  Chart was reviewed for overdue Proactive [facility] Encounters (DAREN) topics (CRS, Breast Cancer Screening, Eye exam)  Health Maintenance has been updated.  LINKS immunization registry triggered.  Immunizations were reconciled.

## 2020-10-08 ENCOUNTER — HOSPITAL ENCOUNTER (OUTPATIENT)
Dept: RADIOLOGY | Facility: OTHER | Age: 50
Discharge: HOME | End: 2020-10-08
Attending: PODIATRIST
Payer: COMMERCIAL

## 2020-10-08 ENCOUNTER — OFFICE VISIT (OUTPATIENT)
Dept: PODIATRY | Facility: CLINIC | Age: 50
End: 2020-10-08
Payer: COMMERCIAL

## 2020-10-08 VITALS
BODY MASS INDEX: 27.26 KG/M2 | HEART RATE: 74 BPM | SYSTOLIC BLOOD PRESSURE: 139 MMHG | WEIGHT: 184.06 LBS | DIASTOLIC BLOOD PRESSURE: 89 MMHG | HEIGHT: 69 IN

## 2020-10-08 DIAGNOSIS — G89.29 HEEL PAIN, CHRONIC, LEFT: Primary | ICD-10-CM

## 2020-10-08 DIAGNOSIS — M79.672 HEEL PAIN, CHRONIC, LEFT: ICD-10-CM

## 2020-10-08 DIAGNOSIS — M72.2 PLANTAR FASCIITIS: ICD-10-CM

## 2020-10-08 DIAGNOSIS — B35.1 ONYCHOMYCOSIS DUE TO DERMATOPHYTE: ICD-10-CM

## 2020-10-08 DIAGNOSIS — M79.672 HEEL PAIN, CHRONIC, LEFT: Primary | ICD-10-CM

## 2020-10-08 DIAGNOSIS — G89.29 HEEL PAIN, CHRONIC, LEFT: ICD-10-CM

## 2020-10-08 PROCEDURE — 99204 PR OFFICE/OUTPT VISIT, NEW, LEVL IV, 45-59 MIN: ICD-10-PCS | Mod: S$GLB,,, | Performed by: PODIATRIST

## 2020-10-08 PROCEDURE — 73718 MRI LOWER EXTREMITY W/O DYE: CPT | Mod: TC,LT

## 2020-10-08 PROCEDURE — 99999 PR PBB SHADOW E&M-EST. PATIENT-LVL III: CPT | Mod: PBBFAC,,, | Performed by: PODIATRIST

## 2020-10-08 PROCEDURE — 73718 MRI FOOT (HINDFOOT) LEFT WITHOUT CONTRAST: ICD-10-PCS | Mod: 26,LT,, | Performed by: RADIOLOGY

## 2020-10-08 PROCEDURE — 99999 PR PBB SHADOW E&M-EST. PATIENT-LVL III: ICD-10-PCS | Mod: PBBFAC,,, | Performed by: PODIATRIST

## 2020-10-08 PROCEDURE — 99204 OFFICE O/P NEW MOD 45 MIN: CPT | Mod: S$GLB,,, | Performed by: PODIATRIST

## 2020-10-08 PROCEDURE — 73718 MRI LOWER EXTREMITY W/O DYE: CPT | Mod: 26,LT,, | Performed by: RADIOLOGY

## 2020-10-08 RX ORDER — CICLOPIROX 80 MG/ML
SOLUTION TOPICAL NIGHTLY
Qty: 6.6 ML | Refills: 6 | Status: SHIPPED | OUTPATIENT
Start: 2020-10-08 | End: 2022-07-18

## 2020-10-08 NOTE — PROGRESS NOTES
Chief Complaint   Patient presents with    Heel Pain             HPI:       Fausto Patterson is a 50 y.o. male who presents to clinic complaining of left heel pain for about 4 months.   Pain is worse with weight bearing and first few steps after prolonged periods of rest.  Pain is better with rest.    Treatment tried:  Stretching, icing.  No acute trauma but may have over stretched his foot when the pain started.        Patient Active Problem List   Diagnosis    Schatzki's ring    Calcium oxalate kidney stones    Snores    CMC arthritis, thumb, degenerative    Ureteral stone    Mixed hyperlipidemia    Coronary artery disease involving native coronary artery of native heart without angina pectoris    S/P CABG x 3           Current Outpatient Medications on File Prior to Visit   Medication Sig Dispense Refill    ketorolac (TORADOL) 10 mg tablet Take 1 tablet (10 mg total) by mouth every 6 (six) hours. 15 tablet 0    metoprolol succinate (TOPROL-XL) 50 MG 24 hr tablet Take 1 tablet (50 mg total) by mouth once daily. 90 tablet 3    aspirin (ECOTRIN) 81 MG EC tablet Take 1 tablet (81 mg total) by mouth once daily.  0    HYDROcodone-acetaminophen (NORCO) 5-325 mg per tablet 1-2 tabs po q 6-8 h prn pain 30 tablet 0    rosuvastatin (CRESTOR) 40 MG Tab Take 1 tablet (40 mg total) by mouth once daily. 90 tablet 3    sennosides/docusate sodium (STOOL SOFTENER-LAXATIVE ORAL) Take by mouth.       No current facility-administered medications on file prior to visit.                Review of patient's allergies indicates:   Allergen Reactions    Clarithromycin Other (See Comments)     hiccups    Levaquin [levofloxacin] Hives    Naldecon-ex Hives         Social History     Socioeconomic History    Marital status:      Spouse name: Not on file    Number of children: 2    Years of education: Not on file    Highest education level: Not on file   Occupational History    Occupation: pharmacist   Social Needs  "   Financial resource strain: Not hard at all    Food insecurity     Worry: Never true     Inability: Never true    Transportation needs     Medical: No     Non-medical: No   Tobacco Use    Smoking status: Never Smoker    Smokeless tobacco: Never Used   Substance and Sexual Activity    Alcohol use: No     Frequency: Never     Binge frequency: Never    Drug use: No    Sexual activity: Yes     Partners: Female   Lifestyle    Physical activity     Days per week: 5 days     Minutes per session: 30 min    Stress: To some extent   Relationships    Social connections     Talks on phone: More than three times a week     Gets together: Once a week     Attends Confucianism service: Not on file     Active member of club or organization: Yes     Attends meetings of clubs or organizations: More than 4 times per year     Relationship status:    Other Topics Concern    Not on file   Social History Narrative    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  700 lbs potatoes, a lot of which he donates to "[employer].          Treadmill and elliptical 5-6 days a week.           ROS:  General ROS: negative for  chills, fatigue or fever  Cardiovascular ROS: no chest pain or dyspnea on exertion  Musculoskeletal ROS: negative for joint pain or joint stiffness.  Negative for loss of strength.  Positive for foot pain.   Neuro ROS: Negative for syncope, numbness, or muscle weakness  Skin ROS: Negative for rash, itching or nail/hair changes.           OBJECTIVE:         Vitals:    10/08/20 0810   BP: 139/89   BP Location: Right arm   Patient Position: Sitting   BP Method: Medium (Automatic)   Pulse: 74   Weight: 83.5 kg (184 lb 1.4 oz)   Height: 5' 9" (1.753 m)        Left Lower extremity exam:  Vasc:   Palpable pedal pulses.   Feet appropriately warm to touch.   Cap refill time is within normal limits   Edema:  Minimal    Neurological:    Light touch, proprioception, and Sharp/dull sensation are all intact. "   There is no Tinel's along the tarsal tunnel.    Mulders click:   absent  Reflexes:   2+    Derm:   No open lesions, macerations, or rashes  Bruising:  absent  Redness:  absent  Pedal hair:  Present  Bilateral hallux yellow, mycotic appearing without paronychia.  There is subungual debris.      MSK:    Palpable pain plantar medial tubercle of the calcaneus left,   Palpable pain medial band of plantar fascia left,   tightness to the Achilles tendon with ROM left   There is no pain with the lateral heel squeeze/compression  test.        10/08/2020:  Xrays FINDINGS:  The left calcaneus appears intact.  There is no evidence for acute fracture or bone destruction.  There is a calcaneal spur at the insertion of the plantar fascia and a tiny calcaneal spur is noted at the insertion of the Achilles tendon.  Mild soft tissue swelling is present.  No radiopaque soft tissue foreign bodies identified.          ASSESSMENT/PLAN:           Problem List Items Addressed This Visit     None      Visit Diagnoses     Heel pain, chronic, left    -  Primary    Relevant Orders    X-Ray Calcaneus 2 View Left (Completed)    ORTHOTIC DEVICE (DME)    MRI Foot ([facility]) Left Without Contrast    Onychomycosis due to dermatophyte        Relevant Medications    ciclopirox (PENLAC) 8 % Soln    Plantar fasciitis        Relevant Orders    MRI Foot ([facility]) Left Without Contrast            I counseled the patient on the patient's conditions, their implications and medical management.     X-rays reviewed with the patient in the exam room.    We discussed the etiology of the problem and the patient was given literature regarding plantar fasciitis and stretching.      Patient defers cortisone injection.      We also discussed proper shoe gear.   Discussed icing the affected area as needed and also wearing appropriate shoe gear and avoiding flats, slippers, sandals, and going barefoot.     prescription for custom foot orthotics.     MRI.  Will call  with results.     bilateral hallux toenails yellow and mycotic appearing.  OTC topical does not help.  Will try Penlac.  Defers oral antifungal for now until consultation with PCP (he is on cholesterol medications)    We discussed the possibility of an injection or physical therapy should this not resolve the problem.   Call or return to clinic prn if these symptoms worsen or fail to improve as anticipated in 4-6 weeks.

## 2020-10-12 ENCOUNTER — TELEPHONE (OUTPATIENT)
Dept: CARDIOTHORACIC SURGERY | Facility: CLINIC | Age: 50
End: 2020-10-12

## 2020-10-12 NOTE — TELEPHONE ENCOUNTER
Returned call; no answer.  Left VM asking the representative to call me back.    ----- Message from Eve Holly sent at 10/9/2020  4:46 PM CDT -----  Regarding: Patient advice  Contact: moreno from [employer]  Moreno from [employer] called in regards to a cardiac from for insurance       Call back [phone] ext [phone]

## 2020-10-16 ENCOUNTER — TELEPHONE (OUTPATIENT)
Dept: PODIATRY | Facility: CLINIC | Age: 50
End: 2020-10-16

## 2020-10-16 ENCOUNTER — TELEPHONE (OUTPATIENT)
Dept: CARDIOTHORACIC SURGERY | Facility: CLINIC | Age: 50
End: 2020-10-16

## 2020-10-16 NOTE — TELEPHONE ENCOUNTER
Discuss MRI results with patient over the phone.  He has plantar fasciitis.  He is unable to get custom orthotics at this time.  He is trying some over-the-counter insoles from [facility].  He does have information on over-the-counter Spenco insoles.  He is also stretching.  He does reports some relief to his feet.    He also mention that he needs a prior authorization from me [facility].

## 2020-10-20 ENCOUNTER — PATIENT MESSAGE (OUTPATIENT)
Dept: INTERNAL MEDICINE | Facility: CLINIC | Age: 50
End: 2020-10-20

## 2020-10-21 ENCOUNTER — TELEPHONE (OUTPATIENT)
Dept: CARDIOTHORACIC SURGERY | Facility: CLINIC | Age: 50
End: 2020-10-21

## 2020-10-21 NOTE — TELEPHONE ENCOUNTER
Returned call to [employer]; no answer.  Left VM instructing the representative to contact our medical records dept, as their request for medical records was forwarded to [facility]'s medical records dept.  Phone number to medical records was left on [employer]' voicemail.    ----- Message from Tanya Shen sent at 10/20/2020  4:25 PM CDT -----  Regarding: paper work  Contact: pt  [employer] called in regards to paper work that was faxed over on 10/12/2020 have you completed the form       Ashley [phone] ext [phone]

## 2020-11-03 ENCOUNTER — TELEPHONE (OUTPATIENT)
Dept: CARDIOTHORACIC SURGERY | Facility: CLINIC | Age: 50
End: 2020-11-03

## 2020-11-03 NOTE — TELEPHONE ENCOUNTER
Returned call to Moreno at [facility] [employer], who is requesting records on pt.  No answer; left VM informing representative that he needs to call [facility] Medical Records / Release of Information department at [phone] to speak with an HIM representative, as I am unable to release medical records.    ----- Message from Lam Mcclendon sent at 11/3/2020  9:54 AM CST -----  Regarding: call back about the cardiac form        The caller(Moreno with [facility] [employer]) is trying to get in touch with you about a cardiac form that they need you to fill out and send back to them for his life insurance.    Please contact the caller to confirm you received it as they need this sent back today is the last day.     Caller's Fax [phone] ( Attn To Case # [id number])    Phone # [phone]( ext is the case # [id number])

## 2020-11-04 ENCOUNTER — TELEPHONE (OUTPATIENT)
Dept: CARDIOTHORACIC SURGERY | Facility: CLINIC | Age: 50
End: 2020-11-04

## 2020-11-04 NOTE — TELEPHONE ENCOUNTER
Returned call to Moreno with [employer]. who is requesting records on pt.  Informed Moreno that he needs to call [facility]'s Medical Records / Release of Information department at [phone] to speak with an HIM representative, as I am unable to release medical records.  Moreno verbalized understanding and was transferred to [facility] Medical Records dept.    ----- Message from Loretta Au sent at 11/4/2020  8:11 AM CST -----  [employer] is calling on behalf of the pt and is calling for the pt cardiac form and would like for the nurse to give them a call back at [phone] ext [phone] fax [phone]

## 2020-11-10 ENCOUNTER — PATIENT MESSAGE (OUTPATIENT)
Dept: PODIATRY | Facility: CLINIC | Age: 50
End: 2020-11-10

## 2020-11-12 ENCOUNTER — TELEPHONE (OUTPATIENT)
Dept: CARDIOTHORACIC SURGERY | Facility: CLINIC | Age: 50
End: 2020-11-12

## 2020-11-12 NOTE — TELEPHONE ENCOUNTER
Called and spoke with Layla in Medical Records in regards to a medical records request from [employer] for pt.  Layla informed me that [employer] request is not for medical records and that the paperwork scanned into media has to be completed and signed by Dr. Mejia. Requested paperwork completed and awaiting Dr. Mejia's signature.  As soon as it is signed, I will fax it to [employer].    ----- Message from Alicia Patel sent at 11/12/2020  3:37 PM CST -----  Regarding: Cardiac Life Inurance Form  Moreno With [employer]  Ph [phone] Ext [phone]  Fax [phone]       Calling to see if Cardiac Life Insurance Form has been received.  States that Form was faxed  on 11/5/20.  Form is needing Doctor Signature.  Contact Moreno with [employer] at Number listed above with any questions.

## 2020-11-13 ENCOUNTER — TELEPHONE (OUTPATIENT)
Dept: CARDIOTHORACIC SURGERY | Facility: CLINIC | Age: 50
End: 2020-11-13

## 2020-11-13 NOTE — TELEPHONE ENCOUNTER
Called Moreno with [employer], after receiving the message below and informed him that requested paperwork, signed by Dr. Mejia, was faxed to [phone] today.  Moreno verbalized understanding.    Regarding: Cardiac Life Inurance Form  Jorey With [employer]  Ph [phone] Ext [phone]  Fax [phone]         Calling to see if Cardiac Life Insurance Form has been received.  States that Form was faxed  on 11/5/20.  Form is needing Doctor Signature.  Contact Moreno Guthrie at Number listed above with any questions.

## 2020-12-10 ENCOUNTER — OFFICE VISIT (OUTPATIENT)
Dept: CARDIOLOGY | Facility: CLINIC | Age: 50
End: 2020-12-10
Payer: COMMERCIAL

## 2020-12-10 VITALS
SYSTOLIC BLOOD PRESSURE: 118 MMHG | HEART RATE: 76 BPM | BODY MASS INDEX: 27.49 KG/M2 | DIASTOLIC BLOOD PRESSURE: 70 MMHG | WEIGHT: 185.63 LBS | HEIGHT: 69 IN | OXYGEN SATURATION: 99 %

## 2020-12-10 DIAGNOSIS — I25.810 CORONARY ARTERY DISEASE INVOLVING CORONARY BYPASS GRAFT OF NATIVE HEART WITHOUT ANGINA PECTORIS: Primary | ICD-10-CM

## 2020-12-10 DIAGNOSIS — E78.2 MIXED HYPERLIPIDEMIA: ICD-10-CM

## 2020-12-10 DIAGNOSIS — Z95.1 S/P CABG X 3: ICD-10-CM

## 2020-12-10 PROCEDURE — 99214 PR OFFICE/OUTPT VISIT, EST, LEVL IV, 30-39 MIN: ICD-10-PCS | Mod: S$GLB,,, | Performed by: INTERNAL MEDICINE

## 2020-12-10 PROCEDURE — 99999 PR PBB SHADOW E&M-EST. PATIENT-LVL III: CPT | Mod: PBBFAC,,, | Performed by: INTERNAL MEDICINE

## 2020-12-10 PROCEDURE — 99214 OFFICE O/P EST MOD 30 MIN: CPT | Mod: S$GLB,,, | Performed by: INTERNAL MEDICINE

## 2020-12-10 PROCEDURE — 99999 PR PBB SHADOW E&M-EST. PATIENT-LVL III: ICD-10-PCS | Mod: PBBFAC,,, | Performed by: INTERNAL MEDICINE

## 2020-12-10 RX ORDER — METOPROLOL SUCCINATE 50 MG/1
50 TABLET, EXTENDED RELEASE ORAL DAILY
Qty: 90 TABLET | Refills: 3 | Status: SHIPPED | OUTPATIENT
Start: 2020-12-10 | End: 2022-01-21

## 2020-12-10 RX ORDER — ROSUVASTATIN CALCIUM 40 MG/1
40 TABLET, COATED ORAL DAILY
Qty: 90 TABLET | Refills: 3 | Status: SHIPPED | OUTPATIENT
Start: 2020-12-10 | End: 2022-01-21

## 2020-12-10 NOTE — PROGRESS NOTES
Subjective:    Patient ID:  Fausto Patterson is a 50 y.o. male who presents for follow-up of Coronary Artery Disease      HPI     The patient is a 50 year old male  wiht CAD post CABG X3 10/6/17 . His exercise is limited due to plantar fasciitis but is improving and has returned to walking elipicals etc .He reports left parasternal pain while lying down and turning tot he left. He denies exertion chest pain but mild YANEZ at times  Lab Results   Component Value Date     12/20/2019    K 4.3 12/20/2019     12/20/2019    CO2 27 12/20/2019    BUN 14 12/20/2019    CREATININE 1.3 12/20/2019    GLU 95 12/20/2019    HGBA1C 5.3 10/06/2017    MG 2.3 10/11/2017    AST 20 12/20/2019    ALT 26 12/20/2019    ALBUMIN 4.5 12/20/2019    PROT 7.8 12/20/2019    BILITOT 1.3 (H) 12/20/2019    WBC 6.08 12/20/2019    HGB 17.0 12/20/2019    HCT 51.3 12/20/2019    HCT 34 (L) 10/06/2017    MCV 89 12/20/2019     12/20/2019    INR 1.1 10/07/2017    TSH 1.68 09/15/2009         Lab Results   Component Value Date    CHOL 142 12/20/2019    HDL 51 12/20/2019    TRIG 118 12/20/2019       Lab Results   Component Value Date    LDLCALC 67.4 12/20/2019       Past Medical History:   Diagnosis Date    CAD (coronary artery disease)     Hyperlipidemia     Kidney stone     Schatzki's ring     Unspecified disorder of kidney and ureter     calcium oxalate nephrolithiasis       Current Outpatient Medications:     metoprolol succinate (TOPROL-XL) 50 MG 24 hr tablet, Take 1 tablet (50 mg total) by mouth once daily., Disp: 90 tablet, Rfl: 3    aspirin (ECOTRIN) 81 MG EC tablet, Take 1 tablet (81 mg total) by mouth once daily., Disp: , Rfl: 0    ciclopirox (PENLAC) 8 % Soln, Apply topically nightly. To affected toenails.  Remove with rubbing alcohol after each week of use., Disp: 6.6 mL, Rfl: 6    HYDROcodone-acetaminophen (NORCO) 5-325 mg per tablet, 1-2 tabs po q 6-8 h prn pain, Disp: 30 tablet, Rfl: 0    ketorolac (TORADOL) 10 mg tablet,  "Take 1 tablet (10 mg total) by mouth every 6 (six) hours., Disp: 15 tablet, Rfl: 0    rosuvastatin (CRESTOR) 40 MG Tab, Take 1 tablet (40 mg total) by mouth once daily., Disp: 90 tablet, Rfl: 3    sennosides/docusate sodium (STOOL SOFTENER-LAXATIVE ORAL), Take by mouth., Disp: , Rfl:           Review of Systems   Constitution: Negative for decreased appetite, diaphoresis, fever, malaise/fatigue, weight gain and weight loss.   HENT: Negative for congestion, ear discharge, ear pain and nosebleeds.    Eyes: Negative for blurred vision, double vision and visual disturbance.   Cardiovascular: Positive for chest pain (not cardiac). Negative for claudication, cyanosis, dyspnea on exertion, irregular heartbeat, leg swelling, near-syncope, orthopnea, palpitations, paroxysmal nocturnal dyspnea and syncope.   Respiratory: Positive for snoring. Negative for cough, hemoptysis, shortness of breath, sleep disturbances due to breathing, sputum production and wheezing.    Endocrine: Negative for polydipsia, polyphagia and polyuria.   Hematologic/Lymphatic: Negative for adenopathy and bleeding problem. Does not bruise/bleed easily.   Skin: Negative for color change, nail changes, poor wound healing and rash.   Musculoskeletal: Negative for muscle cramps and muscle weakness.   Gastrointestinal: Negative for abdominal pain, anorexia, change in bowel habit, hematochezia, nausea and vomiting.   Genitourinary: Negative for dysuria, frequency and hematuria.   Neurological: Negative for brief paralysis, difficulty with concentration, excessive daytime sleepiness, dizziness, focal weakness, headaches, light-headedness, seizures, vertigo and weakness.   Psychiatric/Behavioral: Negative for altered mental status and depression. The patient has insomnia.    Allergic/Immunologic: Negative for persistent infections.        Objective:/70   Pulse 76   Ht 5' 9" (1.753 m)   Wt 84.2 kg (185 lb 10 oz)   SpO2 99%   BMI 27.41 kg/m²           "   Physical Exam   Cardiovascular:   Pulses:       Femoral pulses are 2+ on the right side and 2+ on the left side.       Posterior tibial pulses are 2+ on the right side and 2+ on the left side.   Pulmonary/Chest:             Assessment:       No diagnosis found.     Plan:       There are no diagnoses linked to this encounter.

## 2020-12-11 ENCOUNTER — LAB VISIT (OUTPATIENT)
Dept: LAB | Facility: HOSPITAL | Age: 50
End: 2020-12-11
Attending: INTERNAL MEDICINE
Payer: COMMERCIAL

## 2020-12-11 DIAGNOSIS — E78.2 MIXED HYPERLIPIDEMIA: ICD-10-CM

## 2020-12-11 DIAGNOSIS — I25.810 CORONARY ARTERY DISEASE INVOLVING CORONARY BYPASS GRAFT OF NATIVE HEART WITHOUT ANGINA PECTORIS: ICD-10-CM

## 2020-12-11 LAB
ALBUMIN SERPL BCP-MCNC: 4.5 G/DL (ref 3.5–5.2)
ALP SERPL-CCNC: 56 U/L (ref 55–135)
ALT SERPL W/O P-5'-P-CCNC: 23 U/L (ref 10–44)
ANION GAP SERPL CALC-SCNC: 11 MMOL/L (ref 8–16)
AST SERPL-CCNC: 19 U/L (ref 10–40)
BASOPHILS # BLD AUTO: 0.03 K/UL (ref 0–0.2)
BASOPHILS NFR BLD: 0.5 % (ref 0–1.9)
BILIRUB SERPL-MCNC: 1.1 MG/DL (ref 0.1–1)
BUN SERPL-MCNC: 15 MG/DL (ref 6–20)
CALCIUM SERPL-MCNC: 9.3 MG/DL (ref 8.7–10.5)
CHLORIDE SERPL-SCNC: 103 MMOL/L (ref 95–110)
CHOLEST SERPL-MCNC: 134 MG/DL (ref 120–199)
CHOLEST/HDLC SERPL: 3.2 {RATIO} (ref 2–5)
CO2 SERPL-SCNC: 25 MMOL/L (ref 23–29)
CREAT SERPL-MCNC: 1.3 MG/DL (ref 0.5–1.4)
DIFFERENTIAL METHOD: NORMAL
EOSINOPHIL # BLD AUTO: 0.4 K/UL (ref 0–0.5)
EOSINOPHIL NFR BLD: 7.3 % (ref 0–8)
ERYTHROCYTE [DISTWIDTH] IN BLOOD BY AUTOMATED COUNT: 12.8 % (ref 11.5–14.5)
EST. GFR  (AFRICAN AMERICAN): >60 ML/MIN/1.73 M^2
EST. GFR  (NON AFRICAN AMERICAN): >60 ML/MIN/1.73 M^2
GLUCOSE SERPL-MCNC: 109 MG/DL (ref 70–110)
HCT VFR BLD AUTO: 50.8 % (ref 40–54)
HDLC SERPL-MCNC: 42 MG/DL (ref 40–75)
HDLC SERPL: 31.3 % (ref 20–50)
HGB BLD-MCNC: 16.5 G/DL (ref 14–18)
IMM GRANULOCYTES # BLD AUTO: 0.01 K/UL (ref 0–0.04)
IMM GRANULOCYTES NFR BLD AUTO: 0.2 % (ref 0–0.5)
LDLC SERPL CALC-MCNC: 71.2 MG/DL (ref 63–159)
LYMPHOCYTES # BLD AUTO: 1.7 K/UL (ref 1–4.8)
LYMPHOCYTES NFR BLD: 30.8 % (ref 18–48)
MCH RBC QN AUTO: 29.4 PG (ref 27–31)
MCHC RBC AUTO-ENTMCNC: 32.5 G/DL (ref 32–36)
MCV RBC AUTO: 90 FL (ref 82–98)
MONOCYTES # BLD AUTO: 0.6 K/UL (ref 0.3–1)
MONOCYTES NFR BLD: 11.7 % (ref 4–15)
NEUTROPHILS # BLD AUTO: 2.7 K/UL (ref 1.8–7.7)
NEUTROPHILS NFR BLD: 49.5 % (ref 38–73)
NONHDLC SERPL-MCNC: 92 MG/DL
NRBC BLD-RTO: 0 /100 WBC
PLATELET # BLD AUTO: 231 K/UL (ref 150–350)
PMV BLD AUTO: 9.6 FL (ref 9.2–12.9)
POTASSIUM SERPL-SCNC: 4.1 MMOL/L (ref 3.5–5.1)
PROT SERPL-MCNC: 7.6 G/DL (ref 6–8.4)
RBC # BLD AUTO: 5.62 M/UL (ref 4.6–6.2)
SODIUM SERPL-SCNC: 139 MMOL/L (ref 136–145)
TRIGL SERPL-MCNC: 104 MG/DL (ref 30–150)
WBC # BLD AUTO: 5.48 K/UL (ref 3.9–12.7)

## 2020-12-11 PROCEDURE — 36415 COLL VENOUS BLD VENIPUNCTURE: CPT

## 2020-12-11 PROCEDURE — 85025 COMPLETE CBC W/AUTO DIFF WBC: CPT

## 2020-12-11 PROCEDURE — 80053 COMPREHEN METABOLIC PANEL: CPT

## 2020-12-11 PROCEDURE — 80061 LIPID PANEL: CPT

## 2021-04-05 ENCOUNTER — PATIENT MESSAGE (OUTPATIENT)
Dept: ADMINISTRATIVE | Facility: HOSPITAL | Age: 51
End: 2021-04-05

## 2021-07-06 ENCOUNTER — PATIENT MESSAGE (OUTPATIENT)
Dept: ADMINISTRATIVE | Facility: HOSPITAL | Age: 51
End: 2021-07-06

## 2021-10-04 ENCOUNTER — PATIENT MESSAGE (OUTPATIENT)
Dept: ADMINISTRATIVE | Facility: HOSPITAL | Age: 51
End: 2021-10-04

## 2022-03-16 ENCOUNTER — PATIENT MESSAGE (OUTPATIENT)
Dept: ADMINISTRATIVE | Facility: HOSPITAL | Age: 52
End: 2022-03-16
Payer: COMMERCIAL

## 2022-05-27 ENCOUNTER — TELEPHONE (OUTPATIENT)
Dept: INTERNAL MEDICINE | Facility: CLINIC | Age: 52
End: 2022-05-27
Payer: COMMERCIAL

## 2022-05-27 ENCOUNTER — PATIENT MESSAGE (OUTPATIENT)
Dept: INTERNAL MEDICINE | Facility: CLINIC | Age: 52
End: 2022-05-27
Payer: COMMERCIAL

## 2022-05-27 NOTE — TELEPHONE ENCOUNTER
----- Message from Rajan Mckeon sent at 5/27/2022  7:26 AM CDT -----  Contact: Pt wife Millicent  The pt wife, Millicent, called and said that the pt needs  to be seen today    The pt has a sore throat, headache and fatigue    The pt can be reached at [phone]

## 2022-06-17 ENCOUNTER — TELEPHONE (OUTPATIENT)
Dept: INTERNAL MEDICINE | Facility: CLINIC | Age: 52
End: 2022-06-17
Payer: COMMERCIAL

## 2022-06-17 DIAGNOSIS — Z00.00 ANNUAL PHYSICAL EXAM: Primary | ICD-10-CM

## 2022-06-17 NOTE — TELEPHONE ENCOUNTER
----- Message from Clio Branch sent at 6/13/2022 10:11 AM CDT -----  Type:  Needs Medical Advice    Who Called: PT   Would the patient rather a call back or a response via [facility]? Callback   Best Call Back Number: [phone]  Additional Information: Pt requesting callback from office to discuss setting up annual appt sooner than first available appt in epic and getting lab work ordered

## 2022-07-06 ENCOUNTER — LAB VISIT (OUTPATIENT)
Dept: LAB | Facility: HOSPITAL | Age: 52
End: 2022-07-06
Payer: COMMERCIAL

## 2022-07-06 DIAGNOSIS — Z00.00 ANNUAL PHYSICAL EXAM: ICD-10-CM

## 2022-07-06 LAB
ALBUMIN SERPL BCP-MCNC: 4.7 G/DL (ref 3.5–5.2)
ALP SERPL-CCNC: 55 U/L (ref 55–135)
ALT SERPL W/O P-5'-P-CCNC: 27 U/L (ref 10–44)
ANION GAP SERPL CALC-SCNC: 14 MMOL/L (ref 8–16)
AST SERPL-CCNC: 20 U/L (ref 10–40)
BASOPHILS # BLD AUTO: 0.06 K/UL (ref 0–0.2)
BASOPHILS NFR BLD: 0.8 % (ref 0–1.9)
BILIRUB SERPL-MCNC: 1.4 MG/DL (ref 0.1–1)
BUN SERPL-MCNC: 16 MG/DL (ref 6–20)
CALCIUM SERPL-MCNC: 10.3 MG/DL (ref 8.7–10.5)
CHLORIDE SERPL-SCNC: 105 MMOL/L (ref 95–110)
CHOLEST SERPL-MCNC: 151 MG/DL (ref 120–199)
CHOLEST/HDLC SERPL: 3.3 {RATIO} (ref 2–5)
CO2 SERPL-SCNC: 26 MMOL/L (ref 23–29)
COMPLEXED PSA SERPL-MCNC: 0.46 NG/ML (ref 0–4)
CREAT SERPL-MCNC: 1.2 MG/DL (ref 0.5–1.4)
DIFFERENTIAL METHOD: NORMAL
EOSINOPHIL # BLD AUTO: 0.4 K/UL (ref 0–0.5)
EOSINOPHIL NFR BLD: 5.7 % (ref 0–8)
ERYTHROCYTE [DISTWIDTH] IN BLOOD BY AUTOMATED COUNT: 13.2 % (ref 11.5–14.5)
EST. GFR  (AFRICAN AMERICAN): >60 ML/MIN/1.73 M^2
EST. GFR  (NON AFRICAN AMERICAN): >60 ML/MIN/1.73 M^2
GLUCOSE SERPL-MCNC: 95 MG/DL (ref 70–110)
HCT VFR BLD AUTO: 51.1 % (ref 40–54)
HDLC SERPL-MCNC: 46 MG/DL (ref 40–75)
HDLC SERPL: 30.5 % (ref 20–50)
HGB BLD-MCNC: 16.5 G/DL (ref 14–18)
IMM GRANULOCYTES # BLD AUTO: 0.02 K/UL (ref 0–0.04)
IMM GRANULOCYTES NFR BLD AUTO: 0.3 % (ref 0–0.5)
LDLC SERPL CALC-MCNC: 77 MG/DL (ref 63–159)
LYMPHOCYTES # BLD AUTO: 1.9 K/UL (ref 1–4.8)
LYMPHOCYTES NFR BLD: 24.6 % (ref 18–48)
MCH RBC QN AUTO: 29.6 PG (ref 27–31)
MCHC RBC AUTO-ENTMCNC: 32.3 G/DL (ref 32–36)
MCV RBC AUTO: 92 FL (ref 82–98)
MONOCYTES # BLD AUTO: 0.7 K/UL (ref 0.3–1)
MONOCYTES NFR BLD: 9.5 % (ref 4–15)
NEUTROPHILS # BLD AUTO: 4.5 K/UL (ref 1.8–7.7)
NEUTROPHILS NFR BLD: 59.1 % (ref 38–73)
NONHDLC SERPL-MCNC: 105 MG/DL
NRBC BLD-RTO: 0 /100 WBC
PLATELET # BLD AUTO: 231 K/UL (ref 150–450)
PMV BLD AUTO: 9.4 FL (ref 9.2–12.9)
POTASSIUM SERPL-SCNC: 4 MMOL/L (ref 3.5–5.1)
PROT SERPL-MCNC: 8 G/DL (ref 6–8.4)
RBC # BLD AUTO: 5.58 M/UL (ref 4.6–6.2)
SODIUM SERPL-SCNC: 145 MMOL/L (ref 136–145)
TRIGL SERPL-MCNC: 140 MG/DL (ref 30–150)
WBC # BLD AUTO: 7.61 K/UL (ref 3.9–12.7)

## 2022-07-06 PROCEDURE — 36415 COLL VENOUS BLD VENIPUNCTURE: CPT | Performed by: INTERNAL MEDICINE

## 2022-07-06 PROCEDURE — 85025 COMPLETE CBC W/AUTO DIFF WBC: CPT | Performed by: INTERNAL MEDICINE

## 2022-07-06 PROCEDURE — 80053 COMPREHEN METABOLIC PANEL: CPT | Performed by: INTERNAL MEDICINE

## 2022-07-06 PROCEDURE — 80061 LIPID PANEL: CPT | Performed by: INTERNAL MEDICINE

## 2022-07-06 PROCEDURE — 84153 ASSAY OF PSA TOTAL: CPT | Performed by: INTERNAL MEDICINE

## 2022-07-18 ENCOUNTER — OFFICE VISIT (OUTPATIENT)
Dept: INTERNAL MEDICINE | Facility: CLINIC | Age: 52
End: 2022-07-18
Payer: COMMERCIAL

## 2022-07-18 VITALS
HEART RATE: 95 BPM | BODY MASS INDEX: 28.24 KG/M2 | HEIGHT: 69 IN | DIASTOLIC BLOOD PRESSURE: 80 MMHG | WEIGHT: 190.69 LBS | OXYGEN SATURATION: 99 % | SYSTOLIC BLOOD PRESSURE: 122 MMHG

## 2022-07-18 DIAGNOSIS — K22.2 SCHATZKI'S RING: ICD-10-CM

## 2022-07-18 DIAGNOSIS — Z00.00 ANNUAL PHYSICAL EXAM: Primary | ICD-10-CM

## 2022-07-18 DIAGNOSIS — N62 GYNECOMASTIA: ICD-10-CM

## 2022-07-18 DIAGNOSIS — Z95.1 S/P CABG X 3: ICD-10-CM

## 2022-07-18 DIAGNOSIS — I25.10 CORONARY ARTERY DISEASE INVOLVING NATIVE CORONARY ARTERY OF NATIVE HEART WITHOUT ANGINA PECTORIS: ICD-10-CM

## 2022-07-18 PROCEDURE — 99396 PREV VISIT EST AGE 40-64: CPT | Mod: S$GLB,,, | Performed by: INTERNAL MEDICINE

## 2022-07-18 PROCEDURE — 99999 PR PBB SHADOW E&M-EST. PATIENT-LVL III: CPT | Mod: PBBFAC,,, | Performed by: INTERNAL MEDICINE

## 2022-07-18 PROCEDURE — 99999 PR PBB SHADOW E&M-EST. PATIENT-LVL III: ICD-10-PCS | Mod: PBBFAC,,, | Performed by: INTERNAL MEDICINE

## 2022-07-18 PROCEDURE — 99396 PR PREVENTIVE VISIT,EST,40-64: ICD-10-PCS | Mod: S$GLB,,, | Performed by: INTERNAL MEDICINE

## 2022-07-18 NOTE — PROGRESS NOTES
Subjective:       Patient ID: Fausto Patterson is a 52 y.o. male.    Chief Complaint: Annual Exam    HPI   Buzzing L chest at site of pocket, where he was holding phone.  Not sure if he heart or muscle fasciculations.  He has a subtle sensation now.  Feels a slight buzz or twitch.  Began Thursday after receiving a bunch of text messages.    Twice last 2 months got shakes when he has not eaten.  128/84.  Presyncopal sensation.  Got nervous thereafter.  Eats and feels better    BP has been well controlled.    S/P CABG 2017.          - walking on treadmill of and on x 30 min..  No angina.     Hyperlipidemia.      Covid May 2022.    H/o Schatzki's ring.  He is careful with fods and serving size. GERD manageable.    No recent renal colic.    BMI 28. He has gained 20 lbs/4 years.     Diet is healthy.    Turbinate surgery earlier this year.  Less snoring after.    Review of Systems   Constitutional: Negative for activity change and unexpected weight change.   HENT: Negative for postnasal drip, rhinorrhea and sinus pressure/congestion.    Respiratory: Negative for chest tightness and shortness of breath.    Cardiovascular: Negative for chest pain and leg swelling.   Gastrointestinal: Negative for abdominal pain, nausea and vomiting.   Genitourinary: Negative for difficulty urinating, dysuria, hematuria and urgency.        No ED, decr libido   Integumentary:  Negative for rash.   Neurological: Negative for headaches.   Psychiatric/Behavioral: Negative for dysphoric mood and sleep disturbance. The patient is not nervous/anxious.          Objective:      Physical Exam  Constitutional:       General: He is not in acute distress.     Appearance: He is well-developed.   HENT:      Head: Normocephalic and atraumatic.   Eyes:      General: No scleral icterus.        Left eye: No discharge.      Conjunctiva/sclera: Conjunctivae normal.   Neck:      Thyroid: No thyromegaly.      Vascular: No JVD.   Cardiovascular:      Rate and Rhythm:  Normal rate and regular rhythm.      Heart sounds: Normal heart sounds. No murmur heard.  Pulmonary:      Effort: Pulmonary effort is normal. No respiratory distress.      Breath sounds: Normal breath sounds. No wheezing or rales.      Comments: bilat gynecomastia, without masses.  No cw fasciculations.  Abdominal:      General: There is no distension.      Palpations: Abdomen is soft. There is no mass.      Tenderness: There is no abdominal tenderness. There is no guarding.   Genitourinary:     Prostate: Normal.      Rectum: Normal.   Musculoskeletal:      Cervical back: Normal range of motion.      Right lower leg: No edema.      Left lower leg: No edema.   Lymphadenopathy:      Cervical: No cervical adenopathy.   Skin:     General: Skin is dry.      Findings: No rash.   Neurological:      Mental Status: He is alert and oriented to person, place, and time.   Psychiatric:         Behavior: Behavior normal.         Thought Content: Thought content normal.         Judgment: Judgment normal.         Results for orders placed or performed in visit on 07/06/22   PSA, Screening   Result Value Ref Range    PSA, Screen 0.46 0.00 - 4.00 ng/mL   Lipid Panel   Result Value Ref Range    Cholesterol 151 120 - 199 mg/dL    Triglycerides 140 30 - 150 mg/dL    HDL 46 40 - 75 mg/dL    LDL Cholesterol 77.0 63.0 - 159.0 mg/dL    HDL/Cholesterol Ratio 30.5 20.0 - 50.0 %    Total Cholesterol/HDL Ratio 3.3 2.0 - 5.0    Non-HDL Cholesterol 105 mg/dL   Comprehensive Metabolic Panel   Result Value Ref Range    Sodium 145 136 - 145 mmol/L    Potassium 4.0 3.5 - 5.1 mmol/L    Chloride 105 95 - 110 mmol/L    CO2 26 23 - 29 mmol/L    Glucose 95 70 - 110 mg/dL    BUN 16 6 - 20 mg/dL    Creatinine 1.2 0.5 - 1.4 mg/dL    Calcium 10.3 8.7 - 10.5 mg/dL    Total Protein 8.0 6.0 - 8.4 g/dL    Albumin 4.7 3.5 - 5.2 g/dL    Total Bilirubin 1.4 (H) 0.1 - 1.0 mg/dL    Alkaline Phosphatase 55 55 - 135 U/L    AST 20 10 - 40 U/L    ALT 27 10 - 44 U/L     Anion Gap 14 8 - 16 mmol/L    eGFR if African American >60.0 >60 mL/min/1.73 m^2    eGFR if non African American >60.0 >60 mL/min/1.73 m^2   CBC Auto Differential   Result Value Ref Range    WBC 7.61 3.90 - 12.70 K/uL    RBC 5.58 4.60 - 6.20 M/uL    Hemoglobin 16.5 14.0 - 18.0 g/dL    Hematocrit 51.1 40.0 - 54.0 %    MCV 92 82 - 98 fL    MCH 29.6 27.0 - 31.0 pg    MCHC 32.3 32.0 - 36.0 g/dL    RDW 13.2 11.5 - 14.5 %    Platelets 231 150 - 450 K/uL    MPV 9.4 9.2 - 12.9 fL    Immature Granulocytes 0.3 0.0 - 0.5 %    Gran # (ANC) 4.5 1.8 - 7.7 K/uL    Immature Grans (Abs) 0.02 0.00 - 0.04 K/uL    Lymph # 1.9 1.0 - 4.8 K/uL    Mono # 0.7 0.3 - 1.0 K/uL    Eos # 0.4 0.0 - 0.5 K/uL    Baso # 0.06 0.00 - 0.20 K/uL    nRBC 0 0 /100 WBC    Gran % 59.1 38.0 - 73.0 %    Lymph % 24.6 18.0 - 48.0 %    Mono % 9.5 4.0 - 15.0 %    Eosinophil % 5.7 0.0 - 8.0 %    Basophil % 0.8 0.0 - 1.9 %    Differential Method Automated      Assessment:       Problem List Items Addressed This Visit     Schatzki's ring    S/P CABG x 3    Coronary artery disease involving native coronary artery of native heart without angina pectoris      Other Visit Diagnoses     Annual physical exam    -  Primary    Gynecomastia        BMI 28.0-28.9,adult              Plan:       Fausto was seen today for annual exam.    Diagnoses and all orders for this visit:    Annual physical exam    Schatzki's ring    S/P CABG x 3    Coronary artery disease involving native coronary artery of native heart without angina pectoris    Gynecomastia    BMI 28.0-28.9,adult           You'd like hep s ab status checked, as exposed to blood through work.    Exercise regularly.  Weight loss diet reviewed.    Covid vaccine this fall.    Check sugar when he feels hypoglycemic.

## 2023-01-13 ENCOUNTER — PATIENT MESSAGE (OUTPATIENT)
Dept: CARDIOLOGY | Facility: CLINIC | Age: 53
End: 2023-01-13
Payer: COMMERCIAL

## 2023-01-13 DIAGNOSIS — I25.10 CORONARY ARTERY DISEASE INVOLVING NATIVE CORONARY ARTERY OF NATIVE HEART WITHOUT ANGINA PECTORIS: Primary | ICD-10-CM

## 2023-01-13 DIAGNOSIS — Z95.1 S/P CABG X 3: ICD-10-CM

## 2023-01-25 ENCOUNTER — HOSPITAL ENCOUNTER (OUTPATIENT)
Dept: CARDIOLOGY | Facility: CLINIC | Age: 53
Discharge: HOME | End: 2023-01-25
Payer: COMMERCIAL

## 2023-01-25 ENCOUNTER — OFFICE VISIT (OUTPATIENT)
Dept: CARDIOLOGY | Facility: CLINIC | Age: 53
End: 2023-01-25
Payer: COMMERCIAL

## 2023-01-25 VITALS
WEIGHT: 187.38 LBS | OXYGEN SATURATION: 98 % | HEIGHT: 69 IN | HEART RATE: 82 BPM | SYSTOLIC BLOOD PRESSURE: 144 MMHG | DIASTOLIC BLOOD PRESSURE: 89 MMHG | BODY MASS INDEX: 27.75 KG/M2

## 2023-01-25 DIAGNOSIS — R00.2 PALPITATIONS: ICD-10-CM

## 2023-01-25 DIAGNOSIS — I25.10 CORONARY ARTERY DISEASE INVOLVING NATIVE CORONARY ARTERY OF NATIVE HEART WITHOUT ANGINA PECTORIS: Primary | ICD-10-CM

## 2023-01-25 DIAGNOSIS — Z95.1 S/P CABG X 3: ICD-10-CM

## 2023-01-25 DIAGNOSIS — R03.0 ELEVATED BLOOD-PRESSURE READING WITHOUT DIAGNOSIS OF HYPERTENSION: ICD-10-CM

## 2023-01-25 DIAGNOSIS — E78.2 MIXED HYPERLIPIDEMIA: ICD-10-CM

## 2023-01-25 DIAGNOSIS — I25.10 CORONARY ARTERY DISEASE INVOLVING NATIVE CORONARY ARTERY OF NATIVE HEART WITHOUT ANGINA PECTORIS: ICD-10-CM

## 2023-01-25 PROCEDURE — 93010 ELECTROCARDIOGRAM REPORT: CPT | Mod: S$GLB,,, | Performed by: INTERNAL MEDICINE

## 2023-01-25 PROCEDURE — 99999 PR PBB SHADOW E&M-EST. PATIENT-LVL IV: CPT | Mod: PBBFAC,,, | Performed by: PHYSICIAN ASSISTANT

## 2023-01-25 PROCEDURE — 99999 PR PBB SHADOW E&M-EST. PATIENT-LVL IV: ICD-10-PCS | Mod: PBBFAC,,, | Performed by: PHYSICIAN ASSISTANT

## 2023-01-25 PROCEDURE — 99214 PR OFFICE/OUTPT VISIT, EST, LEVL IV, 30-39 MIN: ICD-10-PCS | Mod: S$GLB,,, | Performed by: PHYSICIAN ASSISTANT

## 2023-01-25 PROCEDURE — 99214 OFFICE O/P EST MOD 30 MIN: CPT | Mod: S$GLB,,, | Performed by: PHYSICIAN ASSISTANT

## 2023-01-25 PROCEDURE — 93005 EKG 12-LEAD: ICD-10-PCS | Mod: S$GLB,,, | Performed by: PHYSICIAN ASSISTANT

## 2023-01-25 PROCEDURE — 93005 ELECTROCARDIOGRAM TRACING: CPT | Mod: S$GLB,,, | Performed by: PHYSICIAN ASSISTANT

## 2023-01-25 PROCEDURE — 93010 EKG 12-LEAD: ICD-10-PCS | Mod: S$GLB,,, | Performed by: INTERNAL MEDICINE

## 2023-01-25 RX ORDER — ROSUVASTATIN CALCIUM 40 MG/1
40 TABLET, COATED ORAL DAILY
Qty: 90 TABLET | Refills: 3 | Status: SHIPPED | OUTPATIENT
Start: 2023-01-25 | End: 2024-03-11 | Stop reason: SDUPTHER

## 2023-01-25 NOTE — PATIENT INSTRUCTIONS
Take your blood pressure each morning and evening after sitting quietly for at least 5 minutes.  Record the blood pressure, pulse, date and time (AM or PM) . After two weeks, send the the results all together to Lupe Rutledge PA-C.

## 2023-01-25 NOTE — PROGRESS NOTES
"    General Cardiology Clinic Note  Reason for Visit: Annual follow up   Last Clinic Visit: 12/10/2020  General Cardiologist: Dr. Sullivan     HPI:   aFusto Patterson is a 52 y.o. male who presents for annual follow up.     Problems:  CAD s/p CABG x3 10/6/2017 (LIMA-LAD, BETH-RCA, SVG-OM1)  Hyperlipidemia  GERD     HPI  Patient presents for follow up. He reports he was having an intermittent "buzzing" sensation in his chest at rest for a couple weeks. It would last seconds. He hasn't felt it for a couple months now. For the past week, he has had some nonsustained palpitations, described as more forceful beats. He has noticed them at rest and it lasts < 1 minute. He does reports more stress than usual. Otherwise has been feeling well. He exercises for 25-30 minutes on either the treadmill or elliptical, at least 4 days a week. No symptoms with this. He denies exertional chest pain, YANEZ, orthopnea, PND, edema, syncope, frequent lightheadedness. Denies symptoms of TIA. He has significantly improved his diet over the past few weeks and has lost some weight as a results. His BP at home has been 120s-130s/80s.       ROS:      Review of Systems   Constitutional: Negative for diaphoresis, malaise/fatigue, weight gain and weight loss.   HENT:  Negative for nosebleeds.    Eyes:  Negative for vision loss in left eye, vision loss in right eye and visual disturbance.   Cardiovascular:  Positive for palpitations. Negative for chest pain, claudication, dyspnea on exertion, irregular heartbeat, leg swelling, near-syncope, orthopnea, paroxysmal nocturnal dyspnea and syncope.   Respiratory:  Negative for cough, shortness of breath, sleep disturbances due to breathing, snoring and wheezing.    Hematologic/Lymphatic: Negative for bleeding problem. Does not bruise/bleed easily.   Skin:  Negative for poor wound healing and rash.   Musculoskeletal:  Negative for muscle cramps and myalgias.   Gastrointestinal:  Negative for bloating, abdominal " "pain, diarrhea, heartburn, melena, nausea and vomiting.   Genitourinary:  Negative for hematuria and nocturia.   Neurological:  Negative for brief paralysis, dizziness, headaches, light-headedness, numbness and weakness.   Psychiatric/Behavioral:  Negative for depression.    Allergic/Immunologic: Negative for hives.     PMH:     Past Medical History:   Diagnosis Date    CAD (coronary artery disease)     Hyperlipidemia     Kidney stone     Schatzki's ring     Unspecified disorder of kidney and ureter     calcium oxalate nephrolithiasis     Past Surgical History:   Procedure Laterality Date    CORONARY ARTERY BYPASS GRAFT  10/2017    x3    SINUS SURGERY  2022     Allergies:     Review of patient's allergies indicates:   Allergen Reactions    Clarithromycin Other (See Comments)     hiccups    Levaquin [levofloxacin] Hives    Naldecon-ex Hives     Medications:     Current Outpatient Medications on File Prior to Visit   Medication Sig Dispense Refill    aspirin (ECOTRIN) 81 MG EC tablet Take 1 tablet (81 mg total) by mouth once daily.  0    metoprolol succinate (TOPROL-XL) 50 MG 24 hr tablet TAKE 1 TABLET BY MOUTH EVERY DAY 90 tablet 3    rosuvastatin (CRESTOR) 40 MG Tab TAKE 1 TABLET BY MOUTH EVERY DAY 90 tablet 0     No current facility-administered medications on file prior to visit.     Social History:     Social History     Tobacco Use    Smoking status: Never    Smokeless tobacco: Never   Substance Use Topics    Alcohol use: No     Family History:     Family History   Problem Relation Age of Onset    Arthritis Mother         back    Heart disease Father         cad    Hypertension Sister     No Known Problems Daughter     No Known Problems Daughter      Physical Exam:   BP (!) 144/89   Pulse 82   Ht 5' 9" (1.753 m)   Wt 85 kg (187 lb 6.3 oz)   SpO2 98%   BMI 27.67 kg/m²        Physical Exam  Vitals and nursing note reviewed.   Constitutional:       General: He is not in acute distress.     Appearance: Normal " appearance.   HENT:      Head: Normocephalic and atraumatic.      Nose: Nose normal.   Eyes:      General: No scleral icterus.     Extraocular Movements: Extraocular movements intact.      Conjunctiva/sclera: Conjunctivae normal.   Neck:      Thyroid: No thyromegaly.      Vascular: No carotid bruit or JVD.   Cardiovascular:      Rate and Rhythm: Normal rate and regular rhythm.      Pulses: Normal pulses.           Dorsalis pedis pulses are 2+ on the right side and 2+ on the left side.        Posterior tibial pulses are 2+ on the right side and 2+ on the left side.      Heart sounds: Normal heart sounds. No murmur heard.    No friction rub. No gallop.   Pulmonary:      Effort: Pulmonary effort is normal.      Breath sounds: Normal breath sounds. No wheezing, rhonchi or rales.   Chest:      Chest wall: No tenderness.   Abdominal:      General: Bowel sounds are normal. There is no distension.      Palpations: Abdomen is soft.      Tenderness: There is no abdominal tenderness.   Musculoskeletal:      Cervical back: Neck supple.      Right lower leg: No edema.      Left lower leg: No edema.   Skin:     General: Skin is warm and dry.      Coloration: Skin is not pale.      Findings: No erythema or rash.      Nails: There is no clubbing.   Neurological:      Mental Status: He is alert and oriented to person, place, and time.   Psychiatric:         Mood and Affect: Mood and affect normal.         Behavior: Behavior normal.        Labs:     Lab Results   Component Value Date     07/06/2022    K 4.0 07/06/2022     07/06/2022    CO2 26 07/06/2022    BUN 16 07/06/2022    CREATININE 1.2 07/06/2022    ANIONGAP 14 07/06/2022     Lab Results   Component Value Date    HGBA1C 5.3 10/06/2017     No results found for: BNP, BNPTRIAGEBLO Lab Results   Component Value Date    WBC 7.61 07/06/2022    HGB 16.5 07/06/2022    HCT 51.1 07/06/2022    HCT 34 (L) 10/06/2017     07/06/2022    GRAN 4.5 07/06/2022    GRAN 59.1  07/06/2022     Lab Results   Component Value Date    CHOL 151 07/06/2022    HDL 46 07/06/2022    LDLCALC 77.0 07/06/2022    TRIG 140 07/06/2022          Imaging:   Echocardiograms:   TTE 10/2/2017    1 - Normal left ventricular systolic function (EF 60-65%).     2 - No wall motion abnormalities.     3 - Normal left ventricular diastolic function.     4 - Normal right ventricular systolic function .     5 - Mild mitral regurgitation.     6 - Mild tricuspid regurgitation.     7 - The estimated PA systolic pressure is 26 mmHg.     Stress Tests:   Exercise EKG 12/20/2019    The EKG portion of this study is negative for ischemia.    The patient reported no chest pain during the stress test.    There were no arrhythmias during stress.    The exercise capacity was above average (15 METs).    The blood pressure response to stress was normal.    Caths:   [facility] 10/2/2017         Patient has a right dominant coronary artery.        - Left Main Coronary Artery:              The distal LM has a 70% stenosis. There is BENITA 3 flow.      - Left Anterior Descending Artery:              The mid LAD has a 80% stenosis. There is BENITA 3 flow.      - Left Circumflex Artery:              The LCX has luminal irregularities. There is BENITA 3 flow.      - Right Coronary Artery:              The proximal RCA has a 50% stenosis. There is BENITA 3 flow.      - Ramus:              The proximal ramus has a 75% stenosis. There is BENITA 3 flow.      - OM2:              The proximal OM2 has a 50% stenosis. There is BENITA 3 flow.      - Radial:              The radial was not studied.     Other:  Carotid ultrasound 10/3/2017  RIGHT SIDE:   1 - 39 % right ICA stenosis.   Heterogeneous plaque in the right internal carotid artery.   Antegrade flow in the right vertebral artery.   LEFT SIDE:   1 - 39% left ICA stenosis.   Heterogeneous plaque in the left internal carotid artery.   Antegrade flow in the left vertebral artery.        EKG 12/20/2019: normal sinus  rhythm, no blocks or conduction defects, no ischemic changes    EKG 1/25/2023: NSR, anterior TWIs (seen on prior EKGs)    Assessment:     1. Coronary artery disease involving native coronary artery of native heart without angina pectoris    2. Palpitations    3. Elevated blood-pressure reading without diagnosis of hypertension    4. S/P CABG x 3    5. Mixed hyperlipidemia      Plan:     Palpitations  He reports transient episodes of palpitations. No sustained palpitations or other cardiac symptoms. Low suspicion for significant arrhythmia. He has been slightly more stressed recently. Recommend continuing to monitor. If symptoms worsen, will consider a holter monitor.     Elevated BP readings  Unusually high in clinic today. BP runs 120s-130s/80s at home. Discussed that if his BP is staying >130/80, he will need an additional BP medication.     CAD s/p CABG  Denies anginal chest pain.   Continue ASA, statin, beta blocker     Hyperlipidemia   Above goal  Continue Crestor 40 mg daily  Discussed starting Zetia but he would like to wait to see if his dietary changes make a difference first. He will have repeats lipids in 6 months. If still elevated at that time, will discuss Zetia again.       Signed:  Lupe Rutledge PA-C  Cardiology   [phone] - General  1/25/2023 10:54 AM

## 2023-08-14 ENCOUNTER — PATIENT MESSAGE (OUTPATIENT)
Dept: INTERNAL MEDICINE | Facility: CLINIC | Age: 53
End: 2023-08-14
Payer: COMMERCIAL

## 2023-08-14 DIAGNOSIS — Z00.00 ANNUAL PHYSICAL EXAM: Primary | ICD-10-CM

## 2023-08-28 ENCOUNTER — LAB VISIT (OUTPATIENT)
Dept: LAB | Facility: HOSPITAL | Age: 53
End: 2023-08-28
Attending: INTERNAL MEDICINE
Payer: COMMERCIAL

## 2023-08-28 DIAGNOSIS — Z00.00 ANNUAL PHYSICAL EXAM: ICD-10-CM

## 2023-08-28 LAB
ALBUMIN SERPL BCP-MCNC: 4.5 G/DL (ref 3.5–5.2)
ALP SERPL-CCNC: 71 U/L (ref 55–135)
ALT SERPL W/O P-5'-P-CCNC: 20 U/L (ref 10–44)
ANION GAP SERPL CALC-SCNC: 10 MMOL/L (ref 8–16)
AST SERPL-CCNC: 20 U/L (ref 10–40)
BILIRUB SERPL-MCNC: 1.2 MG/DL (ref 0.1–1)
BUN SERPL-MCNC: 14 MG/DL (ref 6–20)
CALCIUM SERPL-MCNC: 9.9 MG/DL (ref 8.7–10.5)
CHLORIDE SERPL-SCNC: 105 MMOL/L (ref 95–110)
CHOLEST SERPL-MCNC: 144 MG/DL (ref 120–199)
CHOLEST/HDLC SERPL: 3.3 {RATIO} (ref 2–5)
CO2 SERPL-SCNC: 25 MMOL/L (ref 23–29)
COMPLEXED PSA SERPL-MCNC: 0.43 NG/ML (ref 0–4)
CREAT SERPL-MCNC: 1.2 MG/DL (ref 0.5–1.4)
ERYTHROCYTE [DISTWIDTH] IN BLOOD BY AUTOMATED COUNT: 13.2 % (ref 11.5–14.5)
EST. GFR  (NO RACE VARIABLE): >60 ML/MIN/1.73 M^2
GLUCOSE SERPL-MCNC: 86 MG/DL (ref 70–110)
HBV SURFACE AB SER-ACNC: 191.14 MIU/ML
HBV SURFACE AB SER-ACNC: REACTIVE M[IU]/ML
HCT VFR BLD AUTO: 49.8 % (ref 40–54)
HCV AB SERPL QL IA: NORMAL
HDLC SERPL-MCNC: 43 MG/DL (ref 40–75)
HDLC SERPL: 29.9 % (ref 20–50)
HGB BLD-MCNC: 16.3 G/DL (ref 14–18)
HIV 1+2 AB+HIV1 P24 AG SERPL QL IA: NORMAL
LDLC SERPL CALC-MCNC: 75.4 MG/DL (ref 63–159)
MCH RBC QN AUTO: 29.6 PG (ref 27–31)
MCHC RBC AUTO-ENTMCNC: 32.7 G/DL (ref 32–36)
MCV RBC AUTO: 91 FL (ref 82–98)
NONHDLC SERPL-MCNC: 101 MG/DL
PLATELET # BLD AUTO: 229 K/UL (ref 150–450)
PMV BLD AUTO: 9.7 FL (ref 9.2–12.9)
POTASSIUM SERPL-SCNC: 4.5 MMOL/L (ref 3.5–5.1)
PROT SERPL-MCNC: 7.8 G/DL (ref 6–8.4)
RBC # BLD AUTO: 5.5 M/UL (ref 4.6–6.2)
SODIUM SERPL-SCNC: 140 MMOL/L (ref 136–145)
TESTOST SERPL-MCNC: 396 NG/DL (ref 304–1227)
TRIGL SERPL-MCNC: 128 MG/DL (ref 30–150)
WBC # BLD AUTO: 5.99 K/UL (ref 3.9–12.7)

## 2023-08-28 PROCEDURE — 86803 HEPATITIS C AB TEST: CPT | Performed by: INTERNAL MEDICINE

## 2023-08-28 PROCEDURE — 85027 COMPLETE CBC AUTOMATED: CPT | Performed by: INTERNAL MEDICINE

## 2023-08-28 PROCEDURE — 36415 COLL VENOUS BLD VENIPUNCTURE: CPT | Performed by: INTERNAL MEDICINE

## 2023-08-28 PROCEDURE — 84153 ASSAY OF PSA TOTAL: CPT | Performed by: INTERNAL MEDICINE

## 2023-08-28 PROCEDURE — 84403 ASSAY OF TOTAL TESTOSTERONE: CPT | Performed by: INTERNAL MEDICINE

## 2023-08-28 PROCEDURE — 87389 HIV-1 AG W/HIV-1&-2 AB AG IA: CPT | Performed by: INTERNAL MEDICINE

## 2023-08-28 PROCEDURE — 80061 LIPID PANEL: CPT | Performed by: INTERNAL MEDICINE

## 2023-08-28 PROCEDURE — 80053 COMPREHEN METABOLIC PANEL: CPT | Performed by: INTERNAL MEDICINE

## 2023-08-28 PROCEDURE — 86706 HEP B SURFACE ANTIBODY: CPT | Mod: 91 | Performed by: INTERNAL MEDICINE

## 2023-08-29 ENCOUNTER — OFFICE VISIT (OUTPATIENT)
Dept: INTERNAL MEDICINE | Facility: CLINIC | Age: 53
End: 2023-08-29
Payer: COMMERCIAL

## 2023-08-29 VITALS
HEIGHT: 69 IN | OXYGEN SATURATION: 97 % | SYSTOLIC BLOOD PRESSURE: 130 MMHG | BODY MASS INDEX: 27.35 KG/M2 | WEIGHT: 184.63 LBS | HEART RATE: 69 BPM | DIASTOLIC BLOOD PRESSURE: 84 MMHG

## 2023-08-29 DIAGNOSIS — Z95.1 S/P CABG X 3: ICD-10-CM

## 2023-08-29 DIAGNOSIS — E78.2 MIXED HYPERLIPIDEMIA: ICD-10-CM

## 2023-08-29 DIAGNOSIS — J32.0 CHRONIC MAXILLARY SINUSITIS: ICD-10-CM

## 2023-08-29 DIAGNOSIS — I25.810 CORONARY ARTERY DISEASE INVOLVING CORONARY BYPASS GRAFT OF NATIVE HEART WITHOUT ANGINA PECTORIS: ICD-10-CM

## 2023-08-29 DIAGNOSIS — Z00.00 ANNUAL PHYSICAL EXAM: Primary | ICD-10-CM

## 2023-08-29 DIAGNOSIS — Z12.11 COLON CANCER SCREENING: ICD-10-CM

## 2023-08-29 PROCEDURE — 99396 PR PREVENTIVE VISIT,EST,40-64: ICD-10-PCS | Mod: S$GLB,,, | Performed by: INTERNAL MEDICINE

## 2023-08-29 PROCEDURE — 99999 PR PBB SHADOW E&M-EST. PATIENT-LVL III: ICD-10-PCS | Mod: PBBFAC,,, | Performed by: INTERNAL MEDICINE

## 2023-08-29 PROCEDURE — 99396 PREV VISIT EST AGE 40-64: CPT | Mod: S$GLB,,, | Performed by: INTERNAL MEDICINE

## 2023-08-29 PROCEDURE — 99999 PR PBB SHADOW E&M-EST. PATIENT-LVL III: CPT | Mod: PBBFAC,,, | Performed by: INTERNAL MEDICINE

## 2023-08-29 RX ORDER — METOPROLOL SUCCINATE 50 MG/1
50 TABLET, EXTENDED RELEASE ORAL DAILY
Qty: 90 TABLET | Refills: 3 | Status: SHIPPED | OUTPATIENT
Start: 2023-08-29 | End: 2024-03-19 | Stop reason: SDUPTHER

## 2023-08-29 RX ORDER — AMOXICILLIN AND CLAVULANATE POTASSIUM 875; 125 MG/1; MG/1
1 TABLET, FILM COATED ORAL EVERY 12 HOURS
Qty: 20 TABLET | Refills: 0 | Status: SHIPPED | OUTPATIENT
Start: 2023-08-29 | End: 2023-09-07 | Stop reason: SDUPTHER

## 2023-08-29 NOTE — PROGRESS NOTES
Subjective     Patient ID: Fausto Patterson is a 53 y.o. male.    Chief Complaint: Follow-up    HPI  117-120 at home.    BP higher today.    Bmi 27.      Chronic sinus congestion.  Yellow drainage from R.. Face painful x 2 o - feel pressure.  Nasal congestion may cause sob..  He had chronic sinusitis before surgical procedure almost 2 years ago.  Benadryl helps.  Uses afrin 3 days on, 3 days off.  Flonase, Claritin not helpful. Xyzal may help.  Doesn't like astelin's taste.    CA, s/p CABG- no cp. Some sob when walking long distances.    Mixed hyperlipidemia.  LDL 75.  Less careful x diet last month.    Review of Systems   Constitutional:  Negative for activity change and unexpected weight change.   HENT:  Negative for postnasal drip, rhinorrhea and sinus pressure/congestion.    Respiratory:  Negative for chest tightness and shortness of breath.    Cardiovascular:  Negative for chest pain and leg swelling.   Gastrointestinal:  Negative for abdominal pain, nausea and vomiting.   Genitourinary:  Negative for difficulty urinating, dysuria, hematuria and urgency.   Integumentary:  Negative for rash.   Neurological:  Negative for headaches.   Psychiatric/Behavioral:  Negative for dysphoric mood and sleep disturbance. The patient is not nervous/anxious.           Objective     Physical Exam  Constitutional:       General: He is not in acute distress.     Appearance: He is well-developed. He is not ill-appearing, toxic-appearing or diaphoretic.   HENT:      Head: Normocephalic and atraumatic.      Comments: R maxillary tenderness, worse medially  Eyes:      General: No scleral icterus.        Left eye: No discharge.      Conjunctiva/sclera: Conjunctivae normal.   Neck:      Thyroid: No thyromegaly.      Vascular: No JVD.   Cardiovascular:      Rate and Rhythm: Normal rate and regular rhythm.      Heart sounds: Normal heart sounds. No murmur heard.  Pulmonary:      Effort: Pulmonary effort is normal. No respiratory distress.       Breath sounds: Normal breath sounds. No stridor. No wheezing, rhonchi or rales.   Abdominal:      General: There is no distension.      Palpations: Abdomen is soft. There is no mass.      Tenderness: There is no abdominal tenderness. There is no guarding.   Musculoskeletal:      Cervical back: Normal range of motion. No rigidity.      Right lower leg: No edema.      Left lower leg: No edema.   Lymphadenopathy:      Cervical: No cervical adenopathy.   Skin:     General: Skin is dry.      Findings: No rash.   Neurological:      Mental Status: He is alert and oriented to person, place, and time.   Psychiatric:         Behavior: Behavior normal.         Thought Content: Thought content normal.         Judgment: Judgment normal.            Assessment and Plan     1. Annual physical exam    2. Chronic maxillary sinusitis    3. S/P CABG x 3    4. Mixed hyperlipidemia    5. Colon cancer screening  -     Cologuard Screening (Multitarget Stool DNA); Future; Expected date: 08/29/2023    6. Coronary artery disease involving coronary bypass graft of native heart without angina pectoris  -     metoprolol succinate (TOPROL-XL) 50 MG 24 hr tablet; Take 1 tablet (50 mg total) by mouth once daily.  Dispense: 90 tablet; Refill: 3    Other orders  -     amoxicillin-clavulanate 875-125mg (AUGMENTIN) 875-125 mg per tablet; Take 1 tablet by mouth every 12 (twelve) hours. for 10 days  Dispense: 20 tablet; Refill: 0           Covid and shingles vaccines rec'd.    LDL not at goal - we discussed adding Zetia - her prefers to work on diet.    Follow up in about 1 year (around 8/29/2024) for PE.

## 2023-08-30 ENCOUNTER — PATIENT MESSAGE (OUTPATIENT)
Dept: CARDIOLOGY | Facility: CLINIC | Age: 53
End: 2023-08-30
Payer: COMMERCIAL

## 2023-08-30 DIAGNOSIS — Z95.1 S/P CABG X 3: Primary | ICD-10-CM

## 2023-08-30 RX ORDER — EZETIMIBE 10 MG/1
10 TABLET ORAL DAILY
Qty: 90 TABLET | Refills: 3 | Status: SHIPPED | OUTPATIENT
Start: 2023-08-30 | End: 2024-03-19 | Stop reason: SDUPTHER

## 2023-09-07 ENCOUNTER — PATIENT MESSAGE (OUTPATIENT)
Dept: INTERNAL MEDICINE | Facility: CLINIC | Age: 53
End: 2023-09-07
Payer: COMMERCIAL

## 2023-09-07 DIAGNOSIS — J32.9 CHRONIC SINUSITIS, UNSPECIFIED LOCATION: Primary | ICD-10-CM

## 2023-09-07 RX ORDER — AMOXICILLIN AND CLAVULANATE POTASSIUM 875; 125 MG/1; MG/1
1 TABLET, FILM COATED ORAL EVERY 12 HOURS
Qty: 20 TABLET | Refills: 0 | Status: SHIPPED | OUTPATIENT
Start: 2023-09-07 | End: 2023-09-17

## 2023-09-08 ENCOUNTER — PATIENT MESSAGE (OUTPATIENT)
Dept: OTOLARYNGOLOGY | Facility: CLINIC | Age: 53
End: 2023-09-08
Payer: COMMERCIAL

## 2023-09-11 ENCOUNTER — LAB VISIT (OUTPATIENT)
Dept: LAB | Facility: HOSPITAL | Age: 53
End: 2023-09-11
Attending: PHYSICIAN ASSISTANT
Payer: COMMERCIAL

## 2023-09-11 ENCOUNTER — OFFICE VISIT (OUTPATIENT)
Dept: OTOLARYNGOLOGY | Facility: CLINIC | Age: 53
End: 2023-09-11
Payer: COMMERCIAL

## 2023-09-11 VITALS
WEIGHT: 187.38 LBS | BODY MASS INDEX: 27.67 KG/M2 | SYSTOLIC BLOOD PRESSURE: 125 MMHG | DIASTOLIC BLOOD PRESSURE: 82 MMHG | HEART RATE: 75 BPM

## 2023-09-11 DIAGNOSIS — J30.1 SEASONAL ALLERGIC RHINITIS DUE TO POLLEN: ICD-10-CM

## 2023-09-11 DIAGNOSIS — J34.89 NASAL OBSTRUCTION WITHOUT CHOANAL ATRESIA: ICD-10-CM

## 2023-09-11 DIAGNOSIS — J34.2 DEVIATED NASAL SEPTUM: ICD-10-CM

## 2023-09-11 DIAGNOSIS — J33.9 NASAL POLYPOSIS: Primary | ICD-10-CM

## 2023-09-11 DIAGNOSIS — J33.8 OTHER POLYP OF SINUS: ICD-10-CM

## 2023-09-11 DIAGNOSIS — J32.8 OTHER CHRONIC SINUSITIS: ICD-10-CM

## 2023-09-11 LAB — IGE SERPL-ACNC: 1321 IU/ML (ref 0–100)

## 2023-09-11 PROCEDURE — 31231 NASAL/SINUS ENDOSCOPY: ICD-10-PCS | Mod: S$GLB,,, | Performed by: PHYSICIAN ASSISTANT

## 2023-09-11 PROCEDURE — 86003 ALLG SPEC IGE CRUDE XTRC EA: CPT | Performed by: PHYSICIAN ASSISTANT

## 2023-09-11 PROCEDURE — 36415 COLL VENOUS BLD VENIPUNCTURE: CPT | Performed by: PHYSICIAN ASSISTANT

## 2023-09-11 PROCEDURE — 99204 OFFICE O/P NEW MOD 45 MIN: CPT | Mod: 25,S$GLB,, | Performed by: PHYSICIAN ASSISTANT

## 2023-09-11 PROCEDURE — 99999 PR PBB SHADOW E&M-EST. PATIENT-LVL IV: CPT | Mod: PBBFAC,,, | Performed by: PHYSICIAN ASSISTANT

## 2023-09-11 PROCEDURE — 86003 ALLG SPEC IGE CRUDE XTRC EA: CPT | Mod: 59 | Performed by: PHYSICIAN ASSISTANT

## 2023-09-11 PROCEDURE — 99204 PR OFFICE/OUTPT VISIT, NEW, LEVL IV, 45-59 MIN: ICD-10-PCS | Mod: 25,S$GLB,, | Performed by: PHYSICIAN ASSISTANT

## 2023-09-11 PROCEDURE — 82785 ASSAY OF IGE: CPT | Performed by: PHYSICIAN ASSISTANT

## 2023-09-11 PROCEDURE — 31231 NASAL ENDOSCOPY DX: CPT | Mod: S$GLB,,, | Performed by: PHYSICIAN ASSISTANT

## 2023-09-11 PROCEDURE — 99999 PR PBB SHADOW E&M-EST. PATIENT-LVL IV: ICD-10-PCS | Mod: PBBFAC,,, | Performed by: PHYSICIAN ASSISTANT

## 2023-09-11 RX ORDER — PREDNISONE 20 MG/1
20 TABLET ORAL DAILY
Qty: 7 TABLET | Refills: 0 | Status: SHIPPED | OUTPATIENT
Start: 2023-09-11 | End: 2023-09-18

## 2023-09-11 RX ORDER — PREDNISONE 20 MG/1
20 TABLET ORAL DAILY
Qty: 10 TABLET | Refills: 0 | Status: SHIPPED | OUTPATIENT
Start: 2023-09-11 | End: 2023-09-11

## 2023-09-11 NOTE — PROGRESS NOTES
Subjective:     HPI: Fausto Patterson is a 53 y.o. male who was referred to me by Dr. Chelsea Callahan in consultation for sinusitis.    Patient reports developing bilateral sinus infections snoring 2 years ago following COVID.  Patient was evaluated by ENT and underwent bilateral balloon sinuplasty.  Patient reports left nasal cavity more open and no longer causing any issues.  Patient reports persistent right facial pressure and nasal obstruction with associated clear rhinorrhea, sneezing.  Patient reports developing a sinus infection 3 weeks ago including yellow discharge from his nose and right-sided jaw pain, reduced sense of smell, and reduced sense of taste.  Patient rpeorts azelastine does not help with symptoms, but flonase has helped.  Uses afrin 3 days on/off to help with nasal obstructions. Oral antihistamines no longer help. Seen by PCP at [facility] on 8/29 and prescribed Augmentin for sinusitis. Currently on day 13.      Current sinonasal medications include flonase.    He recalls previously having allergy testing, which was reportedly positive for yeast, bermuda grass, chocolate.  Patient underwent SCIT for about 20 years and stopped in '93.  Used PRN benadryl to control symptoms up until 2 years ago. .  He denies a history of asthma.  He does not recall a prior history of nasal trauma.  He has previously had sinonasal surgery consisting of bilateral sinuplasty.    He has not had a tonsillectomy.  He is not a tobacco smoker.     Past Medical/Past Surgical History  Past Medical History:   Diagnosis Date    CAD (coronary artery disease)     Hyperlipidemia     Kidney stone     Schatzki's ring     Unspecified disorder of kidney and ureter     calcium oxalate nephrolithiasis     He has a past surgical history that includes Coronary artery bypass graft (10/2017) and Sinus surgery (2022).    Family History/Social History  His family history includes Arthritis in his mother; Heart disease in his father; Hypertension  in his sister; No Known Problems in his daughter and daughter.  He reports that he has never smoked. He has never used smokeless tobacco. He reports that he does not drink alcohol and does not use drugs.    Allergies/Immunizations  He is allergic to clarithromycin, levaquin [levofloxacin], and naldecon-ex.  Immunization History   Administered Date(s) Administered    COVID-19, MRNA, LN-S, PF (Pfizer) (Purple Cap) 02/09/2021, 03/02/2021    Hepatitis A, Adult 09/09/2005    Hepatitis B, Adult 09/09/2005    PPD Test 08/04/2015    Td (ADULT) 09/09/2005    Td (Adult), Unspecified Formulation 09/09/2005    Tdap 11/14/2018        Medications   amoxicillin-clavulanate 875-125mg  ezetimibe  metoprolol succinate  rosuvastatin Tab     Review of Systems   HENT: Positive for facial swelling, runny nose, sinus infection, sinus pressure and stuffy nose.            Objective:     /82   Pulse 75   Wt 85 kg (187 lb 6.3 oz)   BMI 27.67 kg/m²        Constitutional:   He appears well-developed and well-nourished. Normal speech.      Head:  Normocephalic and atraumatic. Facial strength is normal.      Ears:    Right Ear: No drainage or swelling. Tympanic membrane is not perforated and not erythematous. No middle ear effusion.   Left Ear: No drainage or swelling. Tympanic membrane is not perforated and not erythematous.  No middle ear effusion.     Nose:  Septal deviation (L) and polyps (R nasal cavity) present. No mucosal edema or rhinorrhea.  No foreign bodies. Turbinates normal, no turbinate masses and no turbinate hypertrophy.  Right sinus exhibits no maxillary sinus tenderness and no frontal sinus tenderness. Left sinus exhibits no maxillary sinus tenderness and no frontal sinus tenderness.     Mouth/Throat  Oropharynx clear and moist without lesions or asymmetry, normal uvula midline and lips, teeth, and gums normal. No trismus or mucous membrane lesions. No oropharyngeal exudate, posterior oropharyngeal edema or posterior  "oropharyngeal erythema. Tonsils not present.      Neck:  Phonation normal. Thyroid tenderness is present. No thyroid mass and no thyromegaly present.     He has no cervical adenopathy.     Pulmonary/Chest:   Effort normal.     Psychiatric:   He has a normal mood and affect. His speech is normal and behavior is normal.       Procedure    Nasal endoscopy performed.  See procedure note.     R NV with deviated septum     R MT with polypoid tissue in MM     L ET     R NV     Polypoid tissue in R nasal cavity      Data Reviewed  I personally reviewed the chart, including any outside records, and pertinent data below:    I reviewed the following notes: PCP    WBC (K/uL)   Date Value   08/28/2023 5.99     Eosinophil % (%)   Date Value   07/06/2022 5.7     Eos # (K/uL)   Date Value   07/06/2022 0.4     Platelets (K/uL)   Date Value   08/28/2023 229     Glucose (mg/dL)   Date Value   08/28/2023 86     No results found for: "IGE"    No sinus imaging available.    Assessment & Plan:     1. Nasal polyposis  2. Other chronic sinusitis  3. Deviated nasal septum  4. Nasal obstruction without choanal atresia  -     nasal obstruction and chronic sinusitis likely secondary to nasal polyposis; nasal endoscopy confirms evidence of BL nasal polyposis  - allergic rhinitis as well as rhinitis medicamentosa likely exacerbating symptoms  - advised patient to finish Augmentin antibiotics and start prednisone for nasal polyps  - Continue flonase  - advised patient to undergo CT sinus and follow up with sinus surgeon for further management  -     predniSONE (DELTASONE) 20 MG tablet; Take 1 tablet (20 mg total) by mouth once daily. for 7 days  Dispense: 7 tablet; Refill: 0  - CT Sinuses without Contrast; Future; Expected date: 09/11/2023    5. Seasonal allergic rhinitis due to pollen   - checking inhalant immunocaps    He will follow up with me as needed  I had a discussion with the patient regarding his condition and the further workup and " management options.    All questions were answered, and the patient is in agreement with the above.     I spent a total of 53 minutes on the day of the visit.  This includes face to face time and non-face to face time preparing to see the patient (eg, review of tests), obtaining and/or reviewing separately obtained history, documenting clinical information in the electronic or other health record, independently interpreting results and communicating results to the patient/family/caregiver, or care coordinator.    Disclaimer:  This note may have been prepared utilizing voice recognition software which may result in occasional typographical errors in the text such as sound alike words.   If further clarification is needed, please contact the ENT department of [facility].

## 2023-09-11 NOTE — PROCEDURES
"Nasal/sinus endoscopy    Date/Time: 9/11/2023 2:30 PM    Time out: Immediately prior to procedure a "time out" was called to verify the correct patient, procedure, equipment, support staff and site/side marked as required.    Performed by: Ramón Rubio PA-C  Authorized by: Ramón Rubio PA-C    Consent Done?:  Yes (Verbal)  Anesthesia:     Local anesthetic:  Lidocaine 2% and Anrdea-Synephrine 1/2%    Type of Endoscope:  Flexible    Patient tolerance:  Patient tolerated the procedure well with no immediate complications  Nose:     Procedure Performed:  Nasal Endoscopy  External:      No external nasal deformity  Intranasal:      Mucosa polyps     Mucosa ulcers not present     No mucosa lesions present     Enlarged turbinates     Septum gross deformity  Nasopharynx:      No mucosa lesions     Adenoids present     Posterior choanae patent     Eustachian tube patent     Near complete blockage of R nasal cavity  R middle polyp, difficulty visualizing R MT  Thin, opaque mucous draining into L PC  L MM with polypoid tissue    "

## 2023-09-11 NOTE — PATIENT INSTRUCTIONS
Try to wean off afrin but in the meantime use afrin nightly and then two sprays of the flonase each nostril    Nasal Steroid Spray (Flonase)  You have been prescribed or instructed to take a nasal steroid spray (Flonase). Some symptoms will experience relief within a few days; however, it may take 2-3 weeks to begin to see improvement. This medication needs to be taken consistently to see results.    Use as directed and please be aware the Flonase takes 7-10 days of consistent use before becoming effective- so try to be patient :)    Helpful hints for maximizing nasal spray medication into the nose  - Use the opposite hand to spray the nostril (example: right hand for left nostril). This will help avoid spraying the medication onto the septum (the area that divides the left and right nasal cavity.)  - Tilt the bottle so that it is facing at a slight angle up or straight back, but avoid pointing the bottle straight up while spraying.   - Gently sniff (do not snort) a few seconds after spraying.

## 2023-09-14 ENCOUNTER — HOSPITAL ENCOUNTER (OUTPATIENT)
Dept: RADIOLOGY | Facility: HOSPITAL | Age: 53
Discharge: HOME | End: 2023-09-14
Attending: PHYSICIAN ASSISTANT
Payer: COMMERCIAL

## 2023-09-14 DIAGNOSIS — J30.2 PERENNIAL ALLERGIC RHINITIS WITH SEASONAL VARIATION: Primary | ICD-10-CM

## 2023-09-14 DIAGNOSIS — J33.9 NASAL POLYPOSIS: ICD-10-CM

## 2023-09-14 DIAGNOSIS — J30.89 PERENNIAL ALLERGIC RHINITIS WITH SEASONAL VARIATION: Primary | ICD-10-CM

## 2023-09-14 DIAGNOSIS — J33.8 OTHER POLYP OF SINUS: ICD-10-CM

## 2023-09-14 LAB
A ALTERNATA IGE QN: 8.69 KU/L
A FUMIGATUS IGE QN: 4.95 KU/L
BERMUDA GRASS IGE QN: 0.21 KU/L
CAT DANDER IGE QN: <0.1 KU/L
CEDAR IGE QN: <0.1 KU/L
D FARINAE IGE QN: 2.28 KU/L
D PTERONYSS IGE QN: 1.86 KU/L
DEPRECATED A ALTERNATA IGE RAST QL: ABNORMAL
DEPRECATED A FUMIGATUS IGE RAST QL: ABNORMAL
DEPRECATED BERMUDA GRASS IGE RAST QL: ABNORMAL
DEPRECATED CAT DANDER IGE RAST QL: NORMAL
DEPRECATED CEDAR IGE RAST QL: NORMAL
DEPRECATED D FARINAE IGE RAST QL: ABNORMAL
DEPRECATED D PTERONYSS IGE RAST QL: ABNORMAL
DEPRECATED DOG DANDER IGE RAST QL: NORMAL
DEPRECATED ELDER IGE RAST QL: ABNORMAL
DEPRECATED ENGL PLANTAIN IGE RAST QL: ABNORMAL
DEPRECATED PECAN/HICK TREE IGE RAST QL: ABNORMAL
DEPRECATED ROACH IGE RAST QL: ABNORMAL
DEPRECATED TIMOTHY IGE RAST QL: ABNORMAL
DEPRECATED WEST RAGWEED IGE RAST QL: ABNORMAL
DEPRECATED WHITE OAK IGE RAST QL: ABNORMAL
DOG DANDER IGE QN: <0.1 KU/L
ELDER IGE QN: 0.17 KU/L
ENGL PLANTAIN IGE QN: 0.51 KU/L
PECAN/HICK TREE IGE QN: 0.38 KU/L
ROACH IGE QN: 0.34 KU/L
TIMOTHY IGE QN: 0.29 KU/L
WEST RAGWEED IGE QN: 0.43 KU/L
WHITE OAK IGE QN: 0.47 KU/L

## 2023-09-14 PROCEDURE — 70486 CT MAXILLOFACIAL W/O DYE: CPT | Mod: TC

## 2023-09-14 PROCEDURE — 70486 CT MAXILLOFACIAL W/O DYE: CPT | Mod: 26,,, | Performed by: RADIOLOGY

## 2023-09-14 PROCEDURE — 70486 CT SINUSES WITHOUT CONTRAST: ICD-10-PCS | Mod: 26,,, | Performed by: RADIOLOGY

## 2023-09-21 ENCOUNTER — OFFICE VISIT (OUTPATIENT)
Dept: ALLERGY | Facility: CLINIC | Age: 53
End: 2023-09-21
Payer: COMMERCIAL

## 2023-09-21 ENCOUNTER — LAB VISIT (OUTPATIENT)
Dept: LAB | Facility: HOSPITAL | Age: 53
End: 2023-09-21
Payer: COMMERCIAL

## 2023-09-21 VITALS — BODY MASS INDEX: 27.82 KG/M2 | WEIGHT: 187.81 LBS | HEIGHT: 69 IN

## 2023-09-21 DIAGNOSIS — H10.423 SIMPLE CHRONIC CONJUNCTIVITIS OF BOTH EYES: ICD-10-CM

## 2023-09-21 DIAGNOSIS — J32.9 RECURRENT SINUS INFECTIONS: ICD-10-CM

## 2023-09-21 DIAGNOSIS — J33.9 NASAL POLYPOSIS: ICD-10-CM

## 2023-09-21 DIAGNOSIS — J30.9 CHRONIC ALLERGIC RHINITIS: ICD-10-CM

## 2023-09-21 DIAGNOSIS — J32.4 CHRONIC PANSINUSITIS: Primary | ICD-10-CM

## 2023-09-21 DIAGNOSIS — J32.4 CHRONIC PANSINUSITIS: ICD-10-CM

## 2023-09-21 LAB
BASOPHILS # BLD AUTO: 0.05 K/UL (ref 0–0.2)
BASOPHILS NFR BLD: 0.7 % (ref 0–1.9)
DIFFERENTIAL METHOD: ABNORMAL
EOSINOPHIL # BLD AUTO: 1 K/UL (ref 0–0.5)
EOSINOPHIL NFR BLD: 14.6 % (ref 0–8)
ERYTHROCYTE [DISTWIDTH] IN BLOOD BY AUTOMATED COUNT: 13.6 % (ref 11.5–14.5)
HCT VFR BLD AUTO: 48.4 % (ref 40–54)
HGB BLD-MCNC: 16.5 G/DL (ref 14–18)
IGA SERPL-MCNC: 362 MG/DL (ref 40–350)
IGG SERPL-MCNC: 1197 MG/DL (ref 650–1600)
IGM SERPL-MCNC: 68 MG/DL (ref 50–300)
IMM GRANULOCYTES # BLD AUTO: 0.03 K/UL (ref 0–0.04)
IMM GRANULOCYTES NFR BLD AUTO: 0.4 % (ref 0–0.5)
LYMPHOCYTES # BLD AUTO: 1.8 K/UL (ref 1–4.8)
LYMPHOCYTES NFR BLD: 24.8 % (ref 18–48)
MCH RBC QN AUTO: 28.8 PG (ref 27–31)
MCHC RBC AUTO-ENTMCNC: 34.1 G/DL (ref 32–36)
MCV RBC AUTO: 85 FL (ref 82–98)
MONOCYTES # BLD AUTO: 0.8 K/UL (ref 0.3–1)
MONOCYTES NFR BLD: 11.3 % (ref 4–15)
NEUTROPHILS # BLD AUTO: 3.4 K/UL (ref 1.8–7.7)
NEUTROPHILS NFR BLD: 48.2 % (ref 38–73)
NRBC BLD-RTO: 0 /100 WBC
PLATELET # BLD AUTO: 257 K/UL (ref 150–450)
PMV BLD AUTO: 9.3 FL (ref 9.2–12.9)
RBC # BLD AUTO: 5.73 M/UL (ref 4.6–6.2)
WBC # BLD AUTO: 7.1 K/UL (ref 3.9–12.7)

## 2023-09-21 PROCEDURE — 86003 ALLG SPEC IGE CRUDE XTRC EA: CPT | Mod: 59 | Performed by: ALLERGY & IMMUNOLOGY

## 2023-09-21 PROCEDURE — 82787 IGG 1 2 3 OR 4 EACH: CPT | Performed by: ALLERGY & IMMUNOLOGY

## 2023-09-21 PROCEDURE — 86648 DIPHTHERIA ANTIBODY: CPT | Performed by: ALLERGY & IMMUNOLOGY

## 2023-09-21 PROCEDURE — 86355 B CELLS TOTAL COUNT: CPT | Performed by: ALLERGY & IMMUNOLOGY

## 2023-09-21 PROCEDURE — 86003 ALLG SPEC IGE CRUDE XTRC EA: CPT | Performed by: ALLERGY & IMMUNOLOGY

## 2023-09-21 PROCEDURE — 86359 T CELLS TOTAL COUNT: CPT | Performed by: ALLERGY & IMMUNOLOGY

## 2023-09-21 PROCEDURE — 82784 ASSAY IGA/IGD/IGG/IGM EACH: CPT | Performed by: ALLERGY & IMMUNOLOGY

## 2023-09-21 PROCEDURE — 85025 COMPLETE CBC W/AUTO DIFF WBC: CPT | Performed by: ALLERGY & IMMUNOLOGY

## 2023-09-21 PROCEDURE — 99999 PR PBB SHADOW E&M-EST. PATIENT-LVL III: CPT | Mod: PBBFAC,,, | Performed by: ALLERGY & IMMUNOLOGY

## 2023-09-21 PROCEDURE — 86357 NK CELLS TOTAL COUNT: CPT | Performed by: ALLERGY & IMMUNOLOGY

## 2023-09-21 PROCEDURE — 99205 OFFICE O/P NEW HI 60 MIN: CPT | Mod: S$GLB,,, | Performed by: ALLERGY & IMMUNOLOGY

## 2023-09-21 PROCEDURE — 99205 PR OFFICE/OUTPT VISIT, NEW, LEVL V, 60-74 MIN: ICD-10-PCS | Mod: S$GLB,,, | Performed by: ALLERGY & IMMUNOLOGY

## 2023-09-21 PROCEDURE — 82784 ASSAY IGA/IGD/IGG/IGM EACH: CPT | Mod: 59 | Performed by: ALLERGY & IMMUNOLOGY

## 2023-09-21 PROCEDURE — 36415 COLL VENOUS BLD VENIPUNCTURE: CPT | Mod: PO | Performed by: ALLERGY & IMMUNOLOGY

## 2023-09-21 PROCEDURE — 99999 PR PBB SHADOW E&M-EST. PATIENT-LVL III: ICD-10-PCS | Mod: PBBFAC,,, | Performed by: ALLERGY & IMMUNOLOGY

## 2023-09-21 PROCEDURE — 82785 ASSAY OF IGE: CPT | Performed by: ALLERGY & IMMUNOLOGY

## 2023-09-21 PROCEDURE — 86360 T CELL ABSOLUTE COUNT/RATIO: CPT | Performed by: ALLERGY & IMMUNOLOGY

## 2023-09-21 RX ORDER — MONTELUKAST SODIUM 10 MG/1
10 TABLET ORAL DAILY
Qty: 30 TABLET | Refills: 12 | Status: SHIPPED | OUTPATIENT
Start: 2023-09-21 | End: 2024-09-20

## 2023-09-21 NOTE — PROGRESS NOTES
Subjective:       Patient ID: Fausto Patterson is a 53 y.o. male.    Chief Complaint:  Allergies (Ent referred. Sinus infection for a while. Ent did allergy testing for inhalants which was pretty high)      52 yo man presents for consult from JAMIN Rubio for sinusitis and possible nasal polyp. He states as child 5-7 he would get hives. So he started allergy shots at age 8 and continued until age 32. Stopped about 1997. He would have eye swelling after shots and never felt they helped. No more hives. Over last several years has more sinus issues. In 2020 had balloon sinuplasty, prior to that has no polyps. After surgery had Covid and developed sinus infection and was on antibiotics. Since then has recurrent infections. Gets pressure and drainage and is mostly on right side. Takes benadryl and sudafed and tylenol when flares and in few days calms but now is chronic. Has had several antibiotics and not clearing. Has pressure in forehead, cheeks, face. He saw JAM[facility] Rubio, had CT scan which significant opacification and possible polyp but unable to get scope as so swollen. Had immunocaps with class 3 mold, class 2 dust mites, class 1 trees and weeds. He is using Flonase 2 SEN on right. Has tried Astelin and no help. Tried zyrtec, Claritin and no help. Never tried montelukast. He has no known food allergy but wonders because often after eating has thickness in throat and tight. As child was allergic to yeast, chocolate, strawberry. No asthma. No eczema. He is seeing Dr Mata next month for further eval        Environmental History: see history section for home environment  Review of Systems   HENT:  Positive for congestion, postnasal drip, rhinorrhea, sinus pressure and sinus pain. Negative for sneezing.    Eyes:  Negative for discharge and itching.   Respiratory:  Negative for cough, chest tightness, shortness of breath and wheezing.         Objective:      Physical Exam  Vitals and nursing note reviewed.    Constitutional:       General: He is not in acute distress.     Appearance: Normal appearance. He is not ill-appearing.   HENT:      Nose: No rhinorrhea.   Eyes:      General:         Right eye: No discharge.         Left eye: No discharge.      Conjunctiva/sclera: Conjunctivae normal.   Pulmonary:      Effort: Pulmonary effort is normal. No respiratory distress.   Abdominal:      General: There is no distension.   Skin:     General: Skin is warm and dry.      Findings: No erythema or rash.   Neurological:      Mental Status: He is alert and oriented to person, place, and time.   Psychiatric:         Mood and Affect: Mood normal.         Behavior: Behavior normal.         Laboratory:   none performed   Assessment:       1. Chronic pansinusitis    2. Chronic allergic rhinitis    3. Nasal polyposis    4. Recurrent sinus infections    5. Simple chronic conjunctivitis of both eyes         Plan:       Advised pt symptoms of sinusitis and recurrent infections can be related to allergic rhinitis vs structural issue with polyps vs immune def, will send further labs to eval other allergens as well as immune system. Continue fluticasone 2 SEN daily and devin dd montelukast daily. If has polyps would consider Dupixent given elevation eosinophils and symptoms but will await Dr Mata assessment  Phone review  PA Worrel notified of completed consult via Epic    I spent a total of 60 minutes on the day of the visit.  This includes face to face time and non-face to face time preparing to see the patient (eg, review of tests), obtaining and/or reviewing separately obtained history, documenting clinical information in the electronic or other health record, independently interpreting results and communicating results to the patient/family/caregiver, or care coordinator.

## 2023-09-22 LAB
CD3+CD4+ CELLS # BLD: 813 CELLS/UL (ref 300–1400)
CD3+CD4+ CELLS NFR BLD: 44.1 % (ref 28–57)
IGE SERPL-ACNC: 2043 IU/ML (ref 0–100)
LYMPHOCYTES NFR CSF MANUAL: 1211 CELLS/UL (ref 700–2100)
LYMPHOCYTES NFR CSF MANUAL: 16.9 % (ref 6–19)
LYMPHOCYTES NFR CSF MANUAL: 17.7 % (ref 10–39)
LYMPHOCYTES NFR CSF MANUAL: 17.7 % (ref 7–31)
LYMPHOCYTES NFR CSF MANUAL: 2.5 % (ref 0.9–3.6)
LYMPHOCYTES NFR CSF MANUAL: 311 CELLS/UL (ref 100–500)
LYMPHOCYTES NFR CSF MANUAL: 326 CELLS/UL (ref 200–900)
LYMPHOCYTES NFR CSF MANUAL: 326 CELLS/UL (ref 90–600)
LYMPHOCYTES NFR CSF MANUAL: 65.7 % (ref 55–83)

## 2023-09-25 LAB
ALLERGEN CHAETOMIUM GLOBOSUM IGE: 0.51 KU/L
ALLERGEN WALNUT TREE IGE: 2.51 KU/L
ALMOND IGE QN: 0.31 KU/L
BAHIA GRASS IGE QN: 0.61 KU/L
BALD CYPRESS IGE QN: <0.1 KU/L
C HERBARUM IGE QN: 1.09 KU/L
C LUNATA IGE QN: 4.95 KU/L
CHAETOMIUM GLOB. CLASS: ABNORMAL
CHOCOLATE IGE QN: <0.1 KU/L
COMMON RAGWEED IGE QN: 0.64 KU/L
COTTONWOOD IGE QN: 0.89 KU/L
COW MILK IGE QN: 0.1 KU/L
CRAB IGE QN: <0.1 KU/L
CRAWFISH IGE QN: <0.1 KU/L
DEPRECATED ALMOND IGE RAST QL: ABNORMAL
DEPRECATED BAHIA GRASS IGE RAST QL: ABNORMAL
DEPRECATED BALD CYPRESS IGE RAST QL: NORMAL
DEPRECATED C HERBARUM IGE RAST QL: ABNORMAL
DEPRECATED C LUNATA IGE RAST QL: ABNORMAL
DEPRECATED CHOCOLATE IGE RAST QL: NORMAL
DEPRECATED COMMON RAGWEED IGE RAST QL: ABNORMAL
DEPRECATED COTTONWOOD IGE RAST QL: ABNORMAL
DEPRECATED COW MILK IGE RAST QL: NORMAL
DEPRECATED CRAB IGE RAST QL: NORMAL
DEPRECATED CRAWFISH IGE RAST QL: NORMAL
DEPRECATED EGG WHITE IGE RAST QL: ABNORMAL
DEPRECATED JOHNSON GRASS IGE RAST QL: ABNORMAL
DEPRECATED LONDON PLANE IGE RAST QL: ABNORMAL
DEPRECATED MUGWORT IGE RAST QL: ABNORMAL
DEPRECATED OAT IGE RAST QL: ABNORMAL
DEPRECATED P NOTATUM IGE RAST QL: ABNORMAL
DEPRECATED PEANUT IGE RAST QL: ABNORMAL
DEPRECATED PECAN/HICK NUT IGE RAST QL: NORMAL
DEPRECATED S ROSTRATA IGE RAST QL: ABNORMAL
DEPRECATED SALTWORT IGE RAST QL: ABNORMAL
DEPRECATED SESAME SEED IGE RAST QL: ABNORMAL
DEPRECATED SHRIMP IGE RAST QL: ABNORMAL
DEPRECATED SILVER BIRCH IGE RAST QL: ABNORMAL
DEPRECATED SOYBEAN IGE RAST QL: ABNORMAL
DEPRECATED STRAWBERRY IGE RAST QL: ABNORMAL
DEPRECATED SUNFLOWER SEED IGE RAST QL: ABNORMAL
DEPRECATED WHEAT IGE RAST QL: ABNORMAL
EGG WHITE IGE QN: 0.22 KU/L
IGG1 SER-MCNC: 648 MG/DL (ref 382–929)
IGG2 SER-MCNC: 360 MG/DL (ref 242–700)
IGG3 SER-MCNC: 32 MG/DL (ref 22–176)
IGG4 SER-MCNC: 45 MG/DL (ref 4–86)
JOHNSON GRASS IGE QN: 0.24 KU/L
LONDON PLANE IGE QN: 0.45 KU/L
MUGWORT IGE QN: 0.35 KU/L
OAT IGE QN: 0.36 KU/L
P NOTATUM IGE QN: 5.09 KU/L
PEANUT IGE QN: 0.24 KU/L
PECAN/HICK NUT IGE QN: <0.1 KU/L
S ROSTRATA IGE QN: 8.52 KU/L
SALTWORT IGE QN: 0.62 KU/L
SESAME SEED IGE QN: 1.38 KU/L
SHRIMP IGE QN: 0.51 KU/L
SILVER BIRCH IGE QN: 0.55 KU/L
SOYBEAN IGE QN: 0.29 KU/L
STRAWBERRY IGE QN: 0.37 KU/L
SUNFLOWER SEED IGE QN: 0.23 KU/L
WALNUT TREE CLASS: ABNORMAL
WHEAT IGE QN: 0.31 KU/L

## 2023-09-26 LAB
ALLERGEN WHITE PINE TREE IGE: <0.1 KU/L
DEPRECATED WEST RAGWEED IGE RAST QL: ABNORMAL
DEPRECATED WILLOW IGE RAST QL: ABNORMAL
WEST RAGWEED IGE QN: 0.62 KU/L
WHITE PINE CLASS: NORMAL
WILLOW IGE QN: 0.59 KU/L

## 2023-09-29 LAB
C DIPHTHERIAE AB SER IA-ACNC: 0.77 IU/ML
C TETANI TOXOID AB SER-ACNC: 3.27 IU/ML
DEPRECATED S PNEUM23 IGG SER-MCNC: <0.3 UG/ML
DEPRECATED S PNEUM4 IGG SER-MCNC: <0.3 UG/ML
HAEM INFLU B IGG SER IA-MCNC: 1.74 MCG/ML
S PN DA SERO 19F IGG SER-MCNC: 1.1 UG/ML
S PNEUM DA 1 IGG SER-MCNC: 1.4 UG/ML
S PNEUM DA 14 IGG SER-MCNC: 3.7
S PNEUM DA 18C IGG SER-MCNC: <0.3
S PNEUM DA 19A IGG SER-MCNC: 12 UG/ML
S PNEUM DA 3 IGG SER-MCNC: <0.3 UG/ML
S PNEUM DA 5 IGG SER-MCNC: 0.4 UG/ML
S PNEUM DA 6A IGG SER-MCNC: 0.4 UG/ML
S PNEUM DA 6B IGG SER-MCNC: <0.3 UG/ML
S PNEUM DA 7F IGG SER-MCNC: <0.3 UG/ML
S PNEUM DA 9V IGG SER-MCNC: 0.5 UG/ML

## 2023-10-03 ENCOUNTER — PATIENT MESSAGE (OUTPATIENT)
Dept: ALLERGY | Facility: CLINIC | Age: 53
End: 2023-10-03
Payer: COMMERCIAL

## 2023-10-05 LAB — NONINV COLON CA DNA+OCC BLD SCRN STL QL: NEGATIVE

## 2023-10-10 ENCOUNTER — OFFICE VISIT (OUTPATIENT)
Dept: OTOLARYNGOLOGY | Facility: CLINIC | Age: 53
End: 2023-10-10
Payer: COMMERCIAL

## 2023-10-10 VITALS
HEART RATE: 69 BPM | DIASTOLIC BLOOD PRESSURE: 85 MMHG | SYSTOLIC BLOOD PRESSURE: 125 MMHG | WEIGHT: 187.81 LBS | BODY MASS INDEX: 27.73 KG/M2

## 2023-10-10 DIAGNOSIS — J34.2 DEVIATED NASAL SEPTUM: ICD-10-CM

## 2023-10-10 DIAGNOSIS — J33.9 NASAL POLYPOSIS: ICD-10-CM

## 2023-10-10 DIAGNOSIS — J30.89 PERENNIAL ALLERGIC RHINITIS WITH SEASONAL VARIATION: Primary | ICD-10-CM

## 2023-10-10 DIAGNOSIS — J30.2 PERENNIAL ALLERGIC RHINITIS WITH SEASONAL VARIATION: Primary | ICD-10-CM

## 2023-10-10 DIAGNOSIS — J32.4 CHRONIC PANSINUSITIS: ICD-10-CM

## 2023-10-10 DIAGNOSIS — J34.3 NASAL TURBINATE HYPERTROPHY: ICD-10-CM

## 2023-10-10 PROCEDURE — 99999 PR PBB SHADOW E&M-EST. PATIENT-LVL III: CPT | Mod: PBBFAC,,, | Performed by: OTOLARYNGOLOGY

## 2023-10-10 PROCEDURE — 99214 PR OFFICE/OUTPT VISIT, EST, LEVL IV, 30-39 MIN: ICD-10-PCS | Mod: 25,S$GLB,, | Performed by: OTOLARYNGOLOGY

## 2023-10-10 PROCEDURE — 99999 PR PBB SHADOW E&M-EST. PATIENT-LVL III: ICD-10-PCS | Mod: PBBFAC,,, | Performed by: OTOLARYNGOLOGY

## 2023-10-10 PROCEDURE — 99214 OFFICE O/P EST MOD 30 MIN: CPT | Mod: 25,S$GLB,, | Performed by: OTOLARYNGOLOGY

## 2023-10-10 NOTE — PROGRESS NOTES
Subjective:      Fausto Patterson is a 53 y.o. male who was self-referred for nasal polyps.    He relates a history for several years of perennial congestion and facial pressure, worse on the right side, with stabbing frontal headaches, nasal blockage and variable hyposmia.  This was treated with balloon sinuplasty by Dr. Patterson about 2 years ago which helped to improve breathing on the left but otherwise the symptoms persisted.  He more recently has been treated with 2 courses of Augmentin as well as montelukast, saline, floanse and azelastine, but his symptoms have persisted.  He takes daily aspirin and denies sensitivity.    Current sinonasal medications as above.  The last course of antibiotics was as above.  He does not regularly use nasal decongestant sprays.    He recalls previously having allergy testing in his youth that resulted in a course of immunotherapy that lasted over 20 years.    He denies a history of asthma.    He denies a history of reflux symptoms.    He denies a diagnosis of obstructive sleep apnea.     He has previously had sinonasal surgery as above.  He has had a tonsillectomy.    He does not recall a prior history of nasal trauma other than the sinonasal surgery.        %         Past Medical History  He has a past medical history of Allergy, CAD (coronary artery disease), Hyperlipidemia, Kidney stone, Schatzki's ring, Unspecified disorder of kidney and ureter, and Urticaria.    Past Surgical History  He has a past surgical history that includes Coronary artery bypass graft (10/2017); Sinus surgery (2022); and Tonsillectomy.    Family History  His family history includes Arthritis in his mother; Heart disease in his father; Hypertension in his sister; No Known Problems in his daughter and daughter.    Social History  He reports that he has never smoked. He has never been exposed to tobacco smoke. He has never used smokeless tobacco. He reports that he does not drink alcohol and does not use  drugs.    Allergies  He is allergic to clarithromycin, levaquin [levofloxacin], and naldecon-ex.    Medications   He has a current medication list which includes the following prescription(s): metoprolol succinate, montelukast, rosuvastatin, aspirin, and ezetimibe.    Review of Systems     Constitutional: Negative for appetite change, chills, fatigue, fever and unexpected weight loss.      HENT: Positive for postnasal drip and stuffy nose.  Negative for ear discharge, ear infection, ear pain, facial swelling, hearing loss, mouth sores, nosebleeds, ringing in the ears, runny nose, sinus infection, sinus pressure, sore throat, tonsil infection, dental problems, trouble swallowing and voice change.      Eyes:  Negative for change in eyesight, eye drainage, eye itching and photophobia.     Respiratory:  Negative for cough, shortness of breath, sleep apnea, snoring and wheezing.      Cardiovascular:  Negative for chest pain, foot swelling, irregular heartbeat and swollen veins.     Gastrointestinal:  Negative for abdominal pain, acid reflux, constipation, diarrhea, heartburn and vomiting.     Genitourinary: Negative for difficulty urinating, sexual problems and frequent urination.     Musc: Negative for aching joints, aching muscles, back pain and neck pain.     Skin: Negative for rash.     Allergy: Negative for food allergies and seasonal allergies.     Endocrine: Negative for cold intolerance and heat intolerance.      Neurological: Negative for dizziness, headaches, light-headedness, seizures and tremors.      Hematologic: Negative for bruises/bleeds easily and swollen glands.      Psychiatric: Negative for decreased concentration, depression, nervous/anxious and sleep disturbance.               Objective:     /85 (BP Location: Left arm, Patient Position: Sitting)   Pulse 69   Wt 85.2 kg (187 lb 13.3 oz)   BMI 27.73 kg/m²        Constitutional:   He appears well-developed. He is cooperative. Normal speech.   No hoarse voice.      Head:  Normocephalic. Salivary glands normal.  Facial strength is normal.      Ears:    Right Ear: No drainage or tenderness. Tympanic membrane is not perforated. Tympanic membrane mobility is normal. No middle ear effusion. No decreased hearing is noted.   Left Ear: No drainage or tenderness. Tympanic membrane is not perforated. Tympanic membrane mobility is normal.  No middle ear effusion. No decreased hearing is noted.     Nose:  Mucosal edema, septal deviation and polyps present. No rhinorrhea. No epistaxis. Turbinate hypertrophy.  Turbinates normal and no turbinate masses.  Right sinus exhibits no maxillary sinus tenderness and no frontal sinus tenderness. Left sinus exhibits no maxillary sinus tenderness and no frontal sinus tenderness.   Right sided grade 2 middle meatus polyps visible    Mouth/Throat  Oropharynx clear and moist without lesions or asymmetry and normal uvula midline. He does not have dentures. Normal dentition. No oral lesions or mucous membrane lesions. No oropharyngeal exudate or posterior oropharyngeal erythema. Mirror exam not performed due to patient tolerance.  Mirror exam not performed due to patient tolerance.      Neck:  Neck normal without thyromegaly masses, asymmetry, normal tracheal structure, crepitus, and tenderness, thyroid normal, trachea normal and no adenopathy. Normal range of motion present.     He has no cervical adenopathy.     Cardiovascular:    Regular rhythm.              Pulmonary/Chest:   Effort normal.     Psychiatric:   He has a normal mood and affect. His speech is normal and behavior is normal.     Neurological:   No cranial nerve deficit.     Skin:   No rash noted.       Procedure    None today.  Images reviewed from visit with JAMIN Rubio.        Data Reviewed    WBC (K/uL)   Date Value   09/21/2023 7.10     Eosinophil % (%)   Date Value   09/21/2023 14.6 (H)     Eos # (K/uL)   Date Value   09/21/2023 1.0 (H)     Platelets (K/uL)   Date  Value   09/21/2023 257     Glucose (mg/dL)   Date Value   08/28/2023 86     IgE (IU/mL)   Date Value   09/21/2023 2043 (H)     Had immunocaps with class 3 mold, class 2 dust mites, class 1 trees and weeds.     I independently reviewed the images of the CT sinuses dated 9/14/23. Pertinent findings include complete opacification of all right sinuses with central hyperdensities and partial left maxillary opacification with a left septal spur.       Assessment:     1. Perennial allergic rhinitis with seasonal variation    2. Chronic pansinusitis    3. Nasal polyposis    4. Deviated nasal septum    5. Nasal turbinate hypertrophy         Plan:     I had a long discussion with the patient regarding his condition and the further workup and management options.  He would benefit from endoscopic sinus surgery and septoplasty for the treatment of his condition.  This would include all sinuses on the right and the maxillary sinus on the left.  Inferior turbinate reduction would be included.  I discussed the risks, benefits and alternatives to surgery with the patient, as well as the expected postoperative course.  I gave him the opportunity to ask questions and I answered all of them.  I provided relevant printed information on his condition for him to review at home.  Same-day discharge is anticipated.  He may have anesthesia triage by telephone. He will also need to stop aspirin 1 week prior to surgery. The surgery will be scheduled in the near future.    Follow up for surgery.

## 2023-10-11 ENCOUNTER — TELEPHONE (OUTPATIENT)
Dept: OTOLARYNGOLOGY | Facility: CLINIC | Age: 53
End: 2023-10-11
Payer: COMMERCIAL

## 2023-10-11 NOTE — TELEPHONE ENCOUNTER
----- Message from Grazyna Del Angel sent at 10/11/2023  9:45 AM CDT -----  Regarding: Consult/Advisory  Contact: [phone]  CONSULT/ADVISORY    Name of Caller:  CATRACHO DAWKINS [[id number]]    Contact Preference:  [phone]    Nature of Call:  Pt was seen on yesterday and was told to call back to schedule surgery.  Pt is requesting 11/13/2023 as surgery date.  Please call.

## 2023-10-12 DIAGNOSIS — J32.4 CHRONIC PANSINUSITIS: Primary | ICD-10-CM

## 2023-10-12 DIAGNOSIS — J33.9 NASAL POLYPOSIS: ICD-10-CM

## 2023-10-12 DIAGNOSIS — J34.2 DEVIATED NASAL SEPTUM: ICD-10-CM

## 2023-10-12 DIAGNOSIS — J34.3 NASAL TURBINATE HYPERTROPHY: ICD-10-CM

## 2023-11-10 ENCOUNTER — ANESTHESIA EVENT (OUTPATIENT)
Dept: SURGERY | Facility: HOSPITAL | Age: 53
End: 2023-11-10
Payer: COMMERCIAL

## 2023-11-10 NOTE — ANESTHESIA PREPROCEDURE EVALUATION
11/10/2023  [facility]  Anesthesia Pre-Operative Evaluation         Patient Name: Fausto Patterson  YOB: 1970  MRN: [id number]    SUBJECTIVE:     Pre-operative evaluation for Procedure(s) (LRB):  FESS, USING COMPUTER-ASSISTED NAVIGATION (Bilateral)  SEPTOPLASTY, NOSE, WITH NASAL TURBINATE REDUCTION (Bilateral)     11/10/2023    Fausto Patterson is a 53 y.o. male w/ a significant PMHx of CAD s/p 3v CABG, HLD, schatzki ring s/p dilation, GERD, nasal polyps. Patient now presents for the above procedure(s).    TTE 2017:   1 - Normal left ventricular systolic function (EF 60-65%).     2 - No wall motion abnormalities.     3 - Normal left ventricular diastolic function.     4 - Normal right ventricular systolic function .     5 - Mild mitral regurgitation.     6 - Mild tricuspid regurgitation.     7 - The estimated PA systolic pressure is 26 mmHg.     Prev airway:   Easy mask, grade II view with gonzalez 2    Patient Active Problem List   Diagnosis    Schatzki's ring    Calcium oxalate kidney stones    Snores    CMC arthritis, thumb, degenerative    Ureteral stone    Mixed hyperlipidemia    Coronary artery disease involving native coronary artery of native heart without angina pectoris    S/P CABG x 3       Review of patient's allergies indicates:   Allergen Reactions    Clarithromycin Other (See Comments)     hiccups    Levaquin [levofloxacin] Hives    Naldecon-ex Hives       Current Inpatient Medications:      No current facility-administered medications on file prior to encounter.     Current Outpatient Medications on File Prior to Encounter   Medication Sig Dispense Refill    aspirin (ECOTRIN) 81 MG EC tablet Take 1 tablet (81 mg total) by mouth once daily.  0    ezetimibe (ZETIA) 10 mg tablet Take 1 tablet (10 mg total) by mouth once daily. (Patient not taking: Reported on  10/10/2023) 90 tablet 3    metoprolol succinate (TOPROL-XL) 50 MG 24 hr tablet Take 1 tablet (50 mg total) by mouth once daily. 90 tablet 3    montelukast (SINGULAIR) 10 mg tablet Take 1 tablet (10 mg total) by mouth once daily. 30 tablet 12    rosuvastatin (CRESTOR) 40 MG Tab Take 1 tablet (40 mg total) by mouth once daily. 90 tablet 3       Past Surgical History:   Procedure Laterality Date    CORONARY ARTERY BYPASS GRAFT  10/2017    x3    SINUS SURGERY  2022    TONSILLECTOMY         OBJECTIVE:     Vital Signs Range (Last 24H):         Significant Labs:  Lab Results   Component Value Date    WBC 7.10 09/21/2023    HGB 16.5 09/21/2023    HCT 48.4 09/21/2023     09/21/2023    CHOL 144 08/28/2023    TRIG 128 08/28/2023    HDL 43 08/28/2023    ALT 20 08/28/2023    AST 20 08/28/2023     08/28/2023    K 4.5 08/28/2023     08/28/2023    CREATININE 1.2 08/28/2023    BUN 14 08/28/2023    CO2 25 08/28/2023    TSH 1.68 09/15/2009    PSA 0.43 08/28/2023    INR 1.1 10/07/2017    GLUF 100 03/05/2018    HGBA1C 5.3 10/06/2017       Diagnostic Studies: No relevant studies.    EKG:   Results for orders placed or performed during the hospital encounter of 01/25/23   SCHEDULED EKG 12-LEAD (to Muse)    Collection Time: 01/25/23  3:01 PM    Narrative    Test Reason : I25.10,Z95.1,    Vent. Rate : 076 BPM     Atrial Rate : 076 BPM     P-R Int : 176 ms          QRS Dur : 080 ms      QT Int : 348 ms       P-R-T Axes : 056 046 063 degrees     QTc Int : 391 ms    Normal sinus rhythm  Nonspecific T wave abnormality  Abnormal ECG  When compared with ECG of 20-DEC-2019 09:36,  Nonspecific ST and/or T wave abnormalities present in the anterior leads  Confirmed by Kamila Urrutia MD ([id number]) on 1/25/2023 4:13:27 PM    Referred By: WILLAM STEPHENSON           Confirmed By:Kamila Urrutia MD       ASSESSMENT/PLAN:           Pre-op Assessment    I have reviewed the Patient Summary Reports.    I have reviewed the NPO Status.   I  have reviewed the Medications.     Review of Systems  Anesthesia Hx:  History of prior surgery of interest to airway management or planning: Previous anesthesia: MAC Denies Family Hx of Anesthesia complications.   Denies Personal Hx of Anesthesia complications.   Social:  Non-Smoker    Hematology/Oncology:  Hematology Normal   Oncology Normal     EENT/Dental:EENT/Dental Normal   Cardiovascular:   CAD (s/p CABG)   ECG has been reviewed.    Pulmonary:  Pulmonary Normal    Renal/:   renal calculi    Hepatic/GI:   GERD    Musculoskeletal:   Arthritis     Neurological:  Neurology Normal    Endocrine:  Endocrine Normal    Dermatological:  Skin Normal    Psych:  Psychiatric Normal           Physical Exam  General: Cooperative, Alert and Oriented    Airway:  Mallampati: III   Mouth Opening: Normal  TM Distance: Normal  Tongue: Normal  Neck ROM: Normal ROM    Dental:  Intact        Anesthesia Plan  Type of Anesthesia, risks & benefits discussed:    Anesthesia Type: Gen ETT  Intra-op Monitoring Plan: Standard ASA Monitors  Post Op Pain Control Plan: multimodal analgesia and IV/PO Opioids PRN  Induction:  IV  Airway Plan: Video, Post-Induction  Informed Consent: Informed consent signed with the Patient and all parties understand the risks and agree with anesthesia plan.  All questions answered.   ASA Score: 3  Day of Surgery Review of History & Physical: H&P Update referred to the surgeon/provider.    Ready For Surgery From Anesthesia Perspective.     .

## 2023-11-10 NOTE — PRE-PROCEDURE INSTRUCTIONS
The following was discussed with pt via phone and sent to pt portal. Pt verbalized understanding.      Dear Fausto ,    You are scheduled for a procedure with Dr. Keita on Monday, 11/13/2023. Your scheduled arrival time is 5:15 am.  This arrival time is roughly 2 hours before your anticipated procedure time to allow sufficient time for pre-op.  Please wear comfortable clothes.  This procedure will take place at the [facility] at the corner of [facility] and [facility].  [facility] [place] [facility] to [facility].  The address is:    [address] [place] [address].  [place] [id number]    After entering the building, you will proceed to the second floor where you can check in with registration. You should take any medications that you routinely take for blood pressure (other than those listed below), heart medications, thyroid, cholesterol, etc.     If you wear contact lenses, please wear glasses to your procedure.    Your fasting instructions are as follow:  Nothing to eat after midnight the evening before your procedure. You may drink clear liquids (water, apple juice, Gatorade) up until 2 hours prior to your arrival time. You MUST have a responsible adult to bring you home.      The evening before your procedure and the morning of, please hold the following medications:  -Aspirin and Aspirin-containing products (Goody's powder, Excedrin)  -NSAIDs (Advil, Ibuprofen, Aleve, Diclofenac)  -Vitamins/Supplements  -Herbal remedies/Teas  -Stimulants (Adderall, Vyvanse, Adipex)  -Diabetic medication (Please bring with you day of procedure)      -May take Tylenol      The evening before your procedure and the morning of, take a shower using antibacterial soap (ex: Hibiclens or Dial antibacterial soap). DO NOT apply deodorant, lotion, cologne, or anything else to the skin. Wear loose, comfortable fitting clothing. Do not wear jewelry or bring any valuables with you. If you wear dentures or  contacts, please bring your case with you or leave them at home. Use and bring any inhalers that you may have.    If you have any procedure-specific questions, please call your surgeon's office. Any other questions, don't hesitate to call at [phone].    Thanks,  JOHAN Penny  Pre-Admit Dept [facility]

## 2023-11-13 ENCOUNTER — HOSPITAL ENCOUNTER (OUTPATIENT)
Facility: HOSPITAL | Age: 53
Discharge: HOME | End: 2023-11-13
Attending: OTOLARYNGOLOGY | Admitting: OTOLARYNGOLOGY
Payer: COMMERCIAL

## 2023-11-13 ENCOUNTER — ANESTHESIA (OUTPATIENT)
Dept: SURGERY | Facility: HOSPITAL | Age: 53
End: 2023-11-13
Payer: COMMERCIAL

## 2023-11-13 VITALS
DIASTOLIC BLOOD PRESSURE: 65 MMHG | RESPIRATION RATE: 20 BRPM | HEIGHT: 70 IN | TEMPERATURE: 99 F | OXYGEN SATURATION: 96 % | SYSTOLIC BLOOD PRESSURE: 102 MMHG | WEIGHT: 180 LBS | BODY MASS INDEX: 25.77 KG/M2 | HEART RATE: 67 BPM

## 2023-11-13 DIAGNOSIS — J32.4 CHRONIC PANSINUSITIS: Primary | ICD-10-CM

## 2023-11-13 PROCEDURE — 88305 TISSUE EXAM BY PATHOLOGIST: ICD-10-PCS | Mod: 26,,, | Performed by: PATHOLOGY

## 2023-11-13 PROCEDURE — 71000016 HC POSTOP RECOV ADDL HR: Performed by: OTOLARYNGOLOGY

## 2023-11-13 PROCEDURE — 61782 PR STEREOTACTIC COMP ASSIST PROC,CRANIAL,EXTRADURAL: ICD-10-PCS | Mod: ,,, | Performed by: OTOLARYNGOLOGY

## 2023-11-13 PROCEDURE — 36000710: Performed by: OTOLARYNGOLOGY

## 2023-11-13 PROCEDURE — 31276 PR NASAL/SINUS ENDOSCOPY,EXPLOR FRONTAL SINUS: ICD-10-PCS | Mod: 51,RT,, | Performed by: OTOLARYNGOLOGY

## 2023-11-13 PROCEDURE — 63600175 PHARM REV CODE 636 W HCPCS: Performed by: REGISTERED NURSE

## 2023-11-13 PROCEDURE — 31259 PR ENDOSCOPY, NASAL/SINUS, W/ETHMOIDECTOMY/SPHENOIDOTOMY/TISS REMVL: ICD-10-PCS | Mod: RT,,, | Performed by: OTOLARYNGOLOGY

## 2023-11-13 PROCEDURE — 27201423 OPTIME MED/SURG SUP & DEVICES STERILE SUPPLY: Performed by: OTOLARYNGOLOGY

## 2023-11-13 PROCEDURE — 30140 PR EXCISION TURBINATE,SUBMUCOUS: ICD-10-PCS | Mod: 50,51,, | Performed by: OTOLARYNGOLOGY

## 2023-11-13 PROCEDURE — 71000039 HC RECOVERY, EACH ADD'L HOUR: Performed by: OTOLARYNGOLOGY

## 2023-11-13 PROCEDURE — 61782 SCAN PROC CRANIAL EXTRA: CPT | Mod: ,,, | Performed by: OTOLARYNGOLOGY

## 2023-11-13 PROCEDURE — 31276 NSL/SINS NDSC FRNT TISS RMVL: CPT | Mod: 51,RT,, | Performed by: OTOLARYNGOLOGY

## 2023-11-13 PROCEDURE — 87107 FUNGI IDENTIFICATION MOLD: CPT | Performed by: OTOLARYNGOLOGY

## 2023-11-13 PROCEDURE — 37000009 HC ANESTHESIA EA ADD 15 MINS: Performed by: OTOLARYNGOLOGY

## 2023-11-13 PROCEDURE — 30520 PR REPAIR, NASAL SEPTUM: ICD-10-PCS | Mod: 51,,, | Performed by: OTOLARYNGOLOGY

## 2023-11-13 PROCEDURE — 36000711: Performed by: OTOLARYNGOLOGY

## 2023-11-13 PROCEDURE — 30140 RESECT INFERIOR TURBINATE: CPT | Mod: 50,51,, | Performed by: OTOLARYNGOLOGY

## 2023-11-13 PROCEDURE — 94761 N-INVAS EAR/PLS OXIMETRY MLT: CPT

## 2023-11-13 PROCEDURE — D9220A PRA ANESTHESIA: Mod: ,,, | Performed by: REGISTERED NURSE

## 2023-11-13 PROCEDURE — 87075 CULTR BACTERIA EXCEPT BLOOD: CPT | Performed by: OTOLARYNGOLOGY

## 2023-11-13 PROCEDURE — 88305 TISSUE EXAM BY PATHOLOGIST: CPT | Mod: 26,,, | Performed by: PATHOLOGY

## 2023-11-13 PROCEDURE — C2625 STENT, NON-COR, TEM W/DEL SY: HCPCS | Performed by: OTOLARYNGOLOGY

## 2023-11-13 PROCEDURE — 31267 ENDOSCOPY MAXILLARY SINUS: CPT | Mod: 50,51,, | Performed by: OTOLARYNGOLOGY

## 2023-11-13 PROCEDURE — 31267 PR NASAL/SINUS ENDOSCOPY,RMV TISS MAXILL SINUS: ICD-10-PCS | Mod: 50,51,, | Performed by: OTOLARYNGOLOGY

## 2023-11-13 PROCEDURE — 37000008 HC ANESTHESIA 1ST 15 MINUTES: Performed by: OTOLARYNGOLOGY

## 2023-11-13 PROCEDURE — 71000033 HC RECOVERY, INTIAL HOUR: Performed by: OTOLARYNGOLOGY

## 2023-11-13 PROCEDURE — 31240 NSL/SNS NDSC CNCH BULL RESCJ: CPT | Mod: 51,RT,, | Performed by: OTOLARYNGOLOGY

## 2023-11-13 PROCEDURE — 71000015 HC POSTOP RECOV 1ST HR: Performed by: OTOLARYNGOLOGY

## 2023-11-13 PROCEDURE — 88305 TISSUE EXAM BY PATHOLOGIST: CPT | Mod: 59 | Performed by: PATHOLOGY

## 2023-11-13 PROCEDURE — 25000003 PHARM REV CODE 250: Performed by: OTOLARYNGOLOGY

## 2023-11-13 PROCEDURE — D9220A PRA ANESTHESIA: ICD-10-PCS | Mod: ,,, | Performed by: REGISTERED NURSE

## 2023-11-13 PROCEDURE — 87070 CULTURE OTHR SPECIMN AEROBIC: CPT | Performed by: OTOLARYNGOLOGY

## 2023-11-13 PROCEDURE — 63600175 PHARM REV CODE 636 W HCPCS: Performed by: OTOLARYNGOLOGY

## 2023-11-13 PROCEDURE — 30520 REPAIR OF NASAL SEPTUM: CPT | Mod: 51,,, | Performed by: OTOLARYNGOLOGY

## 2023-11-13 PROCEDURE — C9046 COCAINE HCL NASAL SOLUTION: HCPCS | Performed by: OTOLARYNGOLOGY

## 2023-11-13 PROCEDURE — 31240 PR NASAL/SINUS ENDOSCOPY,RMV CONCHA BULL: ICD-10-PCS | Mod: 51,RT,, | Performed by: OTOLARYNGOLOGY

## 2023-11-13 PROCEDURE — 87077 CULTURE AEROBIC IDENTIFY: CPT | Mod: 59 | Performed by: OTOLARYNGOLOGY

## 2023-11-13 PROCEDURE — 87186 SC STD MICRODIL/AGAR DIL: CPT | Mod: 59 | Performed by: OTOLARYNGOLOGY

## 2023-11-13 PROCEDURE — 87102 FUNGUS ISOLATION CULTURE: CPT | Performed by: OTOLARYNGOLOGY

## 2023-11-13 PROCEDURE — 25000003 PHARM REV CODE 250: Performed by: REGISTERED NURSE

## 2023-11-13 PROCEDURE — 31259 NSL/SINS NDSC SPHN TISS RMVL: CPT | Mod: RT,,, | Performed by: OTOLARYNGOLOGY

## 2023-11-13 PROCEDURE — 99900035 HC TECH TIME PER 15 MIN (STAT)

## 2023-11-13 DEVICE — PROPEL SINUS IMPLANT
Type: IMPLANTABLE DEVICE | Site: NOSE | Status: FUNCTIONAL
Brand: PROPEL

## 2023-11-13 RX ORDER — LIDOCAINE HYDROCHLORIDE 20 MG/ML
INJECTION INTRAVENOUS
Status: DISCONTINUED | OUTPATIENT
Start: 2023-11-13 | End: 2023-11-13

## 2023-11-13 RX ORDER — EPINEPHRINE 1 MG/ML
INJECTION, SOLUTION, CONCENTRATE INTRAVENOUS
Status: DISCONTINUED | OUTPATIENT
Start: 2023-11-13 | End: 2023-11-13 | Stop reason: HOSPADM

## 2023-11-13 RX ORDER — DEXTROMETHORPHAN HYDROBROMIDE, GUAIFENESIN 5; 100 MG/5ML; MG/5ML
650 LIQUID ORAL EVERY 8 HOURS PRN
Qty: 24 TABLET | Refills: 0 | Status: SHIPPED | OUTPATIENT
Start: 2023-11-13 | End: 2024-03-19

## 2023-11-13 RX ORDER — ROCURONIUM BROMIDE 10 MG/ML
INJECTION, SOLUTION INTRAVENOUS
Status: DISCONTINUED | OUTPATIENT
Start: 2023-11-13 | End: 2023-11-13

## 2023-11-13 RX ORDER — DEXMEDETOMIDINE HYDROCHLORIDE 100 UG/ML
INJECTION, SOLUTION INTRAVENOUS
Status: DISCONTINUED | OUTPATIENT
Start: 2023-11-13 | End: 2023-11-13

## 2023-11-13 RX ORDER — DEXAMETHASONE SODIUM PHOSPHATE 100 MG/10ML
INJECTION INTRAMUSCULAR; INTRAVENOUS
Status: DISCONTINUED | OUTPATIENT
Start: 2023-11-13 | End: 2023-11-13

## 2023-11-13 RX ORDER — COCAINE HYDROCHLORIDE 40 MG/ML
SOLUTION NASAL
Status: DISCONTINUED | OUTPATIENT
Start: 2023-11-13 | End: 2023-11-13 | Stop reason: HOSPADM

## 2023-11-13 RX ORDER — HYDROCODONE BITARTRATE AND ACETAMINOPHEN 5; 325 MG/1; MG/1
1 TABLET ORAL EVERY 4 HOURS PRN
Status: DISCONTINUED | OUTPATIENT
Start: 2023-11-13 | End: 2023-11-13 | Stop reason: HOSPADM

## 2023-11-13 RX ORDER — SUCCINYLCHOLINE CHLORIDE 20 MG/ML
INJECTION INTRAMUSCULAR; INTRAVENOUS
Status: DISCONTINUED | OUTPATIENT
Start: 2023-11-13 | End: 2023-11-13

## 2023-11-13 RX ORDER — PROPOFOL 10 MG/ML
VIAL (ML) INTRAVENOUS
Status: DISCONTINUED | OUTPATIENT
Start: 2023-11-13 | End: 2023-11-13

## 2023-11-13 RX ORDER — LIDOCAINE HYDROCHLORIDE AND EPINEPHRINE 10; 10 MG/ML; UG/ML
INJECTION, SOLUTION INFILTRATION; PERINEURAL
Status: DISCONTINUED | OUTPATIENT
Start: 2023-11-13 | End: 2023-11-13 | Stop reason: HOSPADM

## 2023-11-13 RX ORDER — SODIUM CHLORIDE 0.9 % (FLUSH) 0.9 %
10 SYRINGE (ML) INJECTION
Status: DISCONTINUED | OUTPATIENT
Start: 2023-11-13 | End: 2023-11-13 | Stop reason: HOSPADM

## 2023-11-13 RX ORDER — FENTANYL CITRATE 50 UG/ML
INJECTION, SOLUTION INTRAMUSCULAR; INTRAVENOUS
Status: DISCONTINUED | OUTPATIENT
Start: 2023-11-13 | End: 2023-11-13

## 2023-11-13 RX ORDER — IBUPROFEN 800 MG/1
800 TABLET ORAL EVERY 8 HOURS PRN
Qty: 24 TABLET | Refills: 0 | Status: SHIPPED | OUTPATIENT
Start: 2023-11-13 | End: 2024-03-19

## 2023-11-13 RX ORDER — MIDAZOLAM HYDROCHLORIDE 1 MG/ML
INJECTION INTRAMUSCULAR; INTRAVENOUS
Status: DISCONTINUED | OUTPATIENT
Start: 2023-11-13 | End: 2023-11-13

## 2023-11-13 RX ORDER — ONDANSETRON 2 MG/ML
INJECTION INTRAMUSCULAR; INTRAVENOUS
Status: DISCONTINUED | OUTPATIENT
Start: 2023-11-13 | End: 2023-11-13

## 2023-11-13 RX ORDER — PROPOFOL 10 MG/ML
VIAL (ML) INTRAVENOUS CONTINUOUS PRN
Status: DISCONTINUED | OUTPATIENT
Start: 2023-11-13 | End: 2023-11-13

## 2023-11-13 RX ORDER — SODIUM CHLORIDE 0.9 % (FLUSH) 0.9 %
10 SYRINGE (ML) INJECTION DAILY PRN
Status: DISCONTINUED | OUTPATIENT
Start: 2023-11-13 | End: 2023-11-13 | Stop reason: HOSPADM

## 2023-11-13 RX ORDER — LIDOCAINE HYDROCHLORIDE 10 MG/ML
1 INJECTION, SOLUTION EPIDURAL; INFILTRATION; INTRACAUDAL; PERINEURAL ONCE
Status: DISCONTINUED | OUTPATIENT
Start: 2023-11-13 | End: 2023-11-13 | Stop reason: HOSPADM

## 2023-11-13 RX ORDER — ONDANSETRON 4 MG/1
4 TABLET, ORALLY DISINTEGRATING ORAL EVERY 8 HOURS PRN
Qty: 5 TABLET | Refills: 0 | Status: SHIPPED | OUTPATIENT
Start: 2023-11-13 | End: 2024-03-19

## 2023-11-13 RX ORDER — HYDROMORPHONE HYDROCHLORIDE 1 MG/ML
0.2 INJECTION, SOLUTION INTRAMUSCULAR; INTRAVENOUS; SUBCUTANEOUS EVERY 5 MIN PRN
Status: DISCONTINUED | OUTPATIENT
Start: 2023-11-13 | End: 2023-11-13 | Stop reason: HOSPADM

## 2023-11-13 RX ORDER — PROCHLORPERAZINE EDISYLATE 5 MG/ML
5 INJECTION INTRAMUSCULAR; INTRAVENOUS EVERY 30 MIN PRN
Status: DISCONTINUED | OUTPATIENT
Start: 2023-11-13 | End: 2023-11-13 | Stop reason: HOSPADM

## 2023-11-13 RX ORDER — AMOXICILLIN 500 MG/1
500 CAPSULE ORAL EVERY 8 HOURS
Qty: 21 CAPSULE | Refills: 0 | Status: SHIPPED | OUTPATIENT
Start: 2023-11-13 | End: 2023-11-16

## 2023-11-13 RX ADMIN — FENTANYL CITRATE 100 MCG: 50 INJECTION, SOLUTION INTRAMUSCULAR; INTRAVENOUS at 07:11

## 2023-11-13 RX ADMIN — DEXMEDETOMIDINE HYDROCHLORIDE 4 MCG: 100 INJECTION, SOLUTION INTRAVENOUS at 10:11

## 2023-11-13 RX ADMIN — MIDAZOLAM HYDROCHLORIDE 2 MG: 1 INJECTION INTRAMUSCULAR; INTRAVENOUS at 07:11

## 2023-11-13 RX ADMIN — ONDANSETRON 4 MG: 2 INJECTION INTRAMUSCULAR; INTRAVENOUS at 07:11

## 2023-11-13 RX ADMIN — DEXMEDETOMIDINE HYDROCHLORIDE 4 MCG: 100 INJECTION, SOLUTION INTRAVENOUS at 08:11

## 2023-11-13 RX ADMIN — PROPOFOL 160 MCG/KG/MIN: 10 INJECTION, EMULSION INTRAVENOUS at 09:11

## 2023-11-13 RX ADMIN — SUCCINYLCHOLINE CHLORIDE 140 MG: 20 INJECTION, SOLUTION INTRAMUSCULAR; INTRAVENOUS at 07:11

## 2023-11-13 RX ADMIN — GLYCOPYRROLATE 0.2 MG: 0.2 INJECTION, SOLUTION INTRAMUSCULAR; INTRAVENOUS at 07:11

## 2023-11-13 RX ADMIN — DEXMEDETOMIDINE HYDROCHLORIDE 12 MCG: 100 INJECTION, SOLUTION INTRAVENOUS at 07:11

## 2023-11-13 RX ADMIN — PROPOFOL 160 MCG/KG/MIN: 10 INJECTION, EMULSION INTRAVENOUS at 08:11

## 2023-11-13 RX ADMIN — DEXAMETHASONE SODIUM PHOSPHATE 8 MG: 10 INJECTION INTRAMUSCULAR; INTRAVENOUS at 07:11

## 2023-11-13 RX ADMIN — ROCURONIUM BROMIDE 45 MG: 10 INJECTION, SOLUTION INTRAVENOUS at 07:11

## 2023-11-13 RX ADMIN — ROCURONIUM BROMIDE 5 MG: 10 INJECTION, SOLUTION INTRAVENOUS at 07:11

## 2023-11-13 RX ADMIN — DEXMEDETOMIDINE HYDROCHLORIDE 4 MCG: 100 INJECTION, SOLUTION INTRAVENOUS at 09:11

## 2023-11-13 RX ADMIN — ROCURONIUM BROMIDE 10 MG: 10 INJECTION, SOLUTION INTRAVENOUS at 08:11

## 2023-11-13 RX ADMIN — PROPOFOL 200 MCG/KG/MIN: 10 INJECTION, EMULSION INTRAVENOUS at 07:11

## 2023-11-13 RX ADMIN — PHENYLEPHRINE HYDROCHLORIDE 20 MCG/MIN: 10 INJECTION INTRAVENOUS at 07:11

## 2023-11-13 RX ADMIN — SODIUM CHLORIDE: 0.9 INJECTION, SOLUTION INTRAVENOUS at 07:11

## 2023-11-13 RX ADMIN — PROPOFOL 180 MG: 10 INJECTION, EMULSION INTRAVENOUS at 07:11

## 2023-11-13 RX ADMIN — SUGAMMADEX 200 MG: 100 INJECTION, SOLUTION INTRAVENOUS at 10:11

## 2023-11-13 RX ADMIN — SODIUM CHLORIDE: 0.9 INJECTION, SOLUTION INTRAVENOUS at 09:11

## 2023-11-13 RX ADMIN — LIDOCAINE HYDROCHLORIDE 100 MG: 20 INJECTION INTRAVENOUS at 07:11

## 2023-11-13 NOTE — TRANSFER OF CARE
"Anesthesia Transfer of Care Note    Patient: Fausto Patterson    Procedure(s) Performed: Procedure(s) (LRB):  FESS, WITH NASAL SEPTOPLASTY AND TURBINATE REDUCTION, WITH IMAGING GUIDANCE (Bilateral)    Patient location: PACU    Anesthesia Type: general    Transport from OR: Transported from OR on 6-10 L/min O2 by face mask with adequate spontaneous ventilation    Post pain: adequate analgesia    Post assessment: no apparent anesthetic complications and tolerated procedure well    Post vital signs: stable    Level of consciousness: awake    Nausea/Vomiting: no nausea/vomiting    Complications: none    Transfer of care protocol was followed      Last vitals: Visit Vitals  BP (!) 146/90 (BP Location: Left arm, Patient Position: Lying)   Pulse 72   Temp 36.6 °C (97.9 °F) (Temporal)   Resp 16   Ht 5' 10" (1.778 m)   Wt 81.6 kg (180 lb)   SpO2 99%   BMI 25.83 kg/m²     "

## 2023-11-13 NOTE — H&P
Subjective:      Fausto Patterson is a 53 y.o. male who was self-referred for nasal polyps.     He relates a history for several years of perennial congestion and facial pressure, worse on the right side, with stabbing frontal headaches, nasal blockage and variable hyposmia.  This was treated with balloon sinuplasty by Dr. Patterson about 2 years ago which helped to improve breathing on the left but otherwise the symptoms persisted.  He more recently has been treated with 2 courses of Augmentin as well as montelukast, saline, floanse and azelastine, but his symptoms have persisted.  He takes daily aspirin and denies sensitivity.     Current sinonasal medications as above.  The last course of antibiotics was as above.  He does not regularly use nasal decongestant sprays.     He recalls previously having allergy testing in his youth that resulted in a course of immunotherapy that lasted over 20 years.     He denies a history of asthma.     He denies a history of reflux symptoms.     He denies a diagnosis of obstructive sleep apnea.      He has previously had sinonasal surgery as above.  He has had a tonsillectomy.     He does not recall a prior history of nasal trauma other than the sinonasal surgery.      %        Past Medical History  He has a past medical history of Allergy, CAD (coronary artery disease), Hyperlipidemia, Kidney stone, Schatzki's ring, Unspecified disorder of kidney and ureter, and Urticaria.     Past Surgical History  He has a past surgical history that includes Coronary artery bypass graft (10/2017); Sinus surgery (2022); and Tonsillectomy.     Family History  His family history includes Arthritis in his mother; Heart disease in his father; Hypertension in his sister; No Known Problems in his daughter and daughter.     Social History  He reports that he has never smoked. He has never been exposed to tobacco smoke. He has never used smokeless tobacco. He reports that he does not drink alcohol and does not  use drugs.     Allergies  He is allergic to clarithromycin, levaquin [levofloxacin], and naldecon-ex.     Medications   He has a current medication list which includes the following prescription(s): metoprolol succinate, montelukast, rosuvastatin, aspirin, and ezetimibe.     Review of Systems      Constitutional: Negative for appetite change, chills, fatigue, fever and unexpected weight loss.       HENT: Positive for postnasal drip and stuffy nose.  Negative for ear discharge, ear infection, ear pain, facial swelling, hearing loss, mouth sores, nosebleeds, ringing in the ears, runny nose, sinus infection, sinus pressure, sore throat, tonsil infection, dental problems, trouble swallowing and voice change.       Eyes:  Negative for change in eyesight, eye drainage, eye itching and photophobia.      Respiratory:  Negative for cough, shortness of breath, sleep apnea, snoring and wheezing.       Cardiovascular:  Negative for chest pain, foot swelling, irregular heartbeat and swollen veins.      Gastrointestinal:  Negative for abdominal pain, acid reflux, constipation, diarrhea, heartburn and vomiting.      Genitourinary: Negative for difficulty urinating, sexual problems and frequent urination.      Musc: Negative for aching joints, aching muscles, back pain and neck pain.      Skin: Negative for rash.      Allergy: Negative for food allergies and seasonal allergies.      Endocrine: Negative for cold intolerance and heat intolerance.       Neurological: Negative for dizziness, headaches, light-headedness, seizures and tremors.       Hematologic: Negative for bruises/bleeds easily and swollen glands.       Psychiatric: Negative for decreased concentration, depression, nervous/anxious and sleep disturbance.                   Objective:      /85 (BP Location: Left arm, Patient Position: Sitting)   Pulse 69   Wt 85.2 kg (187 lb 13.3 oz)   BMI 27.73 kg/m²          Constitutional:   He appears well-developed. He is  cooperative. Normal speech.  No hoarse voice.       Head:  Normocephalic. Salivary glands normal.  Facial strength is normal.       Ears:     Right Ear: No drainage or tenderness. Tympanic membrane is not perforated. Tympanic membrane mobility is normal. No middle ear effusion. No decreased hearing is noted.   Left Ear: No drainage or tenderness. Tympanic membrane is not perforated. Tympanic membrane mobility is normal.  No middle ear effusion. No decreased hearing is noted.      Nose:  Mucosal edema, septal deviation and polyps present. No rhinorrhea. No epistaxis. Turbinate hypertrophy.  Turbinates normal and no turbinate masses.  Right sinus exhibits no maxillary sinus tenderness and no frontal sinus tenderness. Left sinus exhibits no maxillary sinus tenderness and no frontal sinus tenderness.   Right sided grade 2 middle meatus polyps visible     Mouth/Throat  Oropharynx clear and moist without lesions or asymmetry and normal uvula midline. He does not have dentures. Normal dentition. No oral lesions or mucous membrane lesions. No oropharyngeal exudate or posterior oropharyngeal erythema. Mirror exam not performed due to patient tolerance.  Mirror exam not performed due to patient tolerance.       Neck:  Neck normal without thyromegaly masses, asymmetry, normal tracheal structure, crepitus, and tenderness, thyroid normal, trachea normal and no adenopathy. Normal range of motion present.     He has no cervical adenopathy.      Cardiovascular:    Regular rhythm.               Pulmonary/Chest:   Effort normal.      Psychiatric:   He has a normal mood and affect. His speech is normal and behavior is normal.      Neurological:   No cranial nerve deficit.      Skin:   No rash noted.         Procedure     None today.  Images reviewed from visit with JAMIN Rubio.           Data Reviewed         WBC (K/uL)   Date Value   09/21/2023 7.10          Eosinophil % (%)   Date Value   09/21/2023 14.6 (H)          Eos # (K/uL)    Date Value   09/21/2023 1.0 (H)          Platelets (K/uL)   Date Value   09/21/2023 257          Glucose (mg/dL)   Date Value   08/28/2023 86          IgE (IU/mL)   Date Value   09/21/2023 2043 (H)      Had immunocaps with class 3 mold, class 2 dust mites, class 1 trees and weeds.      I independently reviewed the images of the CT sinuses dated 9/14/23. Pertinent findings include complete opacification of all right sinuses with central hyperdensities and partial left maxillary opacification with a left septal spur.        Assessment:      1. Perennial allergic rhinitis with seasonal variation    2. Chronic pansinusitis    3. Nasal polyposis    4. Deviated nasal septum    5. Nasal turbinate hypertrophy          Plan:      I had a long discussion with the patient regarding his condition and the further workup and management options.  He would benefit from endoscopic sinus surgery and septoplasty for the treatment of his condition.  This would include all sinuses on the right and the maxillary sinus on the left.  Inferior turbinate reduction would be included.  No significant changes.  I discussed the risks, benefits and alternatives to surgery with the patient, as well as the expected postoperative course.  I gave him the opportunity to ask questions and I answered all of them, and he consented to proceed.

## 2023-11-13 NOTE — DISCHARGE INSTRUCTIONS
INSTRUCTIONS TO FOLLOW AFTER SINUS AND NASAL SURGERY  DR. McCOUL - [facility]    Office hours:  Weekdays 8:00 am to 5:00 pm.  Please call [phone] and ask to speak with his nurse or medical assistant.    After-hours & weekends:  Please call [phone] and ask to speak with the ENT resident doctor.    Your first office visit with Dr. Keita after surgery should have been already scheduled.  If you dont know when it is, call Dr. Howard nurse or medical assistant at [phone].    Please call immediately if you have:    Temperature of 101° F or greater  Any unusual, painful swelling  Any active bleeding that saturates more than a 4x4 gauze  Any thick drainage green or yellow drainage  Changes in vision or swelling around the eye  Pain not relieved by your prescribed pain medication    ACTIVITY:    Sleep on your back with the head of the bead elevated, up on 2-3 pillows, or in a recliner for the first 3 to 5 days. This will help with swelling.     After surgery you may have a lot of nasal drainage. This is normal. You may breathe through your nose if youre able but avoid inhaling forcibly. Let all drainage fall on your mustache dressing and change it as needed.    You may wake up after surgery with thick white stockings on. Wear them until you are walking around more. It is important to walk around often while at home to keep your blood circulating and prevent blood clots.    If you use CPAP or BiPAP to sleep at night, you should wait at least 48 hours before resuming use. Dr. Keita will advise you when it is safe to do this.    You may shower 24 hours after surgery.    RESTRICTED ACTIVITIES AFTER SURGERY:    DO NOT blow your nose for 2 weeks. The only exception is that you may lightly blow your nose after using the sinus rinse. If you have to sneeze or cough, do so with your mouth open.     AVOID all heavy lifting, straining or bending for 2 weeks.     AVOID any sexual activity for 2 weeks after  surgery.    AVOID semi-contact sports or vigorous exercising for 2 weeks. Dr. Keita will let you know when you are cleared to resume exercise.    AVOID flying or swimming for 2 weeks.      DO NOT operate a motor vehicle or any type of heavy machinery within 24 hours of taking prescription pain medication.    DO NOT smoke or be around smokers.    AVOID irritating substances that might make you sneeze, such as dust, chalk, harsh chemicals, and allergic triggers. This might also include spicy foods.    DRESSINGS:    Change the gauze mustache dressing under your nose as needed. (If unsure what this dressing is or how to do this, ask your doctor or nurse before you leave the hospital.) You may have pinkish-red drainage for 2-3 days.    Usually there is no gauze packing placed inside the nose.  If packing is necessary, you will be informed by your surgeon.  Do not touch or pull at the packing. The packing will be removed by your doctor at your first visit after surgery.     You may also have a dissolvable stent or dissolvable sponge placed into the sinuses during surgery.  These usually do not need to be removed unless you are told otherwise by Dr. Keita.  You may notice small fragments of these items come out of your nose in the weeks following surgery.    MEDICATIONS:    After surgery, you will be sent home with prescriptions for pain medication and an anti-nausea medication. Antibiotics are usually not necessary.    Most people need pain medication for the first few days after surgery, although a narcotic is rarely necessary. The best pain control comes from a combination of ACETAMINOPHEN (Tylenol) and IBUPROFEN (Motrin). You will be given prescriptions for these at the recommended dose. These can be alternated so that you are taking something every 2 or 3 hours.    Some people have problems with bowel movements after surgery. If you have NOT had a bowel movement 3-5 days after surgery, go to your local pharmacy and  purchase an over the counter stool softener such as COLACE. You can also ask the pharmacist for his or her recommendation. If you still do not have a bowel movement after starting the softener, please call the office.    You may be given an ointment called MUPIROCIN to use after surgery. If you are given this, use a cotton swab to apply gently inside the nostrils. Do this 2 times a day for 1 week.    You will need these over-the-counter medications after surgery:    SALINE SINUS RINSE (Rex Med brand): You will use this to rinse out your nose and sinuses after surgery. Begin doing this the day after surgery, unless instructed otherwise by Dr. Keita.  You should do this 2 times a day, following the instructions on the box.    AFRIN (regular strength): Only use if you have nasal congestion or bleeding. Use 2 times per day for 3 days, stop for 1 day, continue 2 times per day for 3 days, then stop completely.  NOTE:  You may not need to do this at all.    DIET:    Avoid hot and spicy foods for 1 week after surgery.    Begin with bland foods the evening after surgery and advance to your regular diet as tolerated. It is not necessary to take only soft food unless you are recovering from tonsil surgery.    Drink plenty of fluids (water is best).     Avoid alcoholic and caffeinated beverages for 1 week after surgery because they can cause you to become dehydrated.

## 2023-11-13 NOTE — BRIEF OP NOTE
[facility]  Brief Operative Note/Discharge Note  Short Stay    Procedure(s) (LRB):  Procedure(s):  FESS, WITH NASAL SEPTOPLASTY AND TURBINATE REDUCTION, WITH IMAGING GUIDANCE    EBL: 150 cc    Surgery Date: 11/13/2023     Surgeon(s) and Role:     * Khushi Cadet, MD - Primary    Pre-op Diagnosis:  Chronic pansinusitis [J32.4]  Nasal polyposis [J33.9]  Deviated nasal septum [J34.2]  Nasal turbinate hypertrophy [J34.3]    Post-op Diagnosis:  Post-Op Diagnosis Codes:     * Chronic pansinusitis [J32.4]     * Nasal polyposis [J33.9]     * Deviated nasal septum [J34.2]     * Nasal turbinate hypertrophy [J34.3]    Procedure(s) (LRB):  FESS, WITH NASAL SEPTOPLASTY AND TURBINATE REDUCTION, WITH IMAGING GUIDANCE (Bilateral)    Anesthesia: General    Operative Findings: See full op note for full details    Estimated Blood Loss: * No values recorded between 11/13/2023  7:28 AM and 11/13/2023 10:59 AM *            Specimens:   Specimens (From admission, onward)       Start     Ordered    11/13/23 1027  Specimen to Pathology, Surgery ENT  Once        Comments: Pre-op Diagnosis: Chronic pansinusitis [J32.4]Nasal polyposis [J33.9]Deviated nasal septum [J34.2]Nasal turbinate hypertrophy [J34.3]Procedure(s):FESS, USING COMPUTER-ASSISTED NAVIGATIONSEPTOPLASTY, NOSE, WITH NASAL TURBINATE REDUCTION Number of specimens: Name of specimens: 1)right ethmoid  sinus, 2) right maxillary sinus 3) Left maxillary sinus content, 4) right cami bullosa     References:    Click here for ordering Quick Tip   Question Answer Comment   Procedure Type: ENT    Specimen Class: Routine/Screening    Which provider would you like to cc? KHUSHI CADET    Release to patient Immediate        11/13/23 1027                    Implants:  Implant Name Type Inv. Item Serial No.  Lot No. LRB No. Used Action   IMPLANT PROPEL MOMETASONE - [id number]  IM[facility]ET[facility]ONE  [facility] [id number] Left 1  Implanted       Discharge Note    OUTCOME: Patient tolerated treatment/procedure well without complication and is now ready for discharge.    DISPOSITION: Home or Self Care\    PREOPERATIVE DIAGNOSIS: Pre-Op Diagnosis Codes:     * Chronic pansinusitis [J32.4]     * Nasal polyposis [J33.9]     * Deviated nasal septum [J34.2]     * Nasal turbinate hypertrophy [J34.3]     FINAL DIAGNOSIS:  same as preop, see above  Post-Op Diagnosis Codes:     * Chronic pansinusitis [J32.4]     * Nasal polyposis [J33.9]     * Deviated nasal septum [J34.2]     * Nasal turbinate hypertrophy [J34.3]    FOLLOWUP: In clinic    DISCHARGE INSTRUCTIONS:  See discharge tab for complete details. Discussed with patient/family preop.    No discharge procedures on file.    TIME SPENT ON DISCHARGE: 35 minutes

## 2023-11-13 NOTE — PLAN OF CARE
Discharge instructions reviewed with pt. Pt v/u of all instructions. VSS. IV removed with catheter tip intact. No concerns voiced; all questions answered. Escorted patient via wheelchair to family member awaiting in private vehicle.

## 2023-11-13 NOTE — ANESTHESIA POSTPROCEDURE EVALUATION
Anesthesia Post Evaluation    Patient: Fausto Patterson    Procedure(s) Performed: Procedure(s) (LRB):  FESS, WITH NASAL SEPTOPLASTY AND TURBINATE REDUCTION, WITH IMAGING GUIDANCE (Bilateral)    Final Anesthesia Type: general      Patient location during evaluation: PACU  Patient participation: Yes- Able to Participate  Level of consciousness: awake and alert, oriented and awake  Post-procedure vital signs: reviewed and stable  Pain management: adequate  Airway patency: patent  NICOLAS mitigation strategies: Multimodal analgesia  PONV status at discharge: No PONV  Anesthetic complications: no      Cardiovascular status: blood pressure returned to baseline and hemodynamically stable  Respiratory status: unassisted and spontaneous ventilation  Hydration status: euvolemic  Follow-up not needed.          Vitals Value Taken Time   BP 97/55 11/13/23 1217   Temp 37.2 °C (99 °F) 11/13/23 1102   Pulse 68 11/13/23 1221   Resp 12 11/13/23 1221   SpO2 95 % 11/13/23 1221   Vitals shown include unvalidated device data.      No case tracking events are documented in the log.      Pain/Radha Score: Radha Score: 9 (11/13/2023 12:00 PM)

## 2023-11-13 NOTE — ANESTHESIA PROCEDURE NOTES
Intubation    Date/Time: 11/13/2023 7:31 AM    Performed by: Millicent Toth CRNA  Authorized by: Sal Shrestha MD    Intubation:     Induction:  Intravenous    Intubated:  Postinduction    Mask Ventilation:  Easy mask    Attempts:  1    Attempted By:  CRNA    Method of Intubation:  Video laryngoscopy    Blade:  Singh 4    Laryngeal View Grade: Grade I - full view of cords      Difficult Airway Encountered?: No      Complications:  None    Airway Device:  Oral endotracheal tube    Airway Device Size:  7.5    Style/Cuff Inflation:  Cuffed (inflated to minimal occlusive pressure)    Tube secured:  22    Secured at:  The lips    Placement Verified By:  Capnometry    Complicating Factors:  None    Findings Post-Intubation:  BS equal bilateral and atraumatic/condition of teeth unchanged

## 2023-11-13 NOTE — OP NOTE
DATE OF OPERATION: 11/13/2023    SURGEON:  Willie Keita MD     ASSISTANT SURGEON:  Marni Mcgee MD     OPERATION:     1. Endoscopic septoplasty.  2. Bilateral inferior turbinate reduction with submucosal resection.  3. Right image-guided endoscopic total ethmoidectomy.  4. Bilateral image-guided endoscopic maxillary antrostomy.  5. Right image-guided endoscopic sphenoidotomy.  6. Right image-guided endoscopic frontal dissection with Draf IIA sinusotomy.  7. Right endoscopic cami bullosa resection.     PREOPERATIVE DIAGNOSIS:      1. Deviated nasal septum.  2. Hypertrophic turbinates.  3. Chronic rhinosinusitis.  4. Sinonasal polyposis.     POSTOPERATIVE DIAGNOSIS:      1. Deviated nasal septum.  2. Hypertrophic turbinates.  3. Chronic rhinosinusitis.  4. Sinonasal polyposis.     ANESTHESIA: Total intravenous general anesthesia.     COMPLICATIONS: None.     ESTIMATED BLOOD LOSS: 150 mL     SPECIMEN: Right ethmoid. Right cami bullosa.  Bilateral maxillary sinus contents.  Right maxillary and ethmoid sinus cultures for bacteria and fungus.     WOUND EXPECTANCY: Infected.    DRESSING: Propel in the right middle meatus. No nasal packing.    FINDINGS: Leftward septal deviation with vomerian spur.  Compound inferior turbinate hypertrophy.  Diffuse polyposis involving all right-sided sinuses and the left maxillary and middle meatus.  Inspissated fungal-type mucinous debris in bilateral maxillary and right ethmoid and frontal sinuses.  Bifurcated right frontal sinus.  Right cami bullosa.     INDICATIONS: Chronic rhinosinusitis and anatomic nasal obstruction, not controlled with maximal medical therapy.     I discussed the risks, benefits and alternatives of surgical correction of the septal deviation, turbinate hypertrophy and chronically obstructed sinuses with the patient as well as the expected postoperative course. I gave him the opportunity to ask questions and I answered all of them. On the morning of  surgery I again met with the patient and reviewed the indications for surgery and he consented to proceed.     DESCRIPTION OF PROCEDURE: The patient was brought to the operating room and placed supine on the operating table. The patient was placed under general anesthesia and intubated. The patient was positioned with a donut under the head and the image-guidance headset for the [employer] system was applied.  Image-guided navigation was indicated to facilitate exenteration of all ethmoid cells and the extent of sinusotomy.  The CT scan disc was loaded into the image-guidance system and registered with the patient tracking system according to the 's instructions. The pointer was calibrated and registration was verified using predefined landmarks.  Cottonoid pledgets soaked with 4% cocaine were placed into the nasal cavity bilaterally for mucosal decongestion. The mucosa of the nasal septum was injected with 1% lidocaine with 1:100,000 parts epinephrine. Prophylactic antibiotics were not indicated prior to the surgery start. A time-out was performed to confirm the proper patient, site and procedure.  The CT images were again reviewed prior to the surgical start. The bed was placed in 15-degree reverse Trendelenberg position. The patient was prepped and draped in the usual fashion.       Surgery began with performance of submucous resection of the nasal septum. This began with additional injections, assisted by a 0-degree endoscope. Then, a Raul incision was made using a #15 blade on the left side. The submucoperichondrial plane was identified and this was elevated using a Weston elevator. This plane was carried posteriorly to the bony-cartilaginous junction.  A Weston elevator was used to incise the cartilage at the junction and a contralateral mucoperiosteal flap was elevated. Then, using a 0-degree endoscope, the septal pocket was visualized and there was an additional long process of cartilage  along the floor causing a deviation. This was resected using a Ouray elevator and was removed.       Additional elevation of the flaps over the vomer revealed a large spur that was jutting into the middle meatus. This portion of the bone was resected top and bottom using a Fomon scissors and the spur was removed using a Maryana forceps. After removal, there was a small perforation on the left side of the mucoperichondrial flap, but the contralateral flap remained intact. At this point, 10,000 units of topical thrombin with 1:10,000 parts epinephrine was applied to pledgets and placed between the flaps for topical hemostasis.  After these pledgets were removed, there was excellent hemostasis at the septal site.       Then, under endoscopic visualization a transseptal quilting suture of 4-0 plain gut was used to reapproximate the nasal septal flaps.  Then the hemitransfixion incision was closed using 4-0 chromic gut suture in figure-of-eight fashion times two.  Repeated nasal endoscopy at this point revealed marked improvement of the septal deviation and good visualization of the vertical suspension of both middle turbinates.     Attention was then turned to the turbinates.  Additional injections were performed around the inferior turbinates and the sphenopalatine ganglion region bilaterally.  Incisions were made in the anterior head of the inferior turbinate using a #6400 blade.  A Ouray elevator was then tunnelled medially to the turbinate bone and used to segmentally outfracture and morselize the bone.  Then, a 2 mm blade on the powered tissue shaver was tunneled submucosally and used to resect soft tissue of the turbinate while overlying mucosa was preserved.   Finally, the turbinates were outfractured using a Pandya/Boies elevator.  An identical procedure was performed bilaterally.     Attention was then turned to the sinuses. Large polyps were found to be protruding from the middle meatus and were extracted  and sent to the pathologist. The right middle meatus was topically decongested using thrombin with epinephrine pledgets.   The uncinate process was then visualized and a backbiter was used to incise the uncinate process. The uncinate was dissected to its superior attachment and removed using cutting forceps.  A cami bullosa was present.  The cami bullosa obstructed access to the middle meatus.  A vertical incision was made at the anterior head of the turbinate using a sickle knife.  The incision was carried posteriorly and inferiorly using a [place] scissors.  The lateral half of the conchal cell was then removed, taking care not to destabilize the superior attachment of the middle turbinate.     After removal of the bone, the natural ostium of the maxillary sinus was visible. The lumen was visualized with a 30-degree scope and entered using a curved suction to confirm the dimension. The antrostomy was enlarged with cutting instruments to include the natural sinus ostium, taking care not to move anterior to the maxillary line so as to avoid injury to the nasolacrimal duct.  Additional bone was removed using forceps. Polypoid tissue intermixed with purulent exudate and mucinous debris was present within the maxillary sinus. This was thoroughly removed and sent for pathologic evaluation and also sampled for bacterial and fungal cultures.     The ethmoid bulla was entered with non-powered instrumentation and anterior ethmoidectomy was performed.  The roof of the bulla was removed with forceps and the suprabullar recess was exposed. The grand lamella of the middle turbinate was then traversed and posterior ethmoidectomy was performed.  An Onodi cell was not present.  The mucosa was hypertrophic throughout the sinuses, indicative of chronic inflammation.  Several cells contained mucinous fungal-like debris that was removed.  A microdebrider was used sparingly to remove residual mucosal fragments.  Topical hemostatic  agents on pledgets were then placed into the ethmoid cavity for hemostasis.    The sphenoid sinus rostrum was identified and the sinus was entered in a low and medial fashion, adjacent to the superior turbinate. This sphenoidotomy was enlarged to include the posterior segment of this superior turbinate and the natural ostium of the sphenoid sinus. Polypoid tissue  was present within the sphenoid sinus. This was thoroughly removed and sent for pathologic evaluation.    Dissection was performed at the site of the nasofrontal recess.  Using a 70-degree scope, a suprabullar cell was dissected and uncapped in a medial-to-lateral direction.  An agger nasi cell was then opened from an retrograde approach.  A supra-agger frontal cell was present which was also opened in a retrograde fashion using angulated cutting instruments.  A bifurcated frontal sinus was present, with each lumen containing the same mucinous fungal-like debris, which was all completely removed.  Afterward, the frontal sinus ostium was visible but stenotic.  Therefore, the ostium was enlarged anteriorly using cutting instruments, taking care to avoid circumerential mucosal injury.  Powered instrumentation was not required.  The floor of the frontal sinus was removed between the lamina of the orbit and the suspension of the middle turbinate to complete a Draf IIA sinusotomy.  The anterior ethmoidal artery was identified and avoided with assistance from the image-guidance system probe.  At the conclusion of dissection there was excellent visualization into the frontal sinus, which would not otherwise have been possible.     Attention was then turned to the right side of the sinonasal tract.  A similar procedure was performed on this side, including uncinectomy and maxillary antrostomy.  Polypoid mucosa was noted on the bulla which was shaved but an ethmoidectomy was not performed due to minimal opacification on the preoperative CT scan.     At the  conclusion of these procedures, the image-guidance probe was used to verify that all cells had been properly opened and that the skull base was visible and that the lamina papyracea had not been traversed.  All sinuses were copiously irrigated with warm normal saline solution.     At this point, the pledgets were all removed. Isac hemostatic agent was placed into the surgical sites. A Propel steroid-eluting stent was placed into the right ethmoid cavity.  The nasal cavity was not packed.  Mupirocin ointment was applied to the vestibule bilaterally.  At this point, the headset was removed and the drapes were taken down. The patient was turned back toward the anesthesiologist and awakened from anesthesia, extubated and transferred to the recovery room in stable condition.

## 2023-11-13 NOTE — PLAN OF CARE
Chart reviewed. Preop nursing care completed per orders. Safe surgery checklist complete. Pt denies any open wounds cuts or sores.Pt denies any metal in body.Family at bedside and belongings secured in locker. Waiting for anesthesia consent prior to surgery. Pt AAOX3, VSS on room air. Bed locked in lowest position, Call light within reach. Pt denies any needs at this time. Will continue to monitor.

## 2023-11-15 LAB
FINAL PATHOLOGIC DIAGNOSIS: NORMAL
GROSS: NORMAL
Lab: NORMAL

## 2023-11-16 ENCOUNTER — PATIENT MESSAGE (OUTPATIENT)
Dept: OTOLARYNGOLOGY | Facility: CLINIC | Age: 53
End: 2023-11-16
Payer: COMMERCIAL

## 2023-11-16 DIAGNOSIS — J32.4 CHRONIC PANSINUSITIS: Primary | ICD-10-CM

## 2023-11-16 LAB — BACTERIA SPEC AEROBE CULT: ABNORMAL

## 2023-11-16 RX ORDER — SULFAMETHOXAZOLE AND TRIMETHOPRIM 800; 160 MG/1; MG/1
1 TABLET ORAL 2 TIMES DAILY
Qty: 20 TABLET | Refills: 0 | Status: SHIPPED | OUTPATIENT
Start: 2023-11-16 | End: 2023-11-26

## 2023-11-17 LAB
BACTERIA SPEC ANAEROBE CULT: NORMAL
BACTERIA SPEC ANAEROBE CULT: NORMAL

## 2023-11-21 ENCOUNTER — OFFICE VISIT (OUTPATIENT)
Dept: OTOLARYNGOLOGY | Facility: CLINIC | Age: 53
End: 2023-11-21
Payer: COMMERCIAL

## 2023-11-21 VITALS
BODY MASS INDEX: 26.54 KG/M2 | SYSTOLIC BLOOD PRESSURE: 128 MMHG | DIASTOLIC BLOOD PRESSURE: 85 MMHG | RESPIRATION RATE: 18 BRPM | WEIGHT: 184.94 LBS | HEART RATE: 69 BPM

## 2023-11-21 DIAGNOSIS — J30.89 PERENNIAL ALLERGIC RHINITIS WITH SEASONAL VARIATION: ICD-10-CM

## 2023-11-21 DIAGNOSIS — J30.2 PERENNIAL ALLERGIC RHINITIS WITH SEASONAL VARIATION: ICD-10-CM

## 2023-11-21 DIAGNOSIS — J32.4 CHRONIC PANSINUSITIS: Primary | ICD-10-CM

## 2023-11-21 DIAGNOSIS — J33.8 OTHER POLYP OF SINUS: ICD-10-CM

## 2023-11-21 PROCEDURE — 99024 PR POST-OP FOLLOW-UP VISIT: ICD-10-PCS | Mod: S$GLB,,, | Performed by: PHYSICIAN ASSISTANT

## 2023-11-21 PROCEDURE — 31237 NSL/SINS NDSC SURG BX POLYPC: CPT | Mod: 79,50,S$GLB, | Performed by: PHYSICIAN ASSISTANT

## 2023-11-21 PROCEDURE — 99999 PR PBB SHADOW E&M-EST. PATIENT-LVL III: ICD-10-PCS | Mod: PBBFAC,,, | Performed by: PHYSICIAN ASSISTANT

## 2023-11-21 PROCEDURE — 99999 PR PBB SHADOW E&M-EST. PATIENT-LVL III: CPT | Mod: PBBFAC,,, | Performed by: PHYSICIAN ASSISTANT

## 2023-11-21 PROCEDURE — 31237 NASAL/SINUS ENDOSCOPY: ICD-10-PCS | Mod: 79,50,S$GLB, | Performed by: PHYSICIAN ASSISTANT

## 2023-11-21 PROCEDURE — 99024 POSTOP FOLLOW-UP VISIT: CPT | Mod: S$GLB,,, | Performed by: PHYSICIAN ASSISTANT

## 2023-11-21 NOTE — PROGRESS NOTES
Subjective:      Fausto Patterson is a 53 y.o. 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 Willie Keita).  His last visit with me was on 9/11/2023.      Patient reports improvement in nasal obstruction in the last few days after left more than right clots drained with saline rinse.  Patient using saline rinse several times daily since surgery.  Patient not currently on any nasal sprays. Patient denies any facial pressure but does report some L nasal cavity pain that he feels may be 2/2 septoplasty.       Objective:     /85 (BP Location: Right arm, Patient Position: Sitting, BP Method: Large (Automatic))   Pulse 69   Resp 18   Wt 83.9 kg (184 lb 15.5 oz)   BMI 26.54 kg/m²      General:   not in distress   Nasal:  edematous mucosa   no septal hematoma   no bleeding   Oral Cavity:   clear   Oropharynx:   no bleeding   Neck:   nontender       Procedure    Nasal endoscopy performed.  See procedure note.    Data Reviewed    Pathology report indicated chronic inflammation with eosinophilia.  Cultures showed +x maltophilia, +klebsiella +fungus (rare). Started on Bactrim 11/16/23    Assessment:     Doing well following endoscopic sinus surgery.  He also underwent septum/turbinate surgery which is unrelated to the reason for today's visit.    1. Chronic pansinusitis    2. Other polyp of sinus    3. Perennial allergic rhinitis with seasonal variation         Plan:     Continue saline rinse  Complete Bactrim    One month follow up POV #2 scheduled

## 2023-11-21 NOTE — PROCEDURES
"Nasal/sinus endoscopy    Date/Time: 11/21/2023 10:30 AM    Time out: Immediately prior to procedure a "time out" was called to verify the correct patient, procedure, equipment, support staff and site/side marked as required.    Performed by: Ramón Rubio PA-C  Authorized by: Ramón Rubio PA-C    Anesthesia:     Local anesthetic:  Lidocaine 4% and Andrea-Synephrine 1/2%    Location: Bilateral.    Endoscope type: Rigid.    Patient tolerance:  Patient tolerated the procedure well with no immediate complications  Nose:     Procedure Performed:  Nasal Endoscopy and Removal of Debridement  Intranasal:      Mucosa no polyps     Mucosa ulcers not present     No mucosa lesions present     Turbinates not enlarged     No septum gross deformity  Nasopharynx:      No mucosa lesions     Adenoids present     Posterior choanae patent     Eustachian tube patent     Debridement of eschars in BL max antrums  R propel in place  No gross mucopurulence noted    "

## 2023-11-30 LAB
BACTERIA SPEC AEROBE CULT: ABNORMAL

## 2023-12-11 LAB
FUNGUS SPEC CULT: NORMAL
FUNGUS SPEC CULT: NORMAL

## 2023-12-27 ENCOUNTER — OFFICE VISIT (OUTPATIENT)
Dept: OTOLARYNGOLOGY | Facility: CLINIC | Age: 53
End: 2023-12-27
Payer: COMMERCIAL

## 2023-12-27 VITALS — BODY MASS INDEX: 27.3 KG/M2 | WEIGHT: 190.25 LBS

## 2023-12-27 DIAGNOSIS — J32.4 CHRONIC PANSINUSITIS: Primary | ICD-10-CM

## 2023-12-27 DIAGNOSIS — J33.8 OTHER POLYP OF SINUS: ICD-10-CM

## 2023-12-27 DIAGNOSIS — J34.89 NASAL OBSTRUCTION WITHOUT CHOANAL ATRESIA: ICD-10-CM

## 2023-12-27 PROCEDURE — 99024 POSTOP FOLLOW-UP VISIT: CPT | Mod: S$GLB,,, | Performed by: PHYSICIAN ASSISTANT

## 2023-12-27 PROCEDURE — 99999 PR PBB SHADOW E&M-EST. PATIENT-LVL III: CPT | Mod: PBBFAC,,, | Performed by: PHYSICIAN ASSISTANT

## 2023-12-27 PROCEDURE — 31237 NASAL/SINUS ENDOSCOPY: ICD-10-PCS | Mod: 79,50,S$GLB, | Performed by: PHYSICIAN ASSISTANT

## 2023-12-27 PROCEDURE — 31237 NSL/SINS NDSC SURG BX POLYPC: CPT | Mod: 79,50,S$GLB, | Performed by: PHYSICIAN ASSISTANT

## 2023-12-27 PROCEDURE — 99024 PR POST-OP FOLLOW-UP VISIT: ICD-10-PCS | Mod: S$GLB,,, | Performed by: PHYSICIAN ASSISTANT

## 2023-12-27 PROCEDURE — 99999 PR PBB SHADOW E&M-EST. PATIENT-LVL III: ICD-10-PCS | Mod: PBBFAC,,, | Performed by: PHYSICIAN ASSISTANT

## 2023-12-27 NOTE — PROGRESS NOTES
Subjective:      Fausto Patterson is a 53 y.o. 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 Willie Keita) .  His last visit with me was on 11/21/2023.      Patient reports doing well since last visit.  Patient denies any further left septal tenderness/pain.  Patient reports improved nasal obstruction.  Patient continues with saline rinses daily.      Objective:     Wt 86.3 kg (190 lb 4.1 oz)   BMI 27.30 kg/m²      General:   not in distress   Nasal:  edematous mucosa   no septal hematoma   no bleeding  Crusting on R anterior MT   Oral Cavity:   clear   Oropharynx:   no bleeding   Neck:   nontender       Procedure    Endoscopic debridement performed.  See procedure note.     L NV with green crusting adhered to part of propel stents          Data Reviewed    Pathology report indicated chronic inflammation with eosinophilia.  Cultures showed +x maltophilia, +klebsiella +fungus (rare). Started on Bactrim 11/16/23       Assessment:     Doing well following endoscopic sinus surgery.  He also underwent septum/turbinate surgery which is unrelated to the reason for today's visit.    1. Chronic pansinusitis    2. Other polyp of sinus    3. Nasal obstruction without choanal atresia         Plan:     Continue saline rinse    Follow up for three month post op

## 2023-12-27 NOTE — PROCEDURES
"Nasal/sinus endoscopy    Date/Time: 12/27/2023 9:30 AM    Time out: Immediately prior to procedure a "time out" was called to verify the correct patient, procedure, equipment, support staff and site/side marked as required.    Performed by: Ramón Rubio PA-C  Authorized by: Ramón Rubio PA-C    Consent Done?:  Yes (Verbal)  Anesthesia:     Local anesthetic:  Lidocaine 4% and Andrea-Synephrine 1/2%    Location: Bilateral.    Endoscope type: Rigid.    Patient tolerance:  Patient tolerated the procedure well with no immediate complications  Nose:     Procedure Performed:  Removal of Debridement  External:      No external nasal deformity  Intranasal:      Mucosa no polyps     Mucosa ulcers not present     No mucosa lesions present     Turbinates not enlarged     No septum gross deformity  Nasopharynx:      No mucosa lesions     Adenoids present     Posterior choanae patent     Eustachian tube patent     Debridement of R ethmoid eschar using suction + navarro forceps  Removed propel fragments using suction  Crusting removed with suction    "

## 2024-03-04 ENCOUNTER — OFFICE VISIT (OUTPATIENT)
Dept: OTOLARYNGOLOGY | Facility: CLINIC | Age: 54
End: 2024-03-04
Payer: COMMERCIAL

## 2024-03-04 VITALS
HEART RATE: 73 BPM | SYSTOLIC BLOOD PRESSURE: 145 MMHG | WEIGHT: 194.44 LBS | BODY MASS INDEX: 27.9 KG/M2 | DIASTOLIC BLOOD PRESSURE: 94 MMHG

## 2024-03-04 DIAGNOSIS — J06.9 VIRAL URI WITH COUGH: Primary | ICD-10-CM

## 2024-03-04 DIAGNOSIS — J34.89 NASAL OBSTRUCTION WITHOUT CHOANAL ATRESIA: ICD-10-CM

## 2024-03-04 DIAGNOSIS — J33.8 OTHER POLYP OF SINUS: ICD-10-CM

## 2024-03-04 DIAGNOSIS — J32.4 CHRONIC PANSINUSITIS: ICD-10-CM

## 2024-03-04 PROCEDURE — 99024 POSTOP FOLLOW-UP VISIT: CPT | Mod: S$GLB,,, | Performed by: PHYSICIAN ASSISTANT

## 2024-03-04 PROCEDURE — 99999 PR PBB SHADOW E&M-EST. PATIENT-LVL III: CPT | Mod: PBBFAC,,, | Performed by: PHYSICIAN ASSISTANT

## 2024-03-04 NOTE — PROGRESS NOTES
Subjective:      Fausto Patterson is a 54 y.o. male with CRSwNP who comes for follow-up over 3 months status-post endoscopic sinus surgery (11/13/23 with Dr. Willie Keita) .  His last visit with me was on 12/27/2023.      Patient doing well since last visit.  Patient using saline rinse.  No nasal obstruction reported patient does report developing chest congestion with associated cough itchy throat, and hoarseness about a week and a half ago.  Patient reports symptoms significantly improved and wife recently diagnosed with strep    Objective:     BP (!) 145/94 (BP Location: Left arm, Patient Position: Sitting, BP Method: Large (Automatic))   Pulse 73   Wt 88.2 kg (194 lb 7.1 oz)   BMI 27.90 kg/m²      General:   not in distress   Nasal:  Non-edematous mucosa   no septal hematoma   no bleeding   Oral Cavity:   clear   Oropharynx:   no bleeding   Neck:   nontender       Procedure    None    Data Reviewed    Pathology report indicated chronic inflammation with eosinophilia.  Cultures showed +x maltophilia, +klebsiella +fungus (rare). Started on Bactrim 11/16/23 and completed 7.5 days of treatment (reaction of itching)    Assessment:     Doing well following endoscopic sinus surgery.  He also underwent septum/turbinate surgery which is unrelated to the reason for today's visit.    1. Viral URI with cough    2. Chronic pansinusitis    3. Other polyp of sinus    4. Nasal obstruction without choanal atresia         Plan:     Continue saline rinse PRN  START INCS for suppression of polypoid tissue    He will follow up as needed

## 2024-03-11 DIAGNOSIS — E78.2 MIXED HYPERLIPIDEMIA: ICD-10-CM

## 2024-03-11 RX ORDER — ROSUVASTATIN CALCIUM 40 MG/1
40 TABLET, COATED ORAL DAILY
Qty: 90 TABLET | Refills: 0 | Status: SHIPPED | OUTPATIENT
Start: 2024-03-11 | End: 2024-05-26 | Stop reason: SDUPTHER

## 2024-03-14 ENCOUNTER — PATIENT MESSAGE (OUTPATIENT)
Dept: CARDIOLOGY | Facility: CLINIC | Age: 54
End: 2024-03-14
Payer: COMMERCIAL

## 2024-03-19 ENCOUNTER — OFFICE VISIT (OUTPATIENT)
Dept: CARDIOLOGY | Facility: CLINIC | Age: 54
End: 2024-03-19
Payer: COMMERCIAL

## 2024-03-19 VITALS
HEART RATE: 70 BPM | OXYGEN SATURATION: 98 % | WEIGHT: 191.56 LBS | BODY MASS INDEX: 27.42 KG/M2 | DIASTOLIC BLOOD PRESSURE: 86 MMHG | HEIGHT: 70 IN | SYSTOLIC BLOOD PRESSURE: 127 MMHG

## 2024-03-19 DIAGNOSIS — E78.2 MIXED HYPERLIPIDEMIA: ICD-10-CM

## 2024-03-19 DIAGNOSIS — I25.10 CORONARY ARTERY DISEASE INVOLVING NATIVE CORONARY ARTERY OF NATIVE HEART WITHOUT ANGINA PECTORIS: Primary | ICD-10-CM

## 2024-03-19 DIAGNOSIS — Z95.1 S/P CABG X 3: ICD-10-CM

## 2024-03-19 DIAGNOSIS — I25.810 CORONARY ARTERY DISEASE INVOLVING CORONARY BYPASS GRAFT OF NATIVE HEART WITHOUT ANGINA PECTORIS: ICD-10-CM

## 2024-03-19 PROCEDURE — 99214 OFFICE O/P EST MOD 30 MIN: CPT | Mod: S$GLB,,, | Performed by: PHYSICIAN ASSISTANT

## 2024-03-19 PROCEDURE — 99999 PR PBB SHADOW E&M-EST. PATIENT-LVL IV: CPT | Mod: PBBFAC,,, | Performed by: PHYSICIAN ASSISTANT

## 2024-03-19 RX ORDER — METOPROLOL SUCCINATE 50 MG/1
50 TABLET, EXTENDED RELEASE ORAL DAILY
Qty: 90 TABLET | Refills: 3 | Status: SHIPPED | OUTPATIENT
Start: 2024-03-19 | End: 2025-03-14

## 2024-03-19 RX ORDER — EZETIMIBE 10 MG/1
10 TABLET ORAL DAILY
Qty: 30 TABLET | Refills: 11 | Status: SHIPPED | OUTPATIENT
Start: 2024-03-19 | End: 2025-03-19

## 2024-03-19 NOTE — PROGRESS NOTES
"    General Cardiology Clinic Note  Reason for Visit: Annual follow up   Last Clinic Visit: 1/25/2023  General Cardiologist: Dr. Sullivan     HPI:   Fausto Patterson is a 54 y.o. male who presents for annual follow up.     Problems:  CAD s/p CABG x3 10/6/2017 (LIMA-LAD, BETH-RCA, SVG-OM1)  Hyperlipidemia  GERD     HPI  Patient presents for annual follow up. Doing well. He has some dyspnea if running up a flight of stairs or walking for a mile while talking at the same time. He is still exercising on the elliptical 3-4 times a week for at least 30 minutes without any YANEZ or chest pressure. He denies orthopnea, PND. He has some chronic LLE edema since the bypass surgery. He denies syncope, lightheadedness, palpitations. He brought a home BP log and his BP is 110s-120s/70s.    1/25/2023 HPI  Patient presents for follow up. He reports he was having an intermittent "buzzing" sensation in his chest at rest for a couple weeks. It would last seconds. He hasn't felt it for a couple months now. For the past week, he has had some nonsustained palpitations, described as more forceful beats. He has noticed them at rest and it lasts < 1 minute. He does reports more stress than usual. Otherwise has been feeling well. He exercises for 25-30 minutes on either the treadmill or elliptical, at least 4 days a week. No symptoms with this. He denies exertional chest pain, YANEZ, orthopnea, PND, edema, syncope, frequent lightheadedness. Denies symptoms of TIA. He has significantly improved his diet over the past few weeks and has lost some weight as a results. His BP at home has been 120s-130s/80s.       ROS:      Review of Systems   Constitutional: Negative for diaphoresis, malaise/fatigue, weight gain and weight loss.   HENT:  Negative for nosebleeds.    Eyes:  Negative for vision loss in left eye, vision loss in right eye and visual disturbance.   Cardiovascular:  Positive for dyspnea on exertion and leg swelling. Negative for chest pain, " claudication, irregular heartbeat, near-syncope, orthopnea, palpitations, paroxysmal nocturnal dyspnea and syncope.   Respiratory:  Negative for cough, shortness of breath, sleep disturbances due to breathing, snoring and wheezing.    Hematologic/Lymphatic: Negative for bleeding problem. Does not bruise/bleed easily.   Skin:  Negative for poor wound healing and rash.   Musculoskeletal:  Negative for muscle cramps and myalgias.   Gastrointestinal:  Negative for bloating, abdominal pain, diarrhea, heartburn, melena, nausea and vomiting.   Genitourinary:  Negative for hematuria and nocturia.   Neurological:  Negative for brief paralysis, dizziness, headaches, light-headedness, numbness and weakness.   Psychiatric/Behavioral:  Negative for depression.    Allergic/Immunologic: Negative for hives.       PMH:     Past Medical History:   Diagnosis Date    Allergy     CAD (coronary artery disease)     Hyperlipidemia     Kidney stone     Schatzki's ring     Unspecified disorder of kidney and ureter     calcium oxalate nephrolithiasis    Urticaria      Past Surgical History:   Procedure Laterality Date    CORONARY ARTERY BYPASS GRAFT  10/2017    x3    FESS, WITH NASAL SEPTOPLASTY AND TURBINATE REDUCTION, WITH IMAGING HECTOR Bilateral 11/13/2023    Procedure: FESS, WITH NASAL SEPTOPLASTY AND TURBINATE REDUCTION, WITH IMAGING GUIDANCE;  Surgeon: Willie Keita MD;  Location: [facility];  Service: ENT;  Laterality: Bilateral;  Bilateral Maxillary/ Right Frontal/ Right sphenoid/TIVA/ Stealth/ 180 mins    SINUS SURGERY  2022    TONSILLECTOMY       Allergies:     Review of patient's allergies indicates:   Allergen Reactions    Clarithromycin Other (See Comments)     hiccups    Levaquin [levofloxacin] Hives    Naldecon-ex Hives     Medications:     Current Outpatient Medications on File Prior to Visit   Medication Sig Dispense Refill    acetaminophen (TYLENOL) 650 MG TbSR Take 1 tablet (650 mg total) by mouth every 8 (eight) hours as  "needed (pain). 24 tablet 0    aspirin (ECOTRIN) 81 MG EC tablet Take 1 tablet (81 mg total) by mouth once daily.  0    ezetimibe (ZETIA) 10 mg tablet Take 1 tablet (10 mg total) by mouth once daily. 90 tablet 3    ibuprofen (ADVIL,MOTRIN) 800 MG tablet Take 1 tablet (800 mg total) by mouth every 8 (eight) hours as needed for Pain. 24 tablet 0    metoprolol succinate (TOPROL-XL) 50 MG 24 hr tablet Take 1 tablet (50 mg total) by mouth once daily. 90 tablet 3    montelukast (SINGULAIR) 10 mg tablet Take 1 tablet (10 mg total) by mouth once daily. 30 tablet 12    ondansetron (ZOFRAN-ODT) 4 MG TbDL Dissolve 1 tablet (4 mg total) on the tongue every 8 (eight) hours as needed (nausea and/or vomiting). 5 tablet 0    rosuvastatin (CRESTOR) 40 MG Tab Take 1 tablet (40 mg total) by mouth once daily. 90 tablet 0     No current facility-administered medications on file prior to visit.     Social History:     Social History     Tobacco Use    Smoking status: Never     Passive exposure: Never    Smokeless tobacco: Never   Substance Use Topics    Alcohol use: No     Family History:     Family History   Problem Relation Age of Onset    Arthritis Mother         back    Heart disease Father         cad    Hypertension Sister     No Known Problems Daughter     No Known Problems Daughter      Physical Exam:   /86   Pulse 70   Ht 5' 10" (1.778 m)   Wt 86.9 kg (191 lb 9.3 oz)   SpO2 98%   BMI 27.49 kg/m²        Physical Exam  Vitals and nursing note reviewed.   Constitutional:       General: He is not in acute distress.     Appearance: Normal appearance.   HENT:      Head: Normocephalic and atraumatic.      Nose: Nose normal.   Eyes:      General: No scleral icterus.     Extraocular Movements: Extraocular movements intact.      Conjunctiva/sclera: Conjunctivae normal.   Neck:      Thyroid: No thyromegaly.      Vascular: No carotid bruit or JVD.   Cardiovascular:      Rate and Rhythm: Normal rate and regular rhythm.      Heart " "sounds: Normal heart sounds. No murmur heard.     No friction rub. No gallop.   Pulmonary:      Effort: Pulmonary effort is normal.      Breath sounds: Normal breath sounds. No wheezing, rhonchi or rales.   Chest:      Chest wall: No tenderness.   Abdominal:      General: Bowel sounds are normal. There is no distension.      Palpations: Abdomen is soft.      Tenderness: There is no abdominal tenderness.   Musculoskeletal:      Cervical back: Neck supple.      Right lower leg: No edema.      Left lower leg: No edema.   Skin:     General: Skin is warm and dry.      Coloration: Skin is not pale.      Findings: No erythema or rash.      Nails: There is no clubbing.   Neurological:      Mental Status: He is alert and oriented to person, place, and time.   Psychiatric:         Mood and Affect: Mood and affect normal.         Behavior: Behavior normal.          Labs:     Lab Results   Component Value Date     08/28/2023    K 4.5 08/28/2023     08/28/2023    CO2 25 08/28/2023    BUN 14 08/28/2023    CREATININE 1.2 08/28/2023    ANIONGAP 10 08/28/2023     Lab Results   Component Value Date    HGBA1C 5.3 10/06/2017     No results found for: "BNP", "BNPTRIAGEBLO" Lab Results   Component Value Date    WBC 7.10 09/21/2023    HGB 16.5 09/21/2023    HCT 48.4 09/21/2023    HCT 34 (L) 10/06/2017     09/21/2023    GRAN 3.4 09/21/2023    GRAN 48.2 09/21/2023     Lab Results   Component Value Date    CHOL 144 08/28/2023    HDL 43 08/28/2023    LDLCALC 75.4 08/28/2023    TRIG 128 08/28/2023          Imaging:   Echocardiograms:   TTE 10/2/2017    1 - Normal left ventricular systolic function (EF 60-65%).     2 - No wall motion abnormalities.     3 - Normal left ventricular diastolic function.     4 - Normal right ventricular systolic function .     5 - Mild mitral regurgitation.     6 - Mild tricuspid regurgitation.     7 - The estimated PA systolic pressure is 26 mmHg.     Stress Tests:   Exercise EKG 12/20/2019    " The EKG portion of this study is negative for ischemia.    The patient reported no chest pain during the stress test.    There were no arrhythmias during stress.    The exercise capacity was above average (15 METs).    The blood pressure response to stress was normal.    Caths:   [facility] 10/2/2017         Patient has a right dominant coronary artery.        - Left Main Coronary Artery:              The distal LM has a 70% stenosis. There is BENITA 3 flow.      - Left Anterior Descending Artery:              The mid LAD has a 80% stenosis. There is BENITA 3 flow.      - Left Circumflex Artery:              The LCX has luminal irregularities. There is BENITA 3 flow.      - Right Coronary Artery:              The proximal RCA has a 50% stenosis. There is BENITA 3 flow.      - Ramus:              The proximal ramus has a 75% stenosis. There is BENITA 3 flow.      - OM2:              The proximal OM2 has a 50% stenosis. There is BENITA 3 flow.      - Radial:              The radial was not studied.     Other:  Carotid ultrasound 10/3/2017  RIGHT SIDE:   1 - 39 % right ICA stenosis.   Heterogeneous plaque in the right internal carotid artery.   Antegrade flow in the right vertebral artery.   LEFT SIDE:   1 - 39% left ICA stenosis.   Heterogeneous plaque in the left internal carotid artery.   Antegrade flow in the left vertebral artery.        EKG 12/20/2019: normal sinus rhythm, no blocks or conduction defects, no ischemic changes    EKG 1/25/2023: NSR, anterior TWIs (seen on prior EKGs)    Assessment:     1. Coronary artery disease involving native coronary artery of native heart without angina pectoris    2. S/P CABG x 3    3. Mixed hyperlipidemia        Plan:     CAD s/p CABG  Denies anginal chest pain. Has some vague and inconsistent YANEZ, but able to do regimented aerobic exercise >30 min without symptoms.   Continue ASA, statin, beta blocker     Hyperlipidemia   Above goal  Start Zetia 10 mg daily   Continue Crestor 40 mg  daily    Follow up in one year.     Signed:  Lupe Rutledge PA-C  Cardiology   [phone] - General  3/19/2024 10:54 AM

## 2024-04-26 ENCOUNTER — PATIENT MESSAGE (OUTPATIENT)
Dept: INTERNAL MEDICINE | Facility: CLINIC | Age: 54
End: 2024-04-26
Payer: COMMERCIAL

## 2024-04-29 ENCOUNTER — OFFICE VISIT (OUTPATIENT)
Dept: INTERNAL MEDICINE | Facility: CLINIC | Age: 54
End: 2024-04-29
Payer: COMMERCIAL

## 2024-04-29 VITALS
HEIGHT: 70 IN | OXYGEN SATURATION: 98 % | SYSTOLIC BLOOD PRESSURE: 110 MMHG | HEART RATE: 72 BPM | BODY MASS INDEX: 27 KG/M2 | DIASTOLIC BLOOD PRESSURE: 74 MMHG | WEIGHT: 188.63 LBS

## 2024-04-29 DIAGNOSIS — Z95.1 S/P CABG X 3: ICD-10-CM

## 2024-04-29 DIAGNOSIS — I25.10 CORONARY ARTERY DISEASE INVOLVING NATIVE CORONARY ARTERY OF NATIVE HEART WITHOUT ANGINA PECTORIS: ICD-10-CM

## 2024-04-29 DIAGNOSIS — E78.2 MIXED HYPERLIPIDEMIA: ICD-10-CM

## 2024-04-29 DIAGNOSIS — R21 RASH AND NONSPECIFIC SKIN ERUPTION: Primary | ICD-10-CM

## 2024-04-29 PROCEDURE — 99999 PR PBB SHADOW E&M-EST. PATIENT-LVL IV: CPT | Mod: PBBFAC,,, | Performed by: PHYSICIAN ASSISTANT

## 2024-04-29 PROCEDURE — 99213 OFFICE O/P EST LOW 20 MIN: CPT | Mod: S$GLB,,, | Performed by: PHYSICIAN ASSISTANT

## 2024-04-29 RX ORDER — KETOCONAZOLE 20 MG/G
CREAM TOPICAL DAILY
Qty: 60 G | Refills: 1 | Status: SHIPPED | OUTPATIENT
Start: 2024-04-29

## 2024-04-29 RX ORDER — TRIAMCINOLONE ACETONIDE 1 MG/G
OINTMENT TOPICAL 2 TIMES DAILY
Qty: 30 G | Refills: 1 | Status: SHIPPED | OUTPATIENT
Start: 2024-04-29

## 2024-04-30 ENCOUNTER — TELEPHONE (OUTPATIENT)
Dept: INTERNAL MEDICINE | Facility: CLINIC | Age: 54
End: 2024-04-30
Payer: COMMERCIAL

## 2024-04-30 NOTE — PROGRESS NOTES
Subjective:       Patient ID: Fausto Patterson is a 54 y.o. male.        Chief Complaint: Rash    Fausto Patterson is an established patient of Chelsea Callahan MD here today for urgent care visit.    A few months ago he smashed his thumb and the nail broke off.  It has grown back quite jagged and uneven.  Shortly after, he developed a rash at the nail bed.  Now rash has spread down thumb.  It is very pruritic.  Benadryl cream has helped.  OTC hydrocortisone cream burned when applied.      Developed a rash left side of forehead which is almost resolved, also pruritic.    Works as a pharmacist.      BMI 27    CAD, s/p CABG    Lipids           Review of Systems   Constitutional:  Negative for appetite change, chills, fatigue and fever.   HENT:  Negative for congestion and sore throat.    Eyes:  Negative for visual disturbance.   Respiratory:  Negative for cough, chest tightness and shortness of breath.    Cardiovascular:  Negative for chest pain, palpitations and leg swelling.   Gastrointestinal:  Negative for abdominal pain, blood in stool, constipation, diarrhea, nausea and vomiting.   Genitourinary:  Negative for dysuria, frequency, hematuria and urgency.   Musculoskeletal:  Negative for arthralgias and back pain.   Skin:  Positive for rash.   Neurological:  Negative for dizziness, syncope, weakness and headaches.   Psychiatric/Behavioral:  Negative for dysphoric mood and sleep disturbance. The patient is not nervous/anxious.        Objective:      Physical Exam  Constitutional:       General: He is not in acute distress.     Appearance: He is well-developed. He is not diaphoretic.   HENT:      Head: Normocephalic and atraumatic.      Left Ear: External ear normal.   Pulmonary:      Effort: Pulmonary effort is normal.   Abdominal:      Palpations: Abdomen is soft.   Musculoskeletal:      Cervical back: Neck supple.   Skin:     General: Skin is warm and dry.   Neurological:      Mental Status: He is alert.        "          Assessment:       1. Rash and nonspecific skin eruption    2. Mixed hyperlipidemia    3. Coronary artery disease involving native coronary artery of native heart without angina pectoris    4. S/P CABG x 3        Plan:       Fausto was seen today for rash.    Diagnoses and all orders for this visit:    Rash and nonspecific skin eruption - if not resolving will schedule derm appointment to evaluate  -     triamcinolone acetonide 0.1% (KENALOG) 0.1 % ointment; Apply topically 2 (two) times daily.  -     ketoconazole (NIZORAL) 2 % cream; Apply topically once daily.    Mixed hyperlipidemia - stable and controlled, continue current regimen  Lab Results   Component Value Date    CHOL 144 08/28/2023    TRIG 128 08/28/2023    HDL 43 08/28/2023    LDLCALC 75.4 08/28/2023     Coronary artery disease involving native coronary artery of native heart without angina pectoris - stable and controlled, continue current regimen    S/P CABG x 3    Pt has been given instructions populated from patient instructions database and has verbalized understanding of the after visit summary and information contained wherein.    Follow up with a primary care provider. May go to ER for acute shortness of breath, lightheadedness, fever, or any other emergent complaints or changes in condition.    "This note will be shared with the patient"    No future appointments.              "

## 2024-04-30 NOTE — TELEPHONE ENCOUNTER
Please schedule an appointment for patient next Wednesday late in afternoon with Dr. Sofia (derm)  Let patient know complete

## 2024-05-26 DIAGNOSIS — E78.2 MIXED HYPERLIPIDEMIA: ICD-10-CM

## 2024-05-27 RX ORDER — ROSUVASTATIN CALCIUM 40 MG/1
40 TABLET, COATED ORAL DAILY
Qty: 90 TABLET | Refills: 3 | Status: SHIPPED | OUTPATIENT
Start: 2024-05-27

## 2024-06-10 ENCOUNTER — PATIENT MESSAGE (OUTPATIENT)
Dept: INTERNAL MEDICINE | Facility: CLINIC | Age: 54
End: 2024-06-10
Payer: COMMERCIAL

## 2025-03-08 ENCOUNTER — HOSPITAL ENCOUNTER (EMERGENCY)
Facility: HOSPITAL | Age: 55
Discharge: HOME | End: 2025-03-08
Attending: EMERGENCY MEDICINE
Payer: COMMERCIAL

## 2025-03-08 VITALS
DIASTOLIC BLOOD PRESSURE: 69 MMHG | TEMPERATURE: 99 F | HEART RATE: 65 BPM | OXYGEN SATURATION: 96 % | RESPIRATION RATE: 20 BRPM | WEIGHT: 180 LBS | SYSTOLIC BLOOD PRESSURE: 107 MMHG | BODY MASS INDEX: 25.77 KG/M2 | HEIGHT: 70 IN

## 2025-03-08 DIAGNOSIS — R79.89 ELEVATED LACTIC ACID LEVEL: ICD-10-CM

## 2025-03-08 DIAGNOSIS — R07.9 CHEST PAIN: Primary | ICD-10-CM

## 2025-03-08 DIAGNOSIS — J10.1 INFLUENZA A: ICD-10-CM

## 2025-03-08 DIAGNOSIS — R50.9 FEVER: ICD-10-CM

## 2025-03-08 DIAGNOSIS — R06.6 HICCUPS: ICD-10-CM

## 2025-03-08 LAB
ALBUMIN SERPL BCP-MCNC: 3.5 G/DL (ref 3.5–5.2)
ALP SERPL-CCNC: 52 U/L (ref 40–150)
ALT SERPL W/O P-5'-P-CCNC: 22 U/L (ref 10–44)
ANION GAP SERPL CALC-SCNC: 9 MMOL/L (ref 8–16)
AST SERPL-CCNC: 35 U/L (ref 10–40)
BASOPHILS # BLD AUTO: 0.04 K/UL (ref 0–0.2)
BASOPHILS NFR BLD: 1 % (ref 0–1.9)
BILIRUB SERPL-MCNC: 1 MG/DL (ref 0.1–1)
BIPAP: 0
BNP SERPL-MCNC: <10 PG/ML (ref 0–99)
BUN SERPL-MCNC: 14 MG/DL (ref 6–20)
CALCIUM SERPL-MCNC: 8.3 MG/DL (ref 8.7–10.5)
CHLORIDE SERPL-SCNC: 106 MMOL/L (ref 95–110)
CO2 SERPL-SCNC: 21 MMOL/L (ref 23–29)
CREAT SERPL-MCNC: 1.2 MG/DL (ref 0.5–1.4)
DIFFERENTIAL METHOD BLD: ABNORMAL
EOSINOPHIL # BLD AUTO: 0.2 K/UL (ref 0–0.5)
EOSINOPHIL NFR BLD: 5.5 % (ref 0–8)
ERYTHROCYTE [DISTWIDTH] IN BLOOD BY AUTOMATED COUNT: 13.1 % (ref 11.5–14.5)
EST. GFR  (NO RACE VARIABLE): >60 ML/MIN/1.73 M^2
FIO2: 21 %
GLUCOSE SERPL-MCNC: 83 MG/DL (ref 70–110)
HCT VFR BLD AUTO: 49.3 % (ref 40–54)
HCV AB SERPL QL IA: NORMAL
HGB BLD-MCNC: 16.5 G/DL (ref 14–18)
HIV 1+2 AB+HIV1 P24 AG SERPL QL IA: NORMAL
IMM GRANULOCYTES # BLD AUTO: 0.01 K/UL (ref 0–0.04)
IMM GRANULOCYTES NFR BLD AUTO: 0.2 % (ref 0–0.5)
INFLUENZA A, MOLECULAR: POSITIVE
INFLUENZA B, MOLECULAR: NEGATIVE
LACTATE SERPL-SCNC: 2.4 MMOL/L (ref 0.5–2.2)
LDH SERPL L TO P-CCNC: 0.9 MMOL/L (ref 0.5–2.2)
LIPASE SERPL-CCNC: 20 U/L (ref 4–60)
LYMPHOCYTES # BLD AUTO: 0.9 K/UL (ref 1–4.8)
LYMPHOCYTES NFR BLD: 21.5 % (ref 18–48)
MCH RBC QN AUTO: 29.6 PG (ref 27–31)
MCHC RBC AUTO-ENTMCNC: 33.5 G/DL (ref 32–36)
MCV RBC AUTO: 89 FL (ref 82–98)
MONOCYTES # BLD AUTO: 0.9 K/UL (ref 0.3–1)
MONOCYTES NFR BLD: 20.8 % (ref 4–15)
NEUTROPHILS # BLD AUTO: 2.1 K/UL (ref 1.8–7.7)
NEUTROPHILS NFR BLD: 51 % (ref 38–73)
NRBC BLD-RTO: 0 /100 WBC
PLATELET # BLD AUTO: 148 K/UL (ref 150–450)
PMV BLD AUTO: 8.8 FL (ref 9.2–12.9)
POC PERFORMED BY: NORMAL
POC TEMPERATURE: 37 C
POTASSIUM SERPL-SCNC: 3.8 MMOL/L (ref 3.5–5.1)
PROT SERPL-MCNC: 6.3 G/DL (ref 6–8.4)
RBC # BLD AUTO: 5.57 M/UL (ref 4.6–6.2)
SARS-COV-2 RDRP RESP QL NAA+PROBE: NEGATIVE
SODIUM SERPL-SCNC: 136 MMOL/L (ref 136–145)
SPECIMEN SOURCE: ABNORMAL
SPECIMEN SOURCE: NORMAL
TROPONIN I SERPL DL<=0.01 NG/ML-MCNC: <3 NG/L (ref 0–35)
WBC # BLD AUTO: 4.18 K/UL (ref 3.9–12.7)

## 2025-03-08 PROCEDURE — 80053 COMPREHEN METABOLIC PANEL: CPT | Performed by: EMERGENCY MEDICINE

## 2025-03-08 PROCEDURE — 87389 HIV-1 AG W/HIV-1&-2 AB AG IA: CPT | Performed by: PHYSICIAN ASSISTANT

## 2025-03-08 PROCEDURE — 84484 ASSAY OF TROPONIN QUANT: CPT | Performed by: EMERGENCY MEDICINE

## 2025-03-08 PROCEDURE — 96360 HYDRATION IV INFUSION INIT: CPT

## 2025-03-08 PROCEDURE — 93005 ELECTROCARDIOGRAM TRACING: CPT

## 2025-03-08 PROCEDURE — 86803 HEPATITIS C AB TEST: CPT | Performed by: PHYSICIAN ASSISTANT

## 2025-03-08 PROCEDURE — 83690 ASSAY OF LIPASE: CPT | Performed by: EMERGENCY MEDICINE

## 2025-03-08 PROCEDURE — 83605 ASSAY OF LACTIC ACID: CPT | Performed by: PHYSICIAN ASSISTANT

## 2025-03-08 PROCEDURE — 87635 SARS-COV-2 COVID-19 AMP PRB: CPT | Performed by: PHYSICIAN ASSISTANT

## 2025-03-08 PROCEDURE — 25000003 PHARM REV CODE 250: Performed by: PHYSICIAN ASSISTANT

## 2025-03-08 PROCEDURE — 83880 ASSAY OF NATRIURETIC PEPTIDE: CPT | Performed by: PHYSICIAN ASSISTANT

## 2025-03-08 PROCEDURE — 99900035 HC TECH TIME PER 15 MIN (STAT)

## 2025-03-08 PROCEDURE — 93010 ELECTROCARDIOGRAM REPORT: CPT | Mod: ,,, | Performed by: INTERNAL MEDICINE

## 2025-03-08 PROCEDURE — 83605 ASSAY OF LACTIC ACID: CPT

## 2025-03-08 PROCEDURE — 87502 INFLUENZA DNA AMP PROBE: CPT | Performed by: PHYSICIAN ASSISTANT

## 2025-03-08 PROCEDURE — 85025 COMPLETE CBC W/AUTO DIFF WBC: CPT | Performed by: PHYSICIAN ASSISTANT

## 2025-03-08 PROCEDURE — 99285 EMERGENCY DEPT VISIT HI MDM: CPT | Mod: 25

## 2025-03-08 RX ORDER — OSELTAMIVIR PHOSPHATE 75 MG/1
75 CAPSULE ORAL 2 TIMES DAILY
Qty: 10 CAPSULE | Refills: 0 | Status: SHIPPED | OUTPATIENT
Start: 2025-03-08 | End: 2025-03-08

## 2025-03-08 RX ORDER — IBUPROFEN 400 MG/1
800 TABLET ORAL
Status: COMPLETED | OUTPATIENT
Start: 2025-03-08 | End: 2025-03-08

## 2025-03-08 RX ORDER — PROMETHAZINE HYDROCHLORIDE AND DEXTROMETHORPHAN HYDROBROMIDE 6.25; 15 MG/5ML; MG/5ML
SYRUP ORAL
Qty: 60 ML | Refills: 0 | Status: SHIPPED | OUTPATIENT
Start: 2025-03-08 | End: 2025-03-11

## 2025-03-08 RX ORDER — ACETAMINOPHEN 500 MG
1000 TABLET ORAL
Status: COMPLETED | OUTPATIENT
Start: 2025-03-08 | End: 2025-03-08

## 2025-03-08 RX ORDER — OSELTAMIVIR PHOSPHATE 75 MG/1
75 CAPSULE ORAL 2 TIMES DAILY
Qty: 10 CAPSULE | Refills: 0 | Status: SHIPPED | OUTPATIENT
Start: 2025-03-08 | End: 2025-03-13

## 2025-03-08 RX ADMIN — ACETAMINOPHEN 1000 MG: 500 TABLET ORAL at 02:03

## 2025-03-08 RX ADMIN — SODIUM CHLORIDE 1000 ML: 9 INJECTION, SOLUTION INTRAVENOUS at 03:03

## 2025-03-08 RX ADMIN — IBUPROFEN 800 MG: 400 TABLET ORAL at 03:03

## 2025-03-08 NOTE — ED TRIAGE NOTES
Fausto Patterson, a 55 y.o. male presents to the ED w/ complaint of coughing with chest congestion and fever for 3+ days, getting uncontrollable hiccups when coughing fit happens. Took home flu test that was negative.     Triage note:  Chief Complaint   Patient presents with    URI     When coughing , then gets uncontrollable hiccups, hurts to swallow  with white patches, hx cabg    Chest Pain     Review of patient's allergies indicates:   Allergen Reactions    Clarithromycin Other (See Comments)     hiccups    Levaquin [levofloxacin] Hives    Naldecon-ex Hives    Bactrim [sulfamethoxazole-trimethoprim] Rash     Past Medical History:   Diagnosis Date    Allergy     CAD (coronary artery disease)     Hyperlipidemia     Kidney stone     Schatzki's ring     Unspecified disorder of kidney and ureter     calcium oxalate nephrolithiasis    Urticaria      co  
[facility]

## 2025-03-08 NOTE — ED PROVIDER NOTES
Encounter Date: 3/8/2025       History     Chief Complaint   Patient presents with    URI     When coughing , then gets uncontrollable hiccups, hurts to swallow  with white patches, hx cabg    Chest Pain     The patient is a 55 year old male. He has a past medical history of HLD, CAD s/p CABG, Schatzki's ring, urticaria, and kidney stones. He presents to the ER for an emergent evaluation c/o cough, congestion, sore throat, fever, chills, chest pain during cough, and hiccups. Symptoms have been present since yesterday. Degree is moderate,. Course is constant. No additional symptoms or concerns reported.       Review of patient's allergies indicates:   Allergen Reactions    Clarithromycin Other (See Comments)     hiccups    Levaquin [levofloxacin] Hives    Naldecon-ex Hives    Bactrim [sulfamethoxazole-trimethoprim] Rash     Past Medical History:   Diagnosis Date    Allergy     CAD (coronary artery disease)     Hyperlipidemia     Kidney stone     Schatzki's ring     Unspecified disorder of kidney and ureter     calcium oxalate nephrolithiasis    Urticaria      Past Surgical History:   Procedure Laterality Date    CORONARY ARTERY BYPASS GRAFT  10/2017    x3    FESS, WITH NASAL SEPTOPLASTY AND TURBINATE REDUCTION, WITH IMAGING HECTOR Bilateral 11/13/2023    Procedure: FESS, WITH NASAL SEPTOPLASTY AND TURBINATE REDUCTION, WITH IMAGING GUIDANCE;  Surgeon: Willie Keita MD;  Location: [facility];  Service: ENT;  Laterality: Bilateral;  Bilateral Maxillary/ Right Frontal/ Right sphenoid/TIVA/ Stealth/ 180 mins    SINUS SURGERY  2022    TONSILLECTOMY       Family History   Problem Relation Name Age of Onset    Arthritis Mother          back    Heart disease Father          cad    Hypertension Sister      No Known Problems Daughter      No Known Problems Daughter       Social History[1]  Review of Systems   Constitutional:  Positive for chills and fever. Negative for diaphoresis.   HENT:  Positive for congestion, postnasal  drip, rhinorrhea and sore throat. Negative for drooling, ear pain, facial swelling and voice change.    Eyes:  Negative for pain and visual disturbance.   Respiratory:  Positive for cough. Negative for shortness of breath and wheezing.    Cardiovascular:  Positive for chest pain. Negative for palpitations and leg swelling.   Gastrointestinal:  Negative for abdominal distention, abdominal pain, diarrhea, nausea and vomiting.        (+) Hiccups    Genitourinary:  Negative for difficulty urinating.   Musculoskeletal:  Negative for back pain, gait problem and neck pain.   Skin:  Negative for rash.   Allergic/Immunologic: Negative for immunocompromised state.   Neurological:  Negative for dizziness, seizures, syncope, facial asymmetry, speech difficulty, weakness, light-headedness, numbness and headaches.   Hematological:  Negative for adenopathy.   Psychiatric/Behavioral:  Negative for confusion. The patient is nervous/anxious.        Physical Exam     Initial Vitals [03/08/25 1313]   BP Pulse Resp Temp SpO2   (!) 156/80 100 20 (!) 101.4 °F (38.6 °C) 97 %      MAP       --         Physical Exam    Nursing note and vitals reviewed.  Constitutional: He appears well-developed and well-nourished. He is not diaphoretic.   HENT:   Head: Normocephalic. Mouth/Throat: Oropharynx is clear and moist. No oropharyngeal exudate.   Eyes: Conjunctivae are normal.   Neck: Neck supple. No JVD present.   Normal range of motion.  Cardiovascular:            Mild tachycardia    Pulmonary/Chest: No respiratory distress. He has no wheezes.   Occasional cough. No tachypnea or hypoxia. No conversational dyspnea or increased work of breathing.    Abdominal: Abdomen is soft. He exhibits no distension. There is no abdominal tenderness. There is no rebound.   Musculoskeletal:         General: No tenderness or edema. Normal range of motion.      Cervical back: Normal range of motion and neck supple.     Lymphadenopathy:     He has no cervical  adenopathy.   Neurological: He is alert and oriented to person, place, and time. He has normal strength. No sensory deficit. GCS score is 15. GCS eye subscore is 4. GCS verbal subscore is 5. GCS motor subscore is 6.   Skin: Skin is warm and dry.   Psychiatric:   Nervous/anxious          ED Course   Procedures  Labs Reviewed   INFLUENZA A & B BY MOLECULAR - Abnormal       Result Value    Influenza A, Molecular Positive (*)     Influenza B, Molecular Negative      Flu A & B Source Nasal swab     CBC W/ AUTO DIFFERENTIAL - Abnormal    WBC 4.18      RBC 5.57      Hemoglobin 16.5      Hematocrit 49.3      MCV 89      MCH 29.6      MCHC 33.5      RDW 13.1      Platelets 148 (*)     MPV 8.8 (*)     Immature Granulocytes 0.2      Gran # (ANC) 2.1      Immature Grans (Abs) 0.01      Lymph # 0.9 (*)     Mono # 0.9      Eos # 0.2      Baso # 0.04      nRBC 0      Gran % 51.0      Lymph % 21.5      Mono % 20.8 (*)     Eosinophil % 5.5      Basophil % 1.0      Differential Method Automated     LACTIC ACID, PLASMA - Abnormal    Lactate (Lactic Acid) 2.4 (*)    COMPREHENSIVE METABOLIC PANEL - Abnormal    Sodium 136      Potassium 3.8      Chloride 106      CO2 21 (*)     Glucose 83      BUN 14      Creatinine 1.2      Calcium 8.3 (*)     Total Protein 6.3      Albumin 3.5      Total Bilirubin 1.0      Alkaline Phosphatase 52      AST 35      ALT 22      eGFR >60.0      Anion Gap 9     HEPATITIS C ANTIBODY    Hepatitis C Ab Non-reactive      Narrative:     Release to patient->Immediate   HIV 1 / 2 ANTIBODY    HIV 1/2 Ag/Ab Non-reactive      Narrative:     Release to patient->Immediate   SARS-COV-2 RNA AMPLIFICATION, QUAL    SARS-CoV-2 RNA, Amplification, Qual Negative     B-TYPE NATRIURETIC PEPTIDE    BNP <10     LIPASE    Lipase 20     TROPONIN I HIGH SENSITIVITY    Troponin I High Sensitivity <3          ECG Results              EKG 12-lead (Final result)        Collection Time Result Time QRS Duration [facility] QTC Calculation     03/08/25 13:31:28 03/09/25 08:25:56 72 392                     Final result by Interface, Lab In [facility] (03/09/25 08:26:00)                   Narrative:    Test Reason : R07.9,    Vent. Rate :  95 BPM     Atrial Rate :  95 BPM     P-R Int : 168 ms          QRS Dur :  72 ms      QT Int : 312 ms       P-R-T Axes :  61  42  62 degrees    QTcB Int : 392 ms    Normal sinus rhythm  Low septal forces  Nonspecific ST and/or T wave abnormalities  Abnormal ECG  When compared with ECG of 25-Jan-2023 15:01,  No significant change was found  Confirmed by Chico Clark ([id number]) on 3/9/2025 8:25:55 AM    Referred By: AAAREFERRAL SELF           Confirmed By: Chico Clark                                  Imaging Results              X-Ray Chest PA And Lateral (Final result)  Result time 03/08/25 14:41:25      Final result by Hleadio Obregon MD (03/08/25 14:41:25)                   Impression:      No convincing evidence of acute cardiopulmonary disease.      Electronically signed by: Heladio Obregon  Date:    03/08/2025  Time:    14:41               Narrative:    EXAMINATION:  XR CHEST PA AND LATERAL    CLINICAL HISTORY:  Fever, unspecified    TECHNIQUE:  PA and lateral views of the chest were performed.    COMPARISON:  Chest radiograph performed 11/06/2017.    FINDINGS:  Monitoring leads overlie the chest.  Status post median sternotomy, presumed for CABG.  Lungs grossly clear.  No definite pneumothorax or large volume pleural effusion.    No acute findings in the visualized abdomen.  Osseous and soft tissue structures appear without definite acute change.                                    Vitals:    03/08/25 1503 03/08/25 1525 03/08/25 1603 03/08/25 1703   BP: 112/71  106/68 107/69   Pulse: 76  66 65   Resp: 20  18 20   Temp:  99 °F (37.2 °C)     TempSrc:       SpO2: 96%  99% 96%   Weight:       Height:              Medications   acetaminophen tablet 1,000 mg (1,000 mg Oral Given 3/8/25 1403)   sodium chloride 0.9% bolus  1,000 mL 1,000 mL (0 mLs Intravenous Stopped 3/8/25 1644)   ibuprofen tablet 800 mg (800 mg Oral Given 3/8/25 1525)     Medical Decision Making  Amount and/or Complexity of Data Reviewed  Labs: ordered.  Radiology: ordered.    Risk  OTC drugs.  Prescription drug management.      Additional MDM:     EKG: I have independently interpreted EKG(s) - see notes., EKG - Independent Interpretation - Normal sinus rhythm, normal rate, no acute changes.     X-Rays: I have independently interpreted X-Ray(s) - see notes., Chest X-Ray - Independent Interpretation - Heart size normal, Lungs clear, No acute abnormality.                      Medical Decision Making:   History:   I obtained history from: someone other than patient.       <> Summary of History: History obtained from patient and his wife   Old Medical Records: I decided to obtain old medical records.  Initial Assessment:   54 yo M, hx of CAD/CABG, presenting to ED c/o acute flu-like symptoms, chest pain during cough, and hiccups x 2 days     Differential Diagnosis:   Covid, Influenza, pneumonia, URI, viral syndrome, sepsis, pharyngitis, bronchitis, sinusitis, pleurisy, costochondritis, ACS, etc   Clinical Tests:   Lab Tests: Ordered and Reviewed  Radiological Study: Ordered and Reviewed  Medical Tests: Ordered and Reviewed  ED Management:  Vital signs reviewed - Febrile on arrival temp of 101.4, HR elevated at 100 bpm, mildly hypertensive, no tachypnea or hypoxia.   Chart review completed   History and presentation consistent with respiratory infection, however, cardiac work up indicated due to c/p atypical chest pain in setting of pt with known CAD   EKG completed - no STEMI or acute ischemic changes - reviewed and signed by the ER attending physician   Influenza A positive  Chest x ray unremarkable   Labs reviewed - mildly elevated lactate of 2.4   Patient given Ibuprofen and IV fluids   Fever and HR improved   I liter bolus given, repeat POCT Lactate in normal range    Patient reports feeling better after treatment in ED   Patient instructed to rest, hydrate and to take Tylenol and Ibuprofen as directed as needed for fever and aches   Close follow up with PCP advised   Return precautions provided     Other:   I have discussed this case with another health care provider.             Clinical Impression:  Final diagnoses:  [R07.9] Chest pain (Primary)  [R50.9] Fever  [J10.1] Influenza A  [R79.89] Elevated lactic acid level  [R06.6] Hiccups          ED Disposition Condition    Discharge Stable          ED Prescriptions       Medication Sig Dispense Start Date End Date Auth. Provider    oseltamivir (TAMIFLU) 75 MG capsule  (Status: Discontinued) Take 1 capsule (75 mg total) by mouth 2 (two) times daily. for 5 days 10 capsule 3/8/2025 3/8/2025 Carina Solano PA-C    promethazine-dextromethorphan (PROMETHAZINE-DM) 6.25-15 mg/5 mL Syrp Take 5 ml by mouth every 4 to 6 hours as needed for flu symptoms 60 mL 3/8/2025 -- Carina Solano PA-C    oseltamivir (TAMIFLU) 75 MG capsule Take 1 capsule (75 mg total) by mouth 2 (two) times daily. for 5 days 10 capsule 3/8/2025 3/13/2025 Carina Solano PA-C          Follow-up Information       Follow up With Specialties Details Why Contact Info    Chelsea Callahan MD Internal Medicine Schedule an appointment as soon as possible for a visit  Rest, hydrate, take Tylenol as directed as needed. Follow up with primary care in the next 1-2 days for re-evaluation and further management. [address]  [facility] [address]  [phone]      [facility] - Emergency Dept Emergency Medicine  Return to the ER if worse in any way. [address]  [facility] [id number] [phone]                 [1]   Social History  Tobacco Use    Smoking status: Never     Passive exposure: Never    Smokeless tobacco: Never   Substance Use Topics    Alcohol use: No    Drug use: No        Carina Solano PA-C  03/11/25 0014

## 2025-03-09 LAB
OHS QRS DURATION: 72 MS
OHS QTC CALCULATION: 392 MS

## 2025-03-11 ENCOUNTER — OFFICE VISIT (OUTPATIENT)
Dept: INTERNAL MEDICINE | Facility: CLINIC | Age: 55
End: 2025-03-11
Payer: COMMERCIAL

## 2025-03-11 ENCOUNTER — LAB VISIT (OUTPATIENT)
Dept: LAB | Facility: HOSPITAL | Age: 55
End: 2025-03-11
Attending: INTERNAL MEDICINE
Payer: COMMERCIAL

## 2025-03-11 VITALS
DIASTOLIC BLOOD PRESSURE: 80 MMHG | HEIGHT: 70 IN | HEART RATE: 83 BPM | SYSTOLIC BLOOD PRESSURE: 110 MMHG | OXYGEN SATURATION: 98 % | WEIGHT: 193.81 LBS | BODY MASS INDEX: 27.75 KG/M2

## 2025-03-11 DIAGNOSIS — I25.810 CORONARY ARTERY DISEASE INVOLVING CORONARY BYPASS GRAFT OF NATIVE HEART WITHOUT ANGINA PECTORIS: ICD-10-CM

## 2025-03-11 DIAGNOSIS — Z00.00 ANNUAL PHYSICAL EXAM: ICD-10-CM

## 2025-03-11 DIAGNOSIS — E78.2 MIXED HYPERLIPIDEMIA: ICD-10-CM

## 2025-03-11 DIAGNOSIS — Z00.00 ANNUAL PHYSICAL EXAM: Primary | ICD-10-CM

## 2025-03-11 DIAGNOSIS — Z95.1 S/P CABG X 3: ICD-10-CM

## 2025-03-11 LAB
BASOPHILS # BLD AUTO: 0.02 K/UL (ref 0–0.2)
BASOPHILS NFR BLD: 0.6 % (ref 0–1.9)
CHOLEST SERPL-MCNC: 102 MG/DL (ref 120–199)
CHOLEST/HDLC SERPL: 3.3 {RATIO} (ref 2–5)
DIFFERENTIAL METHOD BLD: ABNORMAL
EOSINOPHIL # BLD AUTO: 0.3 K/UL (ref 0–0.5)
EOSINOPHIL NFR BLD: 7.6 % (ref 0–8)
ERYTHROCYTE [DISTWIDTH] IN BLOOD BY AUTOMATED COUNT: 13 % (ref 11.5–14.5)
ESTIMATED AVG GLUCOSE: 120 MG/DL (ref 68–131)
HBA1C MFR BLD: 5.8 % (ref 4–5.6)
HCT VFR BLD AUTO: 51.5 % (ref 40–54)
HDLC SERPL-MCNC: 31 MG/DL (ref 40–75)
HDLC SERPL: 30.4 % (ref 20–50)
HGB BLD-MCNC: 16.7 G/DL (ref 14–18)
IMM GRANULOCYTES # BLD AUTO: 0.01 K/UL (ref 0–0.04)
IMM GRANULOCYTES NFR BLD AUTO: 0.3 % (ref 0–0.5)
LDLC SERPL CALC-MCNC: 49.2 MG/DL (ref 63–159)
LYMPHOCYTES # BLD AUTO: 1.2 K/UL (ref 1–4.8)
LYMPHOCYTES NFR BLD: 36.1 % (ref 18–48)
MCH RBC QN AUTO: 28.8 PG (ref 27–31)
MCHC RBC AUTO-ENTMCNC: 32.4 G/DL (ref 32–36)
MCV RBC AUTO: 89 FL (ref 82–98)
MONOCYTES # BLD AUTO: 0.6 K/UL (ref 0.3–1)
MONOCYTES NFR BLD: 16.7 % (ref 4–15)
NEUTROPHILS # BLD AUTO: 1.3 K/UL (ref 1.8–7.7)
NEUTROPHILS NFR BLD: 38.7 % (ref 38–73)
NONHDLC SERPL-MCNC: 71 MG/DL
NRBC BLD-RTO: 0 /100 WBC
PLATELET # BLD AUTO: 157 K/UL (ref 150–450)
PMV BLD AUTO: 9.6 FL (ref 9.2–12.9)
RBC # BLD AUTO: 5.79 M/UL (ref 4.6–6.2)
TRIGL SERPL-MCNC: 109 MG/DL (ref 30–150)
WBC # BLD AUTO: 3.3 K/UL (ref 3.9–12.7)

## 2025-03-11 PROCEDURE — 99396 PREV VISIT EST AGE 40-64: CPT | Mod: S$GLB,,, | Performed by: INTERNAL MEDICINE

## 2025-03-11 PROCEDURE — 3079F DIAST BP 80-89 MM HG: CPT | Mod: CPTII,S$GLB,, | Performed by: INTERNAL MEDICINE

## 2025-03-11 PROCEDURE — 1159F MED LIST DOCD IN RCRD: CPT | Mod: CPTII,S$GLB,, | Performed by: INTERNAL MEDICINE

## 2025-03-11 PROCEDURE — 3074F SYST BP LT 130 MM HG: CPT | Mod: CPTII,S$GLB,, | Performed by: INTERNAL MEDICINE

## 2025-03-11 PROCEDURE — 85025 COMPLETE CBC W/AUTO DIFF WBC: CPT | Performed by: INTERNAL MEDICINE

## 2025-03-11 PROCEDURE — 80061 LIPID PANEL: CPT | Performed by: INTERNAL MEDICINE

## 2025-03-11 PROCEDURE — 83036 HEMOGLOBIN GLYCOSYLATED A1C: CPT | Performed by: INTERNAL MEDICINE

## 2025-03-11 PROCEDURE — 3008F BODY MASS INDEX DOCD: CPT | Mod: CPTII,S$GLB,, | Performed by: INTERNAL MEDICINE

## 2025-03-11 PROCEDURE — 99999 PR PBB SHADOW E&M-EST. PATIENT-LVL III: CPT | Mod: PBBFAC,,, | Performed by: INTERNAL MEDICINE

## 2025-03-11 RX ORDER — EZETIMIBE 10 MG/1
10 TABLET ORAL DAILY
Qty: 90 TABLET | Refills: 3 | Status: SHIPPED | OUTPATIENT
Start: 2025-03-11 | End: 2026-03-11

## 2025-03-11 RX ORDER — ROSUVASTATIN CALCIUM 40 MG/1
40 TABLET, COATED ORAL DAILY
Qty: 90 TABLET | Refills: 3 | Status: SHIPPED | OUTPATIENT
Start: 2025-03-11

## 2025-03-11 RX ORDER — METOPROLOL SUCCINATE 50 MG/1
50 TABLET, EXTENDED RELEASE ORAL DAILY
Qty: 90 TABLET | Refills: 3 | Status: SHIPPED | OUTPATIENT
Start: 2025-03-11 | End: 2026-03-06

## 2025-03-11 NOTE — PROGRESS NOTES
Subjective     Patient ID: Fausto Patterson is a 55 y.o. male.    Chief Complaint: Annual Exam    HPI  Recent ED visit for cough induced hiccups.     He had Flu A, which began 5 days ago.     Hiccups resolved with IVF.  He also had pain LUQ, which has resolved.    S/p cabg.  No angina.  Taking metoprolol.     Exercises 3-4 days on elliptical or treadmill.     Last cardiology note 3/23 reviewed with pt.      He has started zetia.  Also taking rosuvastatin 40 mg         Turbinate surgery 11/23, with subsequent improvement of chronic sinusitis.      H/o schatzki's ring.     Occas dysphagia    No renal colic.    Trauma R thumb.  Aplying nizoral cream.  Review of Systems   Constitutional:  Negative for activity change and unexpected weight change.   HENT:  Negative for postnasal drip, rhinorrhea and sinus pressure/congestion.    Respiratory:  Negative for chest tightness and shortness of breath.    Cardiovascular:  Negative for chest pain and leg swelling.   Gastrointestinal:  Negative for abdominal pain, nausea and vomiting.   Genitourinary:  Negative for difficulty urinating, dysuria, hematuria and urgency.   Integumentary:  Negative for rash.   Neurological:  Negative for headaches.   Psychiatric/Behavioral:  Negative for dysphoric mood and sleep disturbance. The patient is not nervous/anxious.           Objective     Physical Exam  Constitutional:       General: He is not in acute distress.     Appearance: He is well-developed. He is not ill-appearing, toxic-appearing or diaphoretic.   Eyes:      General: No scleral icterus.  Neck:      Thyroid: No thyromegaly.      Vascular: No JVD.   Cardiovascular:      Rate and Rhythm: Normal rate and regular rhythm.      Heart sounds: Normal heart sounds. No murmur heard.  Pulmonary:      Effort: Pulmonary effort is normal. No respiratory distress.      Breath sounds: Normal breath sounds. No stridor. No wheezing, rhonchi or rales.   Abdominal:      General: There is no distension.       Palpations: Abdomen is soft. There is no mass.      Tenderness: There is no abdominal tenderness. There is no guarding.      Hernia: No hernia is present.   Musculoskeletal:      Cervical back: No rigidity or tenderness.      Right lower leg: No edema.      Left lower leg: No edema.   Lymphadenopathy:      Cervical: No cervical adenopathy.   Neurological:      Mental Status: He is alert and oriented to person, place, and time.   Psychiatric:         Mood and Affect: Mood normal.         Behavior: Behavior normal.         Thought Content: Thought content normal.       Results for orders placed or performed during the hospital encounter of 03/08/25   EKG 12-lead    Collection Time: 03/08/25  1:31 PM   Result Value Ref Range    QRS Duration 72 ms    OHS QTC Calculation 392 ms   Influenza A & B by Molecular    Collection Time: 03/08/25  1:51 PM    Specimen: Nasopharyngeal Swab   Result Value Ref Range    Influenza A, Molecular Positive (A) Negative    Influenza B, Molecular Negative Negative    Flu A & B Source Nasal swab    COVID-19 Rapid Screening    Collection Time: 03/08/25  1:51 PM   Result Value Ref Range    SARS-CoV-2 RNA, Amplification, Qual Negative Negative   Hepatitis C Antibody    Collection Time: 03/08/25  2:17 PM   Result Value Ref Range    Hepatitis C Ab Non-reactive Non-reactive   HIV 1/2 Ag/Ab (4th Gen)    Collection Time: 03/08/25  2:17 PM   Result Value Ref Range    HIV 1/2 Ag/Ab Non-reactive Non-reactive   CBC auto differential    Collection Time: 03/08/25  2:17 PM   Result Value Ref Range    WBC 4.18 3.90 - 12.70 K/uL    RBC 5.57 4.60 - 6.20 M/uL    Hemoglobin 16.5 14.0 - 18.0 g/dL    Hematocrit 49.3 40.0 - 54.0 %    MCV 89 82 - 98 fL    MCH 29.6 27.0 - 31.0 pg    MCHC 33.5 32.0 - 36.0 g/dL    RDW 13.1 11.5 - 14.5 %    Platelets 148 (L) 150 - 450 K/uL    MPV 8.8 (L) 9.2 - 12.9 fL    Immature Granulocytes 0.2 0.0 - 0.5 %    Gran # (ANC) 2.1 1.8 - 7.7 K/uL    Immature Grans (Abs) 0.01 0.00 - 0.04  K/uL    Lymph # 0.9 (L) 1.0 - 4.8 K/uL    Mono # 0.9 0.3 - 1.0 K/uL    Eos # 0.2 0.0 - 0.5 K/uL    Baso # 0.04 0.00 - 0.20 K/uL    nRBC 0 0 /100 WBC    Gran % 51.0 38.0 - 73.0 %    Lymph % 21.5 18.0 - 48.0 %    Mono % 20.8 (H) 4.0 - 15.0 %    Eosinophil % 5.5 0.0 - 8.0 %    Basophil % 1.0 0.0 - 1.9 %    Differential Method Automated    Brain natriuretic peptide    Collection Time: 03/08/25  2:17 PM   Result Value Ref Range    BNP <10 0 - 99 pg/mL   Lactic acid, plasma    Collection Time: 03/08/25  2:17 PM   Result Value Ref Range    Lactate (Lactic Acid) 2.4 (H) 0.5 - 2.2 mmol/L   Comprehensive metabolic panel    Collection Time: 03/08/25  4:11 PM   Result Value Ref Range    Sodium 136 136 - 145 mmol/L    Potassium 3.8 3.5 - 5.1 mmol/L    Chloride 106 95 - 110 mmol/L    CO2 21 (L) 23 - 29 mmol/L    Glucose 83 70 - 110 mg/dL    BUN 14 6 - 20 mg/dL    Creatinine 1.2 0.5 - 1.4 mg/dL    Calcium 8.3 (L) 8.7 - 10.5 mg/dL    Total Protein 6.3 6.0 - 8.4 g/dL    Albumin 3.5 3.5 - 5.2 g/dL    Total Bilirubin 1.0 0.1 - 1.0 mg/dL    Alkaline Phosphatase 52 40 - 150 U/L    AST 35 10 - 40 U/L    ALT 22 10 - 44 U/L    eGFR >60.0 >60 mL/min/1.73 m^2    Anion Gap 9 8 - 16 mmol/L   Lipase    Collection Time: 03/08/25  4:11 PM   Result Value Ref Range    Lipase 20 4 - 60 U/L   Troponin I High Sensitivity    Collection Time: 03/08/25  4:11 PM   Result Value Ref Range    Troponin I High Sensitivity <3 0 - 35 ng/L   POCT Capillary Blood Gas-Resp    Collection Time: 03/08/25  4:23 PM   Result Value Ref Range    POC Lactate 0.9 0.5 - 2.2 mmol/L    POC Temp 37.0 C    Specimen source Venous     Performed By: ANA     FiO2 21.0 %    BIPAP 0          Assessment and Plan     1. Annual physical exam  -     Lipid Panel; Future; Expected date: 03/11/2025  -     CBC Auto Differential; Future; Expected date: 06/11/2025  -     Hemoglobin A1C; Future; Expected date: 06/09/2025    2. S/P CABG x 3  -     ezetimibe (ZETIA) 10 mg tablet; Take 1  tablet (10 mg total) by mouth once daily.  Dispense: 90 tablet; Refill: 3    3. Mixed hyperlipidemia  -     rosuvastatin (CRESTOR) 40 MG Tab; Take 1 tablet (40 mg total) by mouth once daily.  Dispense: 90 tablet; Refill: 3    4. Coronary artery disease involving coronary bypass graft of native heart without angina pectoris  -     metoprolol succinate (TOPROL-XL) 50 MG 24 hr tablet; Take 1 tablet (50 mg total) by mouth once daily.  Dispense: 90 tablet; Refill: 3        Exercise regularly.  Weight loss diet reviewed.          Follow up in about 1 year (around 3/11/2026) for PE.

## 2025-03-15 ENCOUNTER — RESULTS FOLLOW-UP (OUTPATIENT)
Dept: INTERNAL MEDICINE | Facility: CLINIC | Age: 55
End: 2025-03-15

## 2025-04-15 ENCOUNTER — OFFICE VISIT (OUTPATIENT)
Dept: OTOLARYNGOLOGY | Facility: CLINIC | Age: 55
End: 2025-04-15
Payer: COMMERCIAL

## 2025-04-15 VITALS
SYSTOLIC BLOOD PRESSURE: 126 MMHG | WEIGHT: 199.75 LBS | BODY MASS INDEX: 28.66 KG/M2 | DIASTOLIC BLOOD PRESSURE: 85 MMHG | HEART RATE: 66 BPM

## 2025-04-15 DIAGNOSIS — J32.4 CHRONIC PANSINUSITIS: Primary | ICD-10-CM

## 2025-04-15 DIAGNOSIS — J30.2 PERENNIAL ALLERGIC RHINITIS WITH SEASONAL VARIATION: ICD-10-CM

## 2025-04-15 DIAGNOSIS — J30.89 PERENNIAL ALLERGIC RHINITIS WITH SEASONAL VARIATION: ICD-10-CM

## 2025-04-15 DIAGNOSIS — J33.8 OTHER POLYP OF SINUS: ICD-10-CM

## 2025-04-15 PROCEDURE — 3044F HG A1C LEVEL LT 7.0%: CPT | Mod: CPTII,S$GLB,, | Performed by: PHYSICIAN ASSISTANT

## 2025-04-15 PROCEDURE — 3008F BODY MASS INDEX DOCD: CPT | Mod: CPTII,S$GLB,, | Performed by: PHYSICIAN ASSISTANT

## 2025-04-15 PROCEDURE — 87075 CULTR BACTERIA EXCEPT BLOOD: CPT | Performed by: PHYSICIAN ASSISTANT

## 2025-04-15 PROCEDURE — 87102 FUNGUS ISOLATION CULTURE: CPT | Performed by: PHYSICIAN ASSISTANT

## 2025-04-15 PROCEDURE — 99214 OFFICE O/P EST MOD 30 MIN: CPT | Mod: 25,S$GLB,, | Performed by: PHYSICIAN ASSISTANT

## 2025-04-15 PROCEDURE — 87186 SC STD MICRODIL/AGAR DIL: CPT | Performed by: PHYSICIAN ASSISTANT

## 2025-04-15 PROCEDURE — 3074F SYST BP LT 130 MM HG: CPT | Mod: CPTII,S$GLB,, | Performed by: PHYSICIAN ASSISTANT

## 2025-04-15 PROCEDURE — 1159F MED LIST DOCD IN RCRD: CPT | Mod: CPTII,S$GLB,, | Performed by: PHYSICIAN ASSISTANT

## 2025-04-15 PROCEDURE — 99999 PR PBB SHADOW E&M-EST. PATIENT-LVL III: CPT | Mod: PBBFAC,,, | Performed by: PHYSICIAN ASSISTANT

## 2025-04-15 PROCEDURE — 3079F DIAST BP 80-89 MM HG: CPT | Mod: CPTII,S$GLB,, | Performed by: PHYSICIAN ASSISTANT

## 2025-04-15 RX ORDER — SULFAMETHOXAZOLE AND TRIMETHOPRIM 800; 160 MG/1; MG/1
1 TABLET ORAL 2 TIMES DAILY
Qty: 20 TABLET | Refills: 0 | Status: SHIPPED | OUTPATIENT
Start: 2025-04-15 | End: 2025-04-25

## 2025-04-15 NOTE — PROGRESS NOTES
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  His last visit with me was on 3/4/2024.      Reports developing flu about 1 month ago with resolution of symptoms after 1 week then developing bilateral nasal congestion with discolored nasal discharge about 2 weeks ago.  Some facial pressure at times.  Also with a malodor more frequently.  Patient has been using his saline rinses but denies any nasal spray usage.    The patient's medications, allergies, past medical, surgical, social and family histories were reviewed and updated as appropriate.    A detailed review of systems was obtained with pertinent positives as per the above HPI, and otherwise negative.        Objective:     /85 (BP Location: Right arm, Patient Position: Sitting)   Pulse 66   Wt 90.6 kg (199 lb 11.8 oz)   BMI 28.66 kg/m²      Physical Exam  Vitals reviewed.   Constitutional:       Appearance: Normal appearance.   HENT:      Head: Normocephalic and atraumatic.      Right Ear: External ear normal.      Left Ear: External ear normal.      Nose: No nasal deformity or septal deviation.      Right Turbinates: Not enlarged or swollen.      Left Turbinates: Not enlarged or swollen.      Right Sinus: Frontal sinus tenderness present. No maxillary sinus tenderness.      Left Sinus: No maxillary sinus tenderness or frontal sinus tenderness.   Eyes:      Conjunctiva/sclera: Conjunctivae normal.   Pulmonary:      Effort: Pulmonary effort is normal.   Neurological:      Mental Status: He is alert.          Procedure    Procedure: Rigid endoscopic nasal exam   Surgeon: Ramón Rubio PA-C  Procedure note/findings: After informed discussion of the risks, benefits, and alternatives after indications as noted above, nasal cavities were topically decongested with phenylephrine 1% solution. A rigid 0 degree nasal endoscope was inserted into bilateral nasal cavities. The following features were examined: nasal  valves, inferior turbinates and inferior meatuses, nasal septum, middle turbinates and middle meatuses, sphenoethmoid recesses, and nasopharynx (including Fossa of Rosenmueller and eustachian tube orifices).     The scope was then withdrawn and removed.  The patient tolerated the procedure well without complication. All findings were normal except:  Surgical changes bilaterally  Yellow/green crusting and mucopurulence on left (cultured)  Small polyp on left with edematous L MT     L max     L MT and small polyp against septum        Data Reviewed    WBC (K/uL)   Date Value   03/11/2025 3.30 (L)     Eosinophil % (%)   Date Value   03/11/2025 7.6     Eos # (K/uL)   Date Value   03/11/2025 0.3     Platelets (K/uL)   Date Value   03/11/2025 157     Glucose (mg/dL)   Date Value   03/08/2025 83     Total IgE (IU/mL)   Date Value   09/21/2023 2043 (H)     Pathology report indicated chronic inflammation with eosinophilia.  Cultures showed +x maltophilia, +klebsiella +fungus (rare). Started on Bactrim 11/16/23 and completed 7.5 days of treatment (reaction of itching)    Assessment:     1. Chronic pansinusitis    2. Other polyp of sinus    3. Perennial allergic rhinitis with seasonal variation         Plan:     Start empiric Bactrim.  Patient with multiple antibiotic allergies with only mild itching/rash as a side effect from Bactrim.  Discussed starting Zyrtec daily while on Bactrim and if any significant reactions I will have him stop.  I swabbed his sinus exudate for culture and will use the results to direct antibiotic therapy as indicated.  START budesonide rinses.  He has not been on any consistent topical nasal steroids following surgery.  Small polyp since on nasal endoscopy.    He will f/u in 3 months for nasal endoscopy  I had a discussion with the patient regarding his condition and the further workup and management options.    All questions were answered, and the patient is in agreement with the above.      Disclaimer:  This note may have been prepared utilizing voice recognition software which may result in occasional typographical errors in the text such as sound alike words.   If further clarification is needed, please contact the ENT department of [facility].

## 2025-04-17 LAB — BACTERIA SPEC ANAEROBE CULT: NORMAL

## 2025-04-18 LAB
BACTERIA SPEC AEROBE CULT: ABNORMAL
BACTERIA SPEC AEROBE CULT: ABNORMAL

## 2025-04-21 ENCOUNTER — PATIENT MESSAGE (OUTPATIENT)
Dept: OTOLARYNGOLOGY | Facility: CLINIC | Age: 55
End: 2025-04-21
Payer: COMMERCIAL

## 2025-04-21 ENCOUNTER — RESULTS FOLLOW-UP (OUTPATIENT)
Dept: OTOLARYNGOLOGY | Facility: CLINIC | Age: 55
End: 2025-04-21

## 2025-05-19 ENCOUNTER — TELEPHONE (OUTPATIENT)
Dept: OTOLARYNGOLOGY | Facility: CLINIC | Age: 55
End: 2025-05-19
Payer: COMMERCIAL

## 2025-05-19 NOTE — TELEPHONE ENCOUNTER
Called patient regarding an appointment with JAMIN Rubio, no answer. Left a message regarding need to reschedule appointment. Provider will be out that day. AM

## 2025-07-18 ENCOUNTER — OFFICE VISIT (OUTPATIENT)
Dept: OTOLARYNGOLOGY | Facility: CLINIC | Age: 55
End: 2025-07-18
Payer: COMMERCIAL

## 2025-07-18 VITALS
BODY MASS INDEX: 28.41 KG/M2 | HEART RATE: 63 BPM | WEIGHT: 198 LBS | SYSTOLIC BLOOD PRESSURE: 115 MMHG | DIASTOLIC BLOOD PRESSURE: 80 MMHG

## 2025-07-18 DIAGNOSIS — J32.8 OTHER CHRONIC SINUSITIS: Primary | ICD-10-CM

## 2025-07-18 DIAGNOSIS — J30.2 PERENNIAL ALLERGIC RHINITIS WITH SEASONAL VARIATION: ICD-10-CM

## 2025-07-18 DIAGNOSIS — J33.9 NASAL POLYPOSIS: ICD-10-CM

## 2025-07-18 DIAGNOSIS — J30.89 PERENNIAL ALLERGIC RHINITIS WITH SEASONAL VARIATION: ICD-10-CM

## 2025-07-18 PROCEDURE — 99999 PR PBB SHADOW E&M-EST. PATIENT-LVL III: CPT | Mod: PBBFAC,,, | Performed by: PHYSICIAN ASSISTANT

## 2025-07-18 PROCEDURE — 87075 CULTR BACTERIA EXCEPT BLOOD: CPT | Performed by: PHYSICIAN ASSISTANT

## 2025-07-18 PROCEDURE — 87070 CULTURE OTHR SPECIMN AEROBIC: CPT | Performed by: PHYSICIAN ASSISTANT

## 2025-07-18 NOTE — PROCEDURES
Nasal/sinus endoscopy    Date/Time: 7/18/2025 8:30 AM    Performed by: Ramón Rubio PA-C  Authorized by: Ramón Rubio PA-C    Consent Done?:  Yes (Verbal)  Anesthesia:     Local anesthetic:  Phenylephrine spray and lidocaine 4%    Location: Bilateral.    Endoscope type: Rigid.    Patient tolerance:  Patient tolerated the procedure well with no immediate complications  Nose:     Procedure Performed:  Removal of Debridement  External:      No external nasal deformity  Intranasal:      Mucosa polyps     Mucosa ulcers not present     No mucosa lesions present     Turbinates not enlarged     No septum gross deformity  Nasopharynx:      No mucosa lesions     Adenoids not present     Posterior choanae patent     Eustachian tube patent     Small left polyp similar to prior scope   Polypoid edema near turbinate axilla  Yellow/green crusting against R MT; removed and sent for culture

## 2025-07-18 NOTE — PROGRESS NOTES
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  His last visit with me was on 4/15/2025.      SINUS INFECTION:  He reports recurrence of right-sided sinus infection symptoms in the last two weeks after discontinuing Zyrtec while traveling 3 weeks ago. Current symptoms include nasal drainage with small yellow mucus clumps during nasal wash and decreased sense of smell over the past week.  No significant facial pressure when sleeping on his right side.    PREVIOUS TREATMENT:  Previous treatment with Bactrim successfully cleared the infection. Budesonide nasal wash helped with drainage, and Zyrtec was effective in preventing rash development while taking the Bactrim.    CURRENT TREATMENT:  He continues using nasal wash with Budesonide twice daily.      Per last office visit 4/15/2025 :  Reports developing flu about 1 month ago with resolution of symptoms after 1 week then developing bilateral nasal congestion with discolored nasal discharge about 2 weeks ago.  Some facial pressure at times.  Also with a malodor more frequently.  Patient has been using his saline rinses but denies any nasal spray usage.     Start empiric Bactrim.  Patient with multiple antibiotic allergies with only mild itching/rash as a side effect from Bactrim.  Discussed starting Zyrtec daily while on Bactrim and if any significant reactions I will have him stop.  I swabbed his sinus exudate for culture and will use the results to direct antibiotic therapy as indicated.  START budesonide rinses.  He has not been on any consistent topical nasal steroids following surgery.  Small polyp seen on nasal endoscopy.    The patient's medications, allergies, past medical, surgical, social and family histories were reviewed and updated as appropriate.    A detailed review of systems was obtained with pertinent positives as per the above HPI, and otherwise negative.        Objective:     /80 (BP Location:  Right arm, Patient Position: Sitting)   Pulse 63   Wt 89.8 kg (197 lb 15.6 oz)   BMI 28.41 kg/m²        Constitutional:   He appears well-developed and well-nourished. He is active. Normal speech.      Head:  Normocephalic and atraumatic.     Nose:  No mucosal edema or rhinorrhea. Turbinates normal, no turbinate masses and no turbinate hypertrophy.  Right sinus exhibits no maxillary sinus tenderness and no frontal sinus tenderness. Left sinus exhibits no maxillary sinus tenderness and no frontal sinus tenderness.     Mouth/Throat  Oropharynx clear and moist without lesions or asymmetry and normal uvula midline. No uvula swelling. Tonsils not present.      Pulmonary/Chest:   Effort normal.     Psychiatric:   He has a normal mood and affect. His speech is normal and behavior is normal.         Procedure    Nasal endoscopy performed.  See procedure note.     Left     R crusting     Focal polypoid edema noted on superior aspect of photo     R ethmoid/sphenoid space      Data Reviewed    WBC (K/uL)   Date Value   03/11/2025 3.30 (L)     Eosinophil % (%)   Date Value   03/11/2025 7.6     Eos # (K/uL)   Date Value   03/11/2025 0.3     Platelets (K/uL)   Date Value   03/11/2025 157     Glucose (mg/dL)   Date Value   03/08/2025 83     Total IgE (IU/mL)   Date Value   09/21/2023 2043 (H)       Assessment:     1. Other chronic sinusitis    2. Nasal polyposis    3. Perennial allergic rhinitis with seasonal variation         Plan:     Can reduce budesonide to once daily  Cultures sent; will start topical abx based on results  F/u in 3 months with nasal endoscopy; may need intermittent abx rinses moving forward    He will follow up In 2-3 months  I had a discussion with the patient regarding his condition and the further workup and management options.    All questions were answered, and the patient is in agreement with the above.     Disclaimer:  This note may have been prepared utilizing voice recognition software which may  result in occasional typographical errors in the text such as sound alike words.   If further clarification is needed, please contact the ENT department of [facility].

## (undated) DEVICE — CLIP SPRING 6MM

## (undated) DEVICE — BLADE 4 INCH EDGE UN-INS

## (undated) DEVICE — RETRACTOR OCTOBASE INSERT HOLD

## (undated) DEVICE — BLADE 4IN EDGE INSULATED

## (undated) DEVICE — POWDER ARISTA AH 3G

## (undated) DEVICE — DRESSING ADH ISLAND 3.6 X 14

## (undated) DEVICE — SKIN MARKER DEVON 160

## (undated) DEVICE — SUT VICRYL BR 1 GEN 27 CT-1

## (undated) DEVICE — DRAPE SLUSH WARMER WITH DISC

## (undated) DEVICE — SUT SILK BLK BR. 2 2-60

## (undated) DEVICE — GLOVE BIOGEL SKINSENSE PI 7.0

## (undated) DEVICE — SUT MONOCRYL 4-0 PS-2

## (undated) DEVICE — SUT 6 18IN STEEL MONO CCS

## (undated) DEVICE — SUT VICRYL 3-0 27 SH

## (undated) DEVICE — ELECTRODE REM PLYHSV RETURN 9

## (undated) DEVICE — CONTAINER SPECIMEN STRL 4OZ

## (undated) DEVICE — SEE MEDLINE ITEM 146417

## (undated) DEVICE — TUBING HF INSUFFLATION W/ FLTR

## (undated) DEVICE — PROBE CATH TEMP 16 FRFOLEY 400

## (undated) DEVICE — CATH THORACIC ST AXIOM 32F

## (undated) DEVICE — GOWN NONREINF SET-IN SLV XL

## (undated) DEVICE — TOWEL OR DISP STRL BLUE 4/PK

## (undated) DEVICE — Device

## (undated) DEVICE — SUT SILK 2-0 SH 18IN BLACK

## (undated) DEVICE — SUT 4/0 18IN CHROMIC GUT PS

## (undated) DEVICE — SUT PLAIN 4-0 SC-1 18IN

## (undated) DEVICE — SYR 10CC LUER LOCK

## (undated) DEVICE — TOWEL OR DISP STRL BLUE 2/PK

## (undated) DEVICE — WIRE INTRAMYOCARDIAL TEMP

## (undated) DEVICE — SYR 50CC LL

## (undated) DEVICE — BLOWER MISTER

## (undated) DEVICE — SUT 4-0 CHROMIC GUT / RB1

## (undated) DEVICE — CANNULA VESSEL FREE FLOW

## (undated) DEVICE — DRAIN CHEST DRY SUCTION

## (undated) DEVICE — DRAPE SPLIT STERILE

## (undated) DEVICE — BLADE SCALP OPHTL RND TIP

## (undated) DEVICE — KIT SINUS OMC

## (undated) DEVICE — COVER MAYO STND XL 30X57IN

## (undated) DEVICE — TAPE MEDIPORE 4IN X 2YDS

## (undated) DEVICE — DRAPE INSTR MAGNETIC 10X16IN

## (undated) DEVICE — SEE MEDLINE ITEM 152515

## (undated) DEVICE — SOL IRR SOD CHL .9% POUR

## (undated) DEVICE — NDL 18GA X1 1/2 REG BEVEL

## (undated) DEVICE — BLADE BEAVER 15 DEG 1.5MM

## (undated) DEVICE — POSITIONER HEAD DONUT 9IN FOAM

## (undated) DEVICE — PAD CURAD NONADH 3X4IN

## (undated) DEVICE — PUNCH AORTIC 4.0MM 6/CASE

## (undated) DEVICE — SUT PROLENE 4-0 SH BLU 36IN

## (undated) DEVICE — CANNULA IMA 1MM

## (undated) DEVICE — CATH THORACIC ANGLE 32F

## (undated) DEVICE — TIP YANKAUERS BULB NO VENT

## (undated) DEVICE — SUT 2/0 30IN ETHIBOND

## (undated) DEVICE — INSERTS STEALTH FIBRA SIZE 5

## (undated) DEVICE — SET DECANTER MEDICHOICE

## (undated) DEVICE — SYR 3CC LUER LOC

## (undated) DEVICE — GAUZE SPONGE 4X4 12PLY

## (undated) DEVICE — DRESSING TELFA STRL 4X3 LF

## (undated) DEVICE — GLOVE BIOGEL 7.5

## (undated) DEVICE — CLOSURE SKIN STERI STRIP 1/2X4

## (undated) DEVICE — SUT PROLENE 5-0 BL C-1 4-24

## (undated) DEVICE — HEMOSTAT SURGICEL NU-KNIT 6X9

## (undated) DEVICE — TRACKER PATIENT NON INVASIVE

## (undated) DEVICE — SPONGE PATTY SURGICAL .5X3IN

## (undated) DEVICE — SYS VIRTUOSAPH PLUS EVM

## (undated) DEVICE — CLIPS VASCU-STAT YELLOW

## (undated) DEVICE — DRESSING AQUACEL SACRAL 9 X 9

## (undated) DEVICE — TRAY HEART

## (undated) DEVICE — PLEDGET SUT SOFT 3/8X3/16X1/16

## (undated) DEVICE — BANDAGE ACE ELASTIC 6"

## (undated) DEVICE — SOL NACL .9P 500ML

## (undated) DEVICE — PAD K-THERMIA 24IN X 60IN

## (undated) DEVICE — BLADE INFERIOR TURBINATE 2MM

## (undated) DEVICE — SEE MEDLINE ITEM 157128

## (undated) DEVICE — BLADE SAW STERNAL 5/BX

## (undated) DEVICE — SEE MEDLINE ITEM 157117

## (undated) DEVICE — SUT PROLENE 7-0 BV-1 30

## (undated) DEVICE — TRAY FOLEY 16FR INFECTION CONT

## (undated) DEVICE — POSITIONER IV ARMBOARD FOAM

## (undated) DEVICE — APPLICATOR ARISTA FLEX XL

## (undated) DEVICE — SUT PLN GUT 4-0 SC-1SC-1 1

## (undated) DEVICE — KIT SAHARA DRAPE DRAW/LIFT

## (undated) DEVICE — CONTAINER SPEC OR STRL 4.5OZ

## (undated) DEVICE — DRESSING TRANS 4X4 TEGADERM

## (undated) DEVICE — SUT 4-0 12-18IN SILK BLACK

## (undated) DEVICE — TRACKER ENT INSTRUMENT

## (undated) DEVICE — DRAPE CORETEMP FLD WRM 56X62IN

## (undated) DEVICE — SUT PROLENE 4-0 RB-1 BL MO